# Patient Record
Sex: MALE | Race: WHITE | Employment: FULL TIME | URBAN - METROPOLITAN AREA
[De-identification: names, ages, dates, MRNs, and addresses within clinical notes are randomized per-mention and may not be internally consistent; named-entity substitution may affect disease eponyms.]

---

## 2017-01-23 ENCOUNTER — OFFICE VISIT (OUTPATIENT)
Dept: INTERNAL MEDICINE CLINIC | Facility: CLINIC | Age: 67
End: 2017-01-23

## 2017-01-23 VITALS
HEIGHT: 70 IN | RESPIRATION RATE: 20 BRPM | HEART RATE: 98 BPM | SYSTOLIC BLOOD PRESSURE: 150 MMHG | WEIGHT: 181.19 LBS | DIASTOLIC BLOOD PRESSURE: 90 MMHG | TEMPERATURE: 98 F | BODY MASS INDEX: 25.94 KG/M2

## 2017-01-23 DIAGNOSIS — J34.89 NASAL DISCHARGE: ICD-10-CM

## 2017-01-23 DIAGNOSIS — R06.00 DOE (DYSPNEA ON EXERTION): ICD-10-CM

## 2017-01-23 DIAGNOSIS — R03.0 ELEVATED BLOOD PRESSURE READING: ICD-10-CM

## 2017-01-23 DIAGNOSIS — Z12.5 SCREENING FOR PROSTATE CANCER: ICD-10-CM

## 2017-01-23 DIAGNOSIS — E78.5 HYPERLIPIDEMIA, UNSPECIFIED HYPERLIPIDEMIA TYPE: ICD-10-CM

## 2017-01-23 DIAGNOSIS — L40.9 PSORIASIS AND SIMILAR DISORDER: ICD-10-CM

## 2017-01-23 DIAGNOSIS — Z00.00 ENCOUNTER FOR ANNUAL HEALTH EXAMINATION: Primary | ICD-10-CM

## 2017-01-23 PROCEDURE — G0438 PPPS, INITIAL VISIT: HCPCS | Performed by: INTERNAL MEDICINE

## 2017-01-23 RX ORDER — LATANOPROST 50 UG/ML
SOLUTION/ DROPS OPHTHALMIC
Refills: 1 | COMMUNITY
Start: 2016-11-22

## 2017-01-23 NOTE — PROGRESS NOTES
HPI:   Matt Lucas is a 78year old male who presents for a Medicare Initial Annual Wellness visit (Once after 12 month Medicare anniversery) . Patient here for annual wellness visit and follow-up. Last seen here in September 2014.   Blood work at [facility] IN[facility]ILL 1 DROP INTO BOTH EYES AT BEDTIME AS DIRECTED      MEDICAL INFORMATION:   He  has a past medical history of Psoriasis; Hyperlipidemia (2005); and Plantar fasciitis (2012).     He  has past surgical history that includes colonoscopy (1999) an trouble hearing conversations in a noisy background such as a crowded room   or restaurant:  No   I get confused about where sounds come from:  No I misunderstand some   words in a sentence and need to ask people to repeat themselves:  No   I especially four quadrants,  no masses, no organomegaly   Genitalia: Normal male   Rectal: Normal tone, normal prostate, no masses or tenderness   Extremities: Extremities normal, atraumatic, no cyanosis or edema   Pulses: 2+ and symmetric   Skin: Psoriatic plaques reading  Plan: No history of hypertension. Work on diet, exercise, weight loss. Follow-up here in the office in 3 months. Mr. Miladis Martin does not currently take aspirin. We discussed the risks and benefits of aspirin therapy.    Jamal Mcarthur is unabl bathing?: No    Problems with daily activities? : No    Memory Problems?: No      Fall/Risk Assessment     Do you have 3 or more medical conditions?: 0-No    Have you fallen in the last 12 months?: 0-No    Do you accidently lose urine?: 0-No    Do you have Screening      Colonoscopy Screen every 10 years Colonoscopy,10 Years due on 08/20/2022 Update Health Maintenance if applicable    Flex Sigmoidoscopy Screen every 10 years No results found for this or any previous visit. No flowsheet data found.      Fecal

## 2017-01-23 NOTE — PATIENT INSTRUCTIONS
Edis Olsen's SCREENING SCHEDULE   Tests on this list are recommended by your physician but may not be covered, or covered at this frequency, by your insurer. Please check with your insurance carrier before scheduling to verify coverage.     ILIA more often if abnormal Colonoscopy,10 Years due on 08/20/2022 Update Health Maintenance if applicable    Flex Sigmoidoscopy Screen  Covered every 5 years No results found for this or any previous visit. No flowsheet data found.      Fecal Occult Blood   Cov unless Medically needed    Zoster (Not covered by Medicare Part B) No orders found for this or any previous visit. This may be covered with your pharmacy  prescription benefits     Recommended Websites for Advanced Directives    [email or web address]

## 2017-01-24 ENCOUNTER — HOSPITAL ENCOUNTER (OUTPATIENT)
Dept: GENERAL RADIOLOGY | Age: 67
Discharge: HOME | End: 2017-01-24
Attending: INTERNAL MEDICINE
Payer: MEDICARE

## 2017-01-24 ENCOUNTER — LAB ENCOUNTER (OUTPATIENT)
Dept: LAB | Age: 67
End: 2017-01-24
Attending: INTERNAL MEDICINE
Payer: MEDICARE

## 2017-01-24 DIAGNOSIS — Z12.5 SCREENING FOR PROSTATE CANCER: ICD-10-CM

## 2017-01-24 DIAGNOSIS — R06.00 DOE (DYSPNEA ON EXERTION): Primary | ICD-10-CM

## 2017-01-24 DIAGNOSIS — R06.00 DOE (DYSPNEA ON EXERTION): ICD-10-CM

## 2017-01-24 DIAGNOSIS — E78.5 HYPERLIPIDEMIA, UNSPECIFIED HYPERLIPIDEMIA TYPE: ICD-10-CM

## 2017-01-24 LAB
ALBUMIN SERPL BCP-MCNC: 4.2 G/DL (ref 3.5–4.8)
ALBUMIN/GLOB SERPL: 1.2 {RATIO} (ref 1–2)
ALP SERPL-CCNC: 55 U/L (ref 32–100)
ALT SERPL-CCNC: 30 U/L (ref 17–63)
ANION GAP SERPL CALC-SCNC: 7 MMOL/L (ref 0–18)
AST SERPL-CCNC: 31 U/L (ref 15–41)
BASOPHILS # BLD: 0 K/UL (ref 0–0.2)
BASOPHILS NFR BLD: 0 %
BILIRUB SERPL-MCNC: 1.1 MG/DL (ref 0.3–1.2)
BUN SERPL-MCNC: 15 MG/DL (ref 8–20)
BUN/CREAT SERPL: 15.3 (ref 10–20)
CALCIUM SERPL-MCNC: 9.4 MG/DL (ref 8.5–10.5)
CHLORIDE SERPL-SCNC: 106 MMOL/L (ref 95–110)
CHOLEST SERPL-MCNC: 250 MG/DL (ref 110–200)
CO2 SERPL-SCNC: 27 MMOL/L (ref 22–32)
CREAT SERPL-MCNC: 0.98 MG/DL (ref 0.5–1.5)
EOSINOPHIL # BLD: 0.1 K/UL (ref 0–0.7)
EOSINOPHIL NFR BLD: 3 %
ERYTHROCYTE [DISTWIDTH] IN BLOOD BY AUTOMATED COUNT: 12.6 % (ref 11–15)
GLOBULIN PLAS-MCNC: 3.5 G/DL (ref 2.5–3.7)
GLUCOSE SERPL-MCNC: 86 MG/DL (ref 70–99)
HCT VFR BLD AUTO: 42.6 % (ref 41–52)
HDLC SERPL-MCNC: 62 MG/DL
HGB BLD-MCNC: 14.6 G/DL (ref 13.5–17.5)
LDLC SERPL CALC-MCNC: 163 MG/DL (ref 0–99)
LYMPHOCYTES # BLD: 1.8 K/UL (ref 1–4)
LYMPHOCYTES NFR BLD: 34 %
MCH RBC QN AUTO: 31.2 PG (ref 27–32)
MCHC RBC AUTO-ENTMCNC: 34.2 G/DL (ref 32–37)
MCV RBC AUTO: 91.3 FL (ref 80–100)
MONOCYTES # BLD: 0.4 K/UL (ref 0–1)
MONOCYTES NFR BLD: 9 %
NEUTROPHILS # BLD AUTO: 2.9 K/UL (ref 1.8–7.7)
NEUTROPHILS NFR BLD: 55 %
NONHDLC SERPL-MCNC: 188 MG/DL
OSMOLALITY UR CALC.SUM OF ELEC: 290 MOSM/KG (ref 275–295)
PLATELET # BLD AUTO: 221 K/UL (ref 140–400)
PMV BLD AUTO: 7.8 FL (ref 7.4–10.3)
POTASSIUM SERPL-SCNC: 4.3 MMOL/L (ref 3.3–5.1)
PROT SERPL-MCNC: 7.7 G/DL (ref 5.9–8.4)
PSA SERPL-MCNC: 0.8 NG/ML (ref 0–4)
RBC # BLD AUTO: 4.67 M/UL (ref 4.5–5.9)
SODIUM SERPL-SCNC: 140 MMOL/L (ref 136–144)
TRIGL SERPL-MCNC: 127 MG/DL (ref 1–149)
TSH SERPL-ACNC: 2.46 UIU/ML (ref 0.34–5.6)
VIT B12 SERPL-MCNC: 289 PG/ML (ref 181–914)
WBC # BLD AUTO: 5.2 K/UL (ref 4–11)

## 2017-01-24 PROCEDURE — 93010 ELECTROCARDIOGRAM REPORT: CPT | Performed by: INTERNAL MEDICINE

## 2017-01-24 PROCEDURE — 71020 XR CHEST PA + LAT CHEST (CPT=71020): CPT

## 2017-01-24 PROCEDURE — 93005 ELECTROCARDIOGRAM TRACING: CPT

## 2017-01-24 PROCEDURE — 36415 COLL VENOUS BLD VENIPUNCTURE: CPT

## 2017-01-24 PROCEDURE — 80061 LIPID PANEL: CPT

## 2017-01-24 PROCEDURE — 82607 VITAMIN B-12: CPT

## 2017-01-24 PROCEDURE — 84443 ASSAY THYROID STIM HORMONE: CPT

## 2017-01-24 PROCEDURE — 80053 COMPREHEN METABOLIC PANEL: CPT

## 2017-01-24 PROCEDURE — 85025 COMPLETE CBC W/AUTO DIFF WBC: CPT

## 2017-01-24 NOTE — PROGRESS NOTES
Quick Note:    Please refer to lab letter done today in regards to the response to these labs  ______

## 2017-09-14 ENCOUNTER — OFFICE VISIT (OUTPATIENT)
Dept: INTERNAL MEDICINE CLINIC | Facility: CLINIC | Age: 67
End: 2017-09-14

## 2017-09-14 VITALS
TEMPERATURE: 98 F | BODY MASS INDEX: 25.08 KG/M2 | RESPIRATION RATE: 20 BRPM | SYSTOLIC BLOOD PRESSURE: 110 MMHG | HEART RATE: 86 BPM | DIASTOLIC BLOOD PRESSURE: 72 MMHG | HEIGHT: 70 IN | WEIGHT: 175.19 LBS

## 2017-09-14 DIAGNOSIS — E78.5 HYPERLIPIDEMIA, UNSPECIFIED HYPERLIPIDEMIA TYPE: Primary | ICD-10-CM

## 2017-09-14 PROCEDURE — G0463 HOSPITAL OUTPT CLINIC VISIT: HCPCS | Performed by: INTERNAL MEDICINE

## 2017-09-14 PROCEDURE — 99213 OFFICE O/P EST LOW 20 MIN: CPT | Performed by: INTERNAL MEDICINE

## 2017-09-14 NOTE — PROGRESS NOTES
HPI:    Patient ID: Channing Okeefe is a 75year old male.     HPI  Patient returns to the office today to discuss chronic medical issues which include Patient Active Problem List:     Plantar fasciitis     Psoriasis and similar disorder     Hyperlipidemia of Systems   Constitutional: Negative for chills, fatigue and fever. HENT: Negative for hearing loss. Eyes: Negative for visual disturbance. Respiratory: Positive for cough. Negative for shortness of breath.     Cardiovascular: Negative for chest stacey next 10 years for cardiovascular event. All this was discussed with the patient. Discussed current indications for initiation of statin therapy. Patient is not eager to start any medication. We will recheck now and contact patient with results.   Contin

## 2017-09-28 ENCOUNTER — APPOINTMENT (OUTPATIENT)
Dept: LAB | Age: 67
End: 2017-09-28
Attending: INTERNAL MEDICINE
Payer: MEDICARE

## 2017-09-28 DIAGNOSIS — E78.5 HYPERLIPIDEMIA, UNSPECIFIED HYPERLIPIDEMIA TYPE: ICD-10-CM

## 2017-09-28 PROCEDURE — 80061 LIPID PANEL: CPT

## 2017-09-28 PROCEDURE — 36415 COLL VENOUS BLD VENIPUNCTURE: CPT

## 2017-10-03 ENCOUNTER — OFFICE VISIT (OUTPATIENT)
Dept: PODIATRY CLINIC | Facility: CLINIC | Age: 67
End: 2017-10-03

## 2017-10-03 DIAGNOSIS — M77.50 TENDONITIS OF FOOT: Primary | ICD-10-CM

## 2017-10-03 PROCEDURE — 99203 OFFICE O/P NEW LOW 30 MIN: CPT | Performed by: PODIATRIST

## 2017-10-03 PROCEDURE — G0463 HOSPITAL OUTPT CLINIC VISIT: HCPCS | Performed by: PODIATRIST

## 2017-10-03 NOTE — PROGRESS NOTES
HPI:    Patient ID: Holland Suarez is a 75year old male. HPI  This 68-year-old male presents as a new patient to me and states that he is self-referred. The reason for pain is pain associated with the right foot over the last couple of months.   He ha of foot  (primary encounter diagnosis)    No orders of the defined types were placed in this encounter.       Meds This Visit:  No prescriptions requested or ordered in this encounter    Imaging & Referrals:  None       TR#[id number]

## 2018-02-12 ENCOUNTER — OFFICE VISIT (OUTPATIENT)
Dept: DERMATOLOGY CLINIC | Facility: CLINIC | Age: 68
End: 2018-02-12

## 2018-02-12 DIAGNOSIS — D23.9 BENIGN NEOPLASM OF SKIN, UNSPECIFIED LOCATION: ICD-10-CM

## 2018-02-12 DIAGNOSIS — L82.1 VERRUCOUS KERATOSIS: ICD-10-CM

## 2018-02-12 DIAGNOSIS — L82.1 SEBORRHEIC KERATOSES: ICD-10-CM

## 2018-02-12 DIAGNOSIS — L40.9 PSORIASIS: ICD-10-CM

## 2018-02-12 DIAGNOSIS — L57.0 AK (ACTINIC KERATOSIS): Primary | ICD-10-CM

## 2018-02-12 PROCEDURE — 17000 DESTRUCT PREMALG LESION: CPT | Performed by: DERMATOLOGY

## 2018-02-12 PROCEDURE — 99202 OFFICE O/P NEW SF 15 MIN: CPT | Performed by: DERMATOLOGY

## 2018-02-12 PROCEDURE — 17110 DESTRUCTION B9 LES UP TO 14: CPT | Performed by: DERMATOLOGY

## 2018-02-12 RX ORDER — LATANOPROST 50 UG/ML
SOLUTION/ DROPS OPHTHALMIC
COMMUNITY
Start: 2017-11-07 | End: 2018-02-12

## 2018-02-25 NOTE — PROGRESS NOTES
Rony Manuel is a 75year old male. HPI:     CC:  Patient presents with:  Full Skin Exam: LOV 5/20/2013 with Dr. Mik Coello. Pt presenting for full body skin exam. Pt has a personal hx of BCC.   Lesion: Pt presenting with lesions to R eyebrow and L middle No    Reaction to local anesthetic No     Social History Narrative   None on file     Family History   Problem Relation Age of Onset   • Kidney Disease Father      kidney failure (cause of death)   • Other [OTHER] Mother      natural causes (cause of death plan:    No orders of the defined types were placed in this encounter.       Meds & Refills for this Visit:  No prescriptions requested or ordered in this encounter         Ak (actinic keratosis)  (primary encounter diagnosis)  Psoriasis  Seborrheic keratos

## 2020-07-01 ENCOUNTER — TELEPHONE (OUTPATIENT)
Dept: DERMATOLOGY CLINIC | Facility: CLINIC | Age: 70
End: 2020-07-01

## 2020-07-01 ENCOUNTER — OFFICE VISIT (OUTPATIENT)
Dept: DERMATOLOGY CLINIC | Facility: CLINIC | Age: 70
End: 2020-07-01
Payer: MEDICARE

## 2020-07-01 DIAGNOSIS — D23.4 BENIGN NEOPLASM OF SCALP AND SKIN OF NECK: ICD-10-CM

## 2020-07-01 DIAGNOSIS — L40.0 PSORIASIS VULGARIS: ICD-10-CM

## 2020-07-01 DIAGNOSIS — D23.60 BENIGN NEOPLASM OF SKIN OF UPPER LIMB, INCLUDING SHOULDER, UNSPECIFIED LATERALITY: ICD-10-CM

## 2020-07-01 DIAGNOSIS — D23.30 BENIGN NEOPLASM OF SKIN OF FACE: ICD-10-CM

## 2020-07-01 DIAGNOSIS — D23.70 BENIGN NEOPLASM OF SKIN OF LOWER LIMB, INCLUDING HIP, UNSPECIFIED LATERALITY: ICD-10-CM

## 2020-07-01 DIAGNOSIS — D23.5 BENIGN NEOPLASM OF SKIN OF TRUNK, EXCEPT SCROTUM: ICD-10-CM

## 2020-07-01 DIAGNOSIS — Z85.828 HISTORY OF NONMELANOMA SKIN CANCER: ICD-10-CM

## 2020-07-01 DIAGNOSIS — L57.0 AK (ACTINIC KERATOSIS): Primary | ICD-10-CM

## 2020-07-01 PROCEDURE — 99214 OFFICE O/P EST MOD 30 MIN: CPT | Performed by: DERMATOLOGY

## 2020-07-01 PROCEDURE — G0463 HOSPITAL OUTPT CLINIC VISIT: HCPCS | Performed by: DERMATOLOGY

## 2020-07-01 RX ORDER — CLOBETASOL PROPIONATE 0.5 MG/G
OINTMENT TOPICAL
Qty: 60 G | Refills: 1 | Status: SHIPPED | OUTPATIENT
Start: 2020-07-01 | End: 2021-04-09

## 2020-07-01 RX ORDER — BETAMETHASONE DIPROPIONATE 0.5 MG/G
1 CREAM TOPICAL 2 TIMES DAILY
Qty: 50 G | Refills: 3 | Status: SHIPPED | OUTPATIENT
Start: 2020-07-01 | End: 2021-08-17

## 2020-07-01 NOTE — TELEPHONE ENCOUNTER
Pt seen today by Anne Marie Raza, received electronic PA for his duobrii, PA completed, awaiting decision

## 2020-07-02 NOTE — TELEPHONE ENCOUNTER
Fax from [place] [phone] denied for Halobetasol Prop-Tazarotene (DUOBRII) 0.01-0.045 % External Lotion    Placed in pa inbox

## 2020-07-02 NOTE — TELEPHONE ENCOUNTER
PA denial reason: We denied this request under Medicare Part D because the drug you asked for is non-formulary. Denial letter placed in KMT's inbox for review.

## 2020-07-03 NOTE — TELEPHONE ENCOUNTER
Emilee orellana. If pt wants, can try at [facility] [place], but he is Medicare, so additional rebates may not be possible.  He has clobetasol oint for now, ok to try that alone 1st.      Could see if tazorac is an option to add for the thicker spots along with the clobet

## 2020-07-06 NOTE — PROGRESS NOTES
Jayant Paul is a 69year old male. HPI:     CC:  Patient presents with:  Derm Problem: LOV 2/12/18. Patient presents for full body check. Moles on back patient would like assessed. Unknown if changing.  Personal hx of BCC  Psoriasis: Psoriasis patches thumb 4 sutures     Social History    Socioeconomic History      Marital status:       Spouse name: Not on file      Number of children: Not on file      Years of education: Not on file      Highest education level: Not on file    Occupational Histo tanning: Not Asked        Outdoor occupation: Not Asked        Pt has a pacemaker: No        Pt has a defibrillator: No        Reaction to local anesthetic: No    Social History Narrative      Not on file    Family History   Problem Relation Age of Onset lips external ears, back, chest,/ breasts, axillae,  abdomen, arms, legs, palms.      Multiple light to medium brown, well marginated, uniformly pigmented, macules and papules 6 mm and less scattered on exam. pigmented lesions examined with dermoscopy benig previously treated with cryo stable. Multiple benign keratoses of the shoulders back. Reassurance.       Psoriasis diffuse over the scalp with prominent erythema scaling keratotic papules, prominent plaques elbows knees some with more pustular areas and follow-up/ above. This note was generated using Dragon voice recognition software. Please contact me regarding any confusion resulting from errors in recognition.

## 2020-07-06 NOTE — TELEPHONE ENCOUNTER
Pt informed and states he will try the clobetasol ointment for now and will let us know if he needs us to pursue an additional topical  in the near future.

## 2020-08-26 ENCOUNTER — OFFICE VISIT (OUTPATIENT)
Dept: INTERNAL MEDICINE CLINIC | Facility: CLINIC | Age: 70
End: 2020-08-26
Payer: MEDICARE

## 2020-08-26 VITALS
HEIGHT: 70 IN | WEIGHT: 167.81 LBS | HEART RATE: 66 BPM | DIASTOLIC BLOOD PRESSURE: 76 MMHG | BODY MASS INDEX: 24.02 KG/M2 | SYSTOLIC BLOOD PRESSURE: 131 MMHG

## 2020-08-26 DIAGNOSIS — Z12.5 SCREENING FOR PROSTATE CANCER: ICD-10-CM

## 2020-08-26 DIAGNOSIS — Z00.00 ENCOUNTER FOR ANNUAL HEALTH EXAMINATION: Primary | ICD-10-CM

## 2020-08-26 DIAGNOSIS — E78.5 HYPERLIPIDEMIA, UNSPECIFIED HYPERLIPIDEMIA TYPE: ICD-10-CM

## 2020-08-26 PROCEDURE — G0439 PPPS, SUBSEQ VISIT: HCPCS | Performed by: INTERNAL MEDICINE

## 2020-08-26 NOTE — PATIENT INSTRUCTIONS
Edis Olsen's SCREENING SCHEDULE   Tests on this list are recommended by your physician but may not be covered, or covered at this frequency, by your insurer. Please check with your insurance carrier before scheduling to verify coverage.     ILIA Colorectal Cancer Screening Covered up to Age 76     Colonoscopy Screen   Covered every 10 years- more often if abnormal Colonoscopy due on 08/20/2022 Update Health Maintenance if applicable    Flex Sigmoidoscopy Screen  Covered every 5 years No results visit. This may be covered with your prescription benefits, but Medicare does not cover unless Medically needed    Zoster (Not covered by Medicare Part B) No orders found for this or any previous visit.  This may be covered with your pharmacy  prescription

## 2020-08-26 NOTE — PROGRESS NOTES
HPI:   Isela Carlson is a 69year old male who presents for a Medicare Subsequent Annual Wellness visit (Pt already had Initial Annual Wellness).     Patient is here for Medicare annual wellness visit and follow-up on chronic medical issues as listed be office a copy of it for scanning into Epic. He smoked tobacco in the past but quit greater than 12 months ago.   Social History    Tobacco Use      Smoking status: Former Smoker      Smokeless tobacco: Never Used         CAGE Alcohol screening   T history (2013). His family history includes Kidney Disease in his father; Other in his mother. SOCIAL HISTORY:   He  reports that he has quit smoking. He has never used smokeless tobacco. He reports current alcohol use.  He reports that he does not use a crowded room or restaurant:  No   I get confused about where sounds come from:  No I misunderstand some words in a sentence and need to ask people to repeat themselves:  No   I especially have trouble understanding the speech of women and children:  No I no cervical adenopathy. Right: No inguinal adenopathy present. Left: No inguinal adenopathy present. Neurological: He is alert. No cranial nerve deficit. Coordination normal.   Skin: Skin is warm and dry. No rash noted.    Psychiatric: He ha Biju Sims MD, 8/26/2020     General Health     In the past six months, have you lost more than 10 pounds without trying?: 2 - No  Has your appetite been poor?: No  How does the patient maintain a good energy level?: Other  How would you describe your daily (Pneumovax)  Covered Once after 65 No vaccine history found Please get once after your 65th birthday    Hepatitis B for Moderate/High Risk No vaccine history found Medium/high risk factors:   End-stage renal disease   Hemophiliacs who received Factor VIII

## 2020-08-28 ENCOUNTER — LAB ENCOUNTER (OUTPATIENT)
Dept: LAB | Age: 70
End: 2020-08-28
Attending: INTERNAL MEDICINE
Payer: MEDICARE

## 2020-08-28 DIAGNOSIS — Z12.5 SCREENING FOR PROSTATE CANCER: ICD-10-CM

## 2020-08-28 DIAGNOSIS — E78.5 HYPERLIPIDEMIA, UNSPECIFIED HYPERLIPIDEMIA TYPE: ICD-10-CM

## 2020-08-28 LAB
ALBUMIN SERPL-MCNC: 3.8 G/DL (ref 3.4–5)
ALBUMIN/GLOB SERPL: 1 {RATIO} (ref 1–2)
ALP LIVER SERPL-CCNC: 74 U/L (ref 45–117)
ALT SERPL-CCNC: 30 U/L (ref 16–61)
ANION GAP SERPL CALC-SCNC: 6 MMOL/L (ref 0–18)
AST SERPL-CCNC: 23 U/L (ref 15–37)
BASOPHILS # BLD AUTO: 0.02 X10(3) UL (ref 0–0.2)
BASOPHILS NFR BLD AUTO: 0.4 %
BILIRUB SERPL-MCNC: 0.7 MG/DL (ref 0.1–2)
BUN BLD-MCNC: 19 MG/DL (ref 7–18)
BUN/CREAT SERPL: 18.1 (ref 10–20)
CALCIUM BLD-MCNC: 9.5 MG/DL (ref 8.5–10.1)
CHLORIDE SERPL-SCNC: 108 MMOL/L (ref 98–112)
CHOLEST SMN-MCNC: 236 MG/DL (ref ?–200)
CO2 SERPL-SCNC: 26 MMOL/L (ref 21–32)
COMPLEXED PSA SERPL-MCNC: 1.12 NG/ML (ref ?–4)
CREAT BLD-MCNC: 1.05 MG/DL (ref 0.7–1.3)
DEPRECATED RDW RBC AUTO: 39.9 FL (ref 35.1–46.3)
EOSINOPHIL # BLD AUTO: 0.08 X10(3) UL (ref 0–0.7)
EOSINOPHIL NFR BLD AUTO: 1.6 %
ERYTHROCYTE [DISTWIDTH] IN BLOOD BY AUTOMATED COUNT: 11.9 % (ref 11–15)
GLOBULIN PLAS-MCNC: 4 G/DL (ref 2.8–4.4)
GLUCOSE BLD-MCNC: 82 MG/DL (ref 70–99)
HCT VFR BLD AUTO: 41.4 % (ref 39–53)
HDLC SERPL-MCNC: 60 MG/DL (ref 40–59)
HGB BLD-MCNC: 14.3 G/DL (ref 13–17.5)
IMM GRANULOCYTES # BLD AUTO: 0.02 X10(3) UL (ref 0–1)
IMM GRANULOCYTES NFR BLD: 0.4 %
LDLC SERPL CALC-MCNC: 147 MG/DL (ref ?–100)
LYMPHOCYTES # BLD AUTO: 1.51 X10(3) UL (ref 1–4)
LYMPHOCYTES NFR BLD AUTO: 30.3 %
M PROTEIN MFR SERPL ELPH: 7.8 G/DL (ref 6.4–8.2)
MCH RBC QN AUTO: 31.6 PG (ref 26–34)
MCHC RBC AUTO-ENTMCNC: 34.5 G/DL (ref 31–37)
MCV RBC AUTO: 91.6 FL (ref 80–100)
MONOCYTES # BLD AUTO: 0.48 X10(3) UL (ref 0.1–1)
MONOCYTES NFR BLD AUTO: 9.6 %
NEUTROPHILS # BLD AUTO: 2.87 X10 (3) UL (ref 1.5–7.7)
NEUTROPHILS # BLD AUTO: 2.87 X10(3) UL (ref 1.5–7.7)
NEUTROPHILS NFR BLD AUTO: 57.7 %
NONHDLC SERPL-MCNC: 176 MG/DL (ref ?–130)
OSMOLALITY SERPL CALC.SUM OF ELEC: 291 MOSM/KG (ref 275–295)
PATIENT FASTING Y/N/NP: YES
PATIENT FASTING Y/N/NP: YES
PLATELET # BLD AUTO: 210 10(3)UL (ref 150–450)
POTASSIUM SERPL-SCNC: 4.3 MMOL/L (ref 3.5–5.1)
RBC # BLD AUTO: 4.52 X10(6)UL (ref 3.8–5.8)
SODIUM SERPL-SCNC: 140 MMOL/L (ref 136–145)
TRIGL SERPL-MCNC: 144 MG/DL (ref 30–149)
TSI SER-ACNC: 2.46 MIU/ML (ref 0.36–3.74)
VLDLC SERPL CALC-MCNC: 29 MG/DL (ref 0–30)
WBC # BLD AUTO: 5 X10(3) UL (ref 4–11)

## 2020-08-28 PROCEDURE — 80053 COMPREHEN METABOLIC PANEL: CPT

## 2020-08-28 PROCEDURE — 36415 COLL VENOUS BLD VENIPUNCTURE: CPT

## 2020-08-28 PROCEDURE — 80061 LIPID PANEL: CPT

## 2020-08-28 PROCEDURE — 85025 COMPLETE CBC W/AUTO DIFF WBC: CPT

## 2020-08-28 PROCEDURE — 84443 ASSAY THYROID STIM HORMONE: CPT

## 2021-03-15 DIAGNOSIS — Z23 NEED FOR VACCINATION: ICD-10-CM

## 2021-04-09 RX ORDER — CLOBETASOL PROPIONATE 0.5 MG/G
OINTMENT TOPICAL
Qty: 60 G | Refills: 0 | Status: SHIPPED | OUTPATIENT
Start: 2021-04-09

## 2021-04-16 ENCOUNTER — OFFICE VISIT (OUTPATIENT)
Dept: INTERNAL MEDICINE CLINIC | Facility: CLINIC | Age: 71
End: 2021-04-16
Payer: MEDICARE

## 2021-04-16 ENCOUNTER — HOSPITAL ENCOUNTER (OUTPATIENT)
Dept: GENERAL RADIOLOGY | Facility: HOSPITAL | Age: 71
Discharge: HOME | End: 2021-04-16
Attending: INTERNAL MEDICINE
Payer: MEDICARE

## 2021-04-16 VITALS
WEIGHT: 180.19 LBS | RESPIRATION RATE: 20 BRPM | DIASTOLIC BLOOD PRESSURE: 80 MMHG | SYSTOLIC BLOOD PRESSURE: 146 MMHG | HEIGHT: 70 IN | BODY MASS INDEX: 25.8 KG/M2 | HEART RATE: 58 BPM

## 2021-04-16 DIAGNOSIS — M54.50 MIDLINE LOW BACK PAIN WITHOUT SCIATICA, UNSPECIFIED CHRONICITY: Primary | ICD-10-CM

## 2021-04-16 DIAGNOSIS — M54.50 MIDLINE LOW BACK PAIN WITHOUT SCIATICA, UNSPECIFIED CHRONICITY: ICD-10-CM

## 2021-04-16 PROCEDURE — 99213 OFFICE O/P EST LOW 20 MIN: CPT | Performed by: INTERNAL MEDICINE

## 2021-04-16 PROCEDURE — 72100 X-RAY EXAM L-S SPINE 2/3 VWS: CPT | Performed by: INTERNAL MEDICINE

## 2021-04-16 RX ORDER — ASPIRIN 325 MG
325 TABLET ORAL EVERY 6 HOURS PRN
COMMUNITY
End: 2021-09-27

## 2021-04-16 NOTE — PROGRESS NOTES
HPI:    Patient ID: Silvana Chavez is a 66year old male. HPI  Patient is here complaining of low back pain for the past couple of months. 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   It is seasonal so he does not wor Ointment APPLY TWICE DAILY TOPICALLY TO PSORIASIS ON BUTTOCKS, ELBOWS KNEES 60 g 0   • Halobetasol Prop-Tazarotene (DUOBRII) 0.01-0.045 % External Lotion Apply 1 Application topically daily.  100 g 3   • Betamethasone Dipropionate Aug 0.05 % External Cream where he is not doing that sort of strenuous activity. Continue with the stretching and core exercises at the chiropractor taught him. Refer to physiatry for further evaluation and treatment options. - XR LUMBAR SPINE (MIN 2 VIEWS) (CPT=72100);  Future

## 2021-06-23 ENCOUNTER — TELEPHONE (OUTPATIENT)
Dept: ORTHOPEDICS CLINIC | Facility: CLINIC | Age: 71
End: 2021-06-23

## 2021-06-23 DIAGNOSIS — M79.672 LEFT FOOT PAIN: Primary | ICD-10-CM

## 2021-06-23 NOTE — TELEPHONE ENCOUNTER
Patient coming in for left foot pain, states that pain worsens while walking and standing. Patient has not had imaging, if needed please place RX.   Future Appointments   Date Time Provider Rosanna Dumont   6/24/2021  3:00 PM Shadi Brown, [facility]

## 2021-06-23 NOTE — TELEPHONE ENCOUNTER
Called and lmom for Jane Obrien about his appointment tomorrow just letting him know that an order for left foot xray's have been placed.  I also suggested that he show up to the appointment about 20/30 mins prior to his appointment time to have xray's taken of

## 2021-06-24 ENCOUNTER — TELEPHONE (OUTPATIENT)
Dept: ORTHOPEDICS CLINIC | Facility: CLINIC | Age: 71
End: 2021-06-24

## 2021-06-24 ENCOUNTER — OFFICE VISIT (OUTPATIENT)
Dept: ORTHOPEDICS CLINIC | Facility: CLINIC | Age: 71
End: 2021-06-24
Payer: MEDICARE

## 2021-06-24 ENCOUNTER — HOSPITAL ENCOUNTER (OUTPATIENT)
Dept: GENERAL RADIOLOGY | Age: 71
Discharge: HOME | End: 2021-06-24
Attending: PODIATRIST
Payer: MEDICARE

## 2021-06-24 VITALS — BODY MASS INDEX: 24.5 KG/M2 | HEIGHT: 71 IN | WEIGHT: 175 LBS

## 2021-06-24 DIAGNOSIS — R26.9 GAIT ABNORMALITY: ICD-10-CM

## 2021-06-24 DIAGNOSIS — M79.672 LEFT FOOT PAIN: ICD-10-CM

## 2021-06-24 DIAGNOSIS — M72.2 PLANTAR FASCIITIS: Primary | ICD-10-CM

## 2021-06-24 PROCEDURE — 99202 OFFICE O/P NEW SF 15 MIN: CPT | Performed by: PODIATRIST

## 2021-06-24 PROCEDURE — 73630 X-RAY EXAM OF FOOT: CPT | Performed by: PODIATRIST

## 2021-06-24 NOTE — PROGRESS NOTES
[facility] New Patient Note    CC: Patient presents with: Foot Pain: left foot pain/xray today      HPI: The patient is a 66year old male who presents today with complaints of left outside of foot pain for some time and hx of PF.   No injury, mentioned above. Physical Exam:    Ht 5' 11\" (1.803 m)   Wt 175 lb (79.4 kg)   BMI 24.41 kg/m²   Neurovascular status is intact distally. Patient is a neutral arch upon stance a little dropping of the arch on the left.   Tenderness very minimal along t

## 2021-06-24 NOTE — TELEPHONE ENCOUNTER
No prior auth or precert needed for orthotics- 20% COINS pt should have a 171.00 balance at the time of casting     Please schedule casting appt

## 2021-06-25 NOTE — TELEPHONE ENCOUNTER
LMOM letting patient know balance of orthotics due at the time of casting and asked for him to return call to schedule appointment.

## 2021-07-01 ENCOUNTER — MED REC SCAN ONLY (OUTPATIENT)
Dept: ORTHOPEDICS CLINIC | Facility: CLINIC | Age: 71
End: 2021-07-01

## 2021-07-15 ENCOUNTER — OFFICE VISIT (OUTPATIENT)
Dept: ORTHOPEDICS CLINIC | Facility: CLINIC | Age: 71
End: 2021-07-15
Payer: MEDICARE

## 2021-07-15 VITALS — WEIGHT: 175 LBS | BODY MASS INDEX: 24.5 KG/M2 | HEIGHT: 71 IN

## 2021-07-15 DIAGNOSIS — M72.2 PLANTAR FASCIITIS: Primary | ICD-10-CM

## 2021-07-15 PROCEDURE — L3000 FT INSERT UCB BERKELEY SHELL: HCPCS | Performed by: PODIATRIST

## 2021-07-15 NOTE — PROGRESS NOTES
[facility] Progress Note    Subjective: Mr. Celeste Mckenzie is here for orthotic casting today.   He has had continued heel pain and arch pain,  pretty much the same issues as per last visit      Objective:   atilio feet  Narrow foot type, neutral arch  Minimal

## 2021-07-30 ENCOUNTER — TELEPHONE (OUTPATIENT)
Dept: ORTHOPEDICS CLINIC | Facility: CLINIC | Age: 71
End: 2021-07-30

## 2021-07-30 NOTE — TELEPHONE ENCOUNTER
I called and spoke with Cristian's wife telling her that his orthotics are in. He will call to set up an appointment.

## 2021-08-13 ENCOUNTER — OFFICE VISIT (OUTPATIENT)
Dept: ORTHOPEDICS CLINIC | Facility: CLINIC | Age: 71
End: 2021-08-13
Payer: MEDICARE

## 2021-08-13 VITALS — WEIGHT: 175 LBS | BODY MASS INDEX: 24.5 KG/M2 | HEIGHT: 71 IN

## 2021-08-13 DIAGNOSIS — M72.2 PLANTAR FASCIITIS: Primary | ICD-10-CM

## 2021-08-13 PROCEDURE — 97760 ORTHOTIC MGMT&TRAING 1ST ENC: CPT | Performed by: PODIATRIST

## 2021-08-13 NOTE — PROGRESS NOTES
[facility] Progress Note    Subjective:     Pt here for  of orthotics for plantar fasciits  About the same            Objective:     Bill feet - tender at PF insertion but improving  Minimal exam today                  Assessment/Plan:   [place]

## 2021-08-17 RX ORDER — BETAMETHASONE DIPROPIONATE 0.5 MG/G
CREAM TOPICAL
Qty: 50 G | Refills: 0 | Status: SHIPPED | OUTPATIENT
Start: 2021-08-17

## 2021-09-27 ENCOUNTER — OFFICE VISIT (OUTPATIENT)
Dept: INTERNAL MEDICINE CLINIC | Facility: CLINIC | Age: 71
End: 2021-09-27
Payer: MEDICARE

## 2021-09-27 VITALS
HEIGHT: 71 IN | DIASTOLIC BLOOD PRESSURE: 86 MMHG | TEMPERATURE: 98 F | RESPIRATION RATE: 18 BRPM | SYSTOLIC BLOOD PRESSURE: 130 MMHG | HEART RATE: 64 BPM | WEIGHT: 170 LBS | BODY MASS INDEX: 23.8 KG/M2

## 2021-09-27 DIAGNOSIS — E78.5 HYPERLIPIDEMIA, UNSPECIFIED HYPERLIPIDEMIA TYPE: ICD-10-CM

## 2021-09-27 DIAGNOSIS — Z12.5 SCREENING FOR PROSTATE CANCER: ICD-10-CM

## 2021-09-27 DIAGNOSIS — M54.50 MIDLINE LOW BACK PAIN WITHOUT SCIATICA, UNSPECIFIED CHRONICITY: ICD-10-CM

## 2021-09-27 DIAGNOSIS — Z00.00 ENCOUNTER FOR ANNUAL HEALTH EXAMINATION: Primary | ICD-10-CM

## 2021-09-27 DIAGNOSIS — G47.9 SLEEP DISTURBANCE: ICD-10-CM

## 2021-09-27 PROCEDURE — G0439 PPPS, SUBSEQ VISIT: HCPCS | Performed by: INTERNAL MEDICINE

## 2021-09-27 NOTE — PATIENT INSTRUCTIONS
Edis Olsen's SCREENING SCHEDULE   Tests on this list are recommended by your physician but may not be covered, or covered at this frequency, by your insurer. Please check with your insurance carrier before scheduling to verify coverage.    Althea Blake Pneumococcal Each vaccine (Zbpsoll25 & Srtuiyhts11) covered once after 65 Prevnar 13: -    Hxrxlvoop98: -     Pneumococcal Vaccination(1 of 1 - PPSV23) Never done    Hepatitis B One screening covered for patients with certain risk factors   -  No recommend

## 2021-09-27 NOTE — PROGRESS NOTES
HPI:   Shilo Meyers is a 66year old male who presents for a Medicare Subsequent Annual Wellness visit (Pt already had Initial Annual Wellness).      Patient is here requesting Medicare annual wellness visit and follow-up on chronic medical problems as screening of functional status. Vision Problems? : Yes   He has problems with Memory based on screening of functional status.    Memory Problems?: Yes       Depression Screening (PHQ-2/PHQ-9): Over the LAST 2 WEEKS   Little interest or pleasure in doing t (most recent labs)   Lab Results   Component Value Date    WBC 5.0 08/28/2020    HGB 14.3 08/28/2020    .0 08/28/2020        ALLERGIES:   He has No Known Allergies.     CURRENT MEDICATIONS:   Betamethasone Dipropionate Aug 0.05 % External Cream, KAVIN as a crowded room or restaurant: Sometimes   I get confused about where sounds come from: No I misunderstand some words in a sentence and need to ask people to repeat themselves: No   I especially have trouble understanding the speech of women and children left inguinal area. Genitourinary:     Penis: Normal and circumcised. Testes: Normal.   Musculoskeletal:      Right lower leg: No edema. Left lower leg: No edema. Lymphadenopathy:      Cervical: No cervical adenopathy.    Skin:     General: S Future  - LIPID PANEL; Future  - TSH W REFLEX TO FREE T4; Future  - VITAMIN B12; Future    3. Midline low back pain without sciatica, unspecified chronicity  Doing better. Seeing a chiropractor.     4. Sleep disturbance  Patient with recent development of screening every 6 months if diagnosed with pre-diabetes Lab Results   Component Value Date    GLU 82 08/28/2020        Cardiovascular Disease Screening    Lipid Panel  Cholesterol  Lipoprotein (HDL)  Triglycerides Covered every 5 years for all Medicare lala Medicare Part B -  No recommendations at this time    Zoster Not covered by Medicare Part B; may be covered with your pharmacy  prescription benefits -  Zoster Vaccines(1 of 2) Never done

## 2021-09-28 ENCOUNTER — LAB ENCOUNTER (OUTPATIENT)
Dept: LAB | Age: 71
End: 2021-09-28
Attending: INTERNAL MEDICINE
Payer: MEDICARE

## 2021-09-28 DIAGNOSIS — Z12.5 SCREENING FOR PROSTATE CANCER: ICD-10-CM

## 2021-09-28 DIAGNOSIS — E78.5 HYPERLIPIDEMIA, UNSPECIFIED HYPERLIPIDEMIA TYPE: ICD-10-CM

## 2021-09-28 PROCEDURE — 85025 COMPLETE CBC W/AUTO DIFF WBC: CPT

## 2021-09-28 PROCEDURE — 80061 LIPID PANEL: CPT

## 2021-09-28 PROCEDURE — 82607 VITAMIN B-12: CPT

## 2021-09-28 PROCEDURE — 84443 ASSAY THYROID STIM HORMONE: CPT

## 2021-09-28 PROCEDURE — 36415 COLL VENOUS BLD VENIPUNCTURE: CPT

## 2021-09-28 PROCEDURE — 80053 COMPREHEN METABOLIC PANEL: CPT

## 2021-11-24 ENCOUNTER — OFFICE VISIT (OUTPATIENT)
Dept: DERMATOLOGY CLINIC | Facility: CLINIC | Age: 71
End: 2021-11-24
Payer: MEDICARE

## 2021-11-24 DIAGNOSIS — Z85.828 HISTORY OF NONMELANOMA SKIN CANCER: ICD-10-CM

## 2021-11-24 DIAGNOSIS — L57.0 AK (ACTINIC KERATOSIS): Primary | ICD-10-CM

## 2021-11-24 DIAGNOSIS — D23.9 BENIGN NEOPLASM OF SKIN, UNSPECIFIED LOCATION: ICD-10-CM

## 2021-11-24 DIAGNOSIS — L40.0 PSORIASIS VULGARIS: ICD-10-CM

## 2021-11-24 DIAGNOSIS — L57.8 SUN-DAMAGED SKIN: ICD-10-CM

## 2021-11-24 PROCEDURE — 99213 OFFICE O/P EST LOW 20 MIN: CPT | Performed by: DERMATOLOGY

## 2021-11-24 RX ORDER — FLUOROURACIL 50 MG/G
CREAM TOPICAL
Qty: 40 G | Refills: 1 | Status: SHIPPED | OUTPATIENT
Start: 2021-11-24

## 2021-11-26 ENCOUNTER — TELEPHONE (OUTPATIENT)
Dept: DERMATOLOGY CLINIC | Facility: CLINIC | Age: 71
End: 2021-11-26

## 2021-11-26 NOTE — TELEPHONE ENCOUNTER
Fax from [facility]    Pa started for fluorouracil 5 % External Cream    KEY: [id number]    Placed fax in PA inbox

## 2021-11-30 NOTE — TELEPHONE ENCOUNTER
Medication PA Requested:    fluorouracil 5% cream                                                      CoverMyMeds Used:yes  Key:see below  Quantity:40gm  Day Supply:30  Sig: apply 2x week for 6 weeks  DX Code:                 L57.0                    CPT

## 2021-11-30 NOTE — PROGRESS NOTES
Maryjane Daugherty is a 66year old male. HPI:     CC:  Patient presents with:  Upper Body Exam: Hx of BCC. LOV 7/1/20. Pt presents for upper body exam.  Has lesion of conern to back and abdomen, denies changes in color, shape.   Psoriasis: Pt still having Allergies    Past Medical History:   Diagnosis Date   • Hyperlipidemia 2005    life style changes   • Plantar fasciitis 2012    steroid injection x2   • Psoriasis      Past Surgical History:   Procedure Laterality Date   • COLONOSCOPY  1999   • OTHER [place] kidney failure (cause of death)   • Other (Other) Mother         natural causes (cause of death)       There were no vitals filed for this visit. HPI:    Patient presents with:  Upper Body Exam: Hx of BCC. LOV 7/1/20.   Pt presents for upper body exam. papules scattered, cherry-red vascular papules scattered. See map today's date for lesions noted . Otherwise remarkable for lesions as noted on map.   See details of examination  See Assessment /Plan for additional history and physical exam also: previously treated with cryo stable. Multiple benign keratoses of the shoulders back. Reassurance. Patient with history of skin cancer. No evidence of recurrence. No new skin cancer.     Psoriasis diffuse over the scalp with prominent erythema sc indicates understanding of these issues and agrees to the plan. The patient is asked to return as noted in follow-up/ above. This note was generated using Dragon voice recognition software.   Please contact me regarding any confusion resulting from erro

## 2021-12-02 ENCOUNTER — OFFICE VISIT (OUTPATIENT)
Dept: SLEEP CENTER | Age: 71
End: 2021-12-02
Attending: INTERNAL MEDICINE
Payer: MEDICARE

## 2021-12-02 DIAGNOSIS — G47.9 SLEEP DISTURBANCE: ICD-10-CM

## 2021-12-02 PROCEDURE — 95806 SLEEP STUDY UNATT&RESP EFFT: CPT

## 2021-12-03 NOTE — PROCEDURES
[facility]  Accredited by the [place] of [employer] (AASM)    PATIENT'S NAME: Nayan Belcher   ATTENDING PHYSICIAN: Galdino Jenkins MD   REFERRING PHYSICIAN: Galdino Jenkins MD   PATIENT ACCOUNT #: [de-identified] LOCATION: position. RECOMMENDATIONS:    1. The patient should avoid the supine position. If he can maintain a side-lying position, this will obviate the need for CPAP titration.   He can consider sewing tennis balls into the back of the night shirt, use of a lo

## 2021-12-08 NOTE — TELEPHONE ENCOUNTER
fax from [facility]    Additional information needed for FLUOROURACIL Cream    Placed fax in pa inbox

## 2021-12-09 NOTE — TELEPHONE ENCOUNTER
Fax from [facility]    Approved for [address]     From 1/1/21-3/9/22    Placed fax in pa inbox

## 2022-03-02 ENCOUNTER — OFFICE VISIT (OUTPATIENT)
Dept: DERMATOLOGY CLINIC | Facility: CLINIC | Age: 72
End: 2022-03-02
Payer: MEDICARE

## 2022-03-02 DIAGNOSIS — L57.8 SUN-DAMAGED SKIN: ICD-10-CM

## 2022-03-02 DIAGNOSIS — L57.0 AK (ACTINIC KERATOSIS): Primary | ICD-10-CM

## 2022-03-02 DIAGNOSIS — L40.0 PSORIASIS VULGARIS: ICD-10-CM

## 2022-03-02 DIAGNOSIS — D23.9 BENIGN NEOPLASM OF SKIN, UNSPECIFIED LOCATION: ICD-10-CM

## 2022-03-02 DIAGNOSIS — Z85.828 HISTORY OF NONMELANOMA SKIN CANCER: ICD-10-CM

## 2022-03-02 PROCEDURE — 99213 OFFICE O/P EST LOW 20 MIN: CPT | Performed by: DERMATOLOGY

## 2022-03-30 RX ORDER — ATORVASTATIN CALCIUM 10 MG/1
10 TABLET, FILM COATED ORAL NIGHTLY
Qty: 90 TABLET | Refills: 0 | Status: SHIPPED | OUTPATIENT
Start: 2022-03-30 | End: 2022-06-29

## 2022-03-30 NOTE — TELEPHONE ENCOUNTER
Please review. Protocol failed / No protocol.     Requested Prescriptions   Pending Prescriptions Disp Refills    ATORVASTATIN 10 MG Oral Tab [Pharmacy Med Name: ATORVASTATIN 10 MG TABLET] 90 tablet 1     Sig: TAKE 1 TABLET BY MOUTH EVERY DAY AT NIGHT        Cholesterol Medication Protocol Failed - 3/30/2022  3:31 PM        Failed - Last LDL < 130     Lab Results   Component Value Date     (H) 09/28/2021               Passed - ALT in past 12 months        Passed - LDL in past 12 months        Passed - Last ALT < 80       Lab Results   Component Value Date    ALT 33 09/28/2021             Passed - Appointment in past 12 or next 3 months                  Recent Outpatient Visits              4 weeks ago AK (actinic keratosis)    [facility] Santi Talavera MD    Office Visit    3 months ago Sleep disturbance    [address] [facility]    Office Visit    4 months ago AK (actinic keratosis)    [facility] Derm[facility] Santi Talavera MD    Office Visit    6 months ago Encounter for annual health examination    [facility], [address], Tian Alvarez MD    Office Visit    7 months ago Plantar fasciitis    [address] - Orthopedics Young Machado DPM    Office Visit

## 2022-03-31 NOTE — TELEPHONE ENCOUNTER
1st attempt - M[place]int message sent for patient to contact the office to schedule an appointment; see notes below.

## 2022-05-04 ENCOUNTER — OFFICE VISIT (OUTPATIENT)
Dept: INTERNAL MEDICINE CLINIC | Facility: CLINIC | Age: 72
End: 2022-05-04
Payer: MEDICARE

## 2022-05-04 ENCOUNTER — TELEPHONE (OUTPATIENT)
Dept: INTERNAL MEDICINE CLINIC | Facility: CLINIC | Age: 72
End: 2022-05-04

## 2022-05-04 VITALS
SYSTOLIC BLOOD PRESSURE: 138 MMHG | DIASTOLIC BLOOD PRESSURE: 82 MMHG | HEIGHT: 71 IN | HEART RATE: 80 BPM | BODY MASS INDEX: 25.06 KG/M2 | RESPIRATION RATE: 18 BRPM | TEMPERATURE: 99 F | WEIGHT: 179 LBS

## 2022-05-04 DIAGNOSIS — N52.9 ERECTILE DYSFUNCTION, UNSPECIFIED ERECTILE DYSFUNCTION TYPE: ICD-10-CM

## 2022-05-04 DIAGNOSIS — E78.5 HYPERLIPIDEMIA, UNSPECIFIED HYPERLIPIDEMIA TYPE: Primary | ICD-10-CM

## 2022-05-04 DIAGNOSIS — Z13.6 SCREENING FOR HEART DISEASE: ICD-10-CM

## 2022-05-04 PROCEDURE — 99214 OFFICE O/P EST MOD 30 MIN: CPT | Performed by: INTERNAL MEDICINE

## 2022-05-04 RX ORDER — SILDENAFIL 100 MG/1
100 TABLET, FILM COATED ORAL
Qty: 6 TABLET | Refills: 5 | Status: SHIPPED | OUTPATIENT
Start: 2022-05-04

## 2022-05-04 NOTE — TELEPHONE ENCOUNTER
Prior Authorization Form has been filed out and faxed to [employer]  It can take 1-5 business days for it to come back.

## 2022-05-05 ENCOUNTER — LAB ENCOUNTER (OUTPATIENT)
Dept: LAB | Age: 72
End: 2022-05-05
Attending: INTERNAL MEDICINE
Payer: MEDICARE

## 2022-05-05 DIAGNOSIS — E78.5 HYPERLIPIDEMIA, UNSPECIFIED HYPERLIPIDEMIA TYPE: ICD-10-CM

## 2022-05-05 LAB
ALT SERPL-CCNC: 55 U/L
AST SERPL-CCNC: 39 U/L (ref 15–37)
CHOLEST SERPL-MCNC: 164 MG/DL (ref ?–200)
FASTING PATIENT LIPID ANSWER: YES
HDLC SERPL-MCNC: 65 MG/DL (ref 40–59)
LDLC SERPL CALC-MCNC: 80 MG/DL (ref ?–100)
NONHDLC SERPL-MCNC: 99 MG/DL (ref ?–130)
TRIGL SERPL-MCNC: 109 MG/DL (ref 30–149)
VLDLC SERPL CALC-MCNC: 17 MG/DL (ref 0–30)

## 2022-05-05 PROCEDURE — 36415 COLL VENOUS BLD VENIPUNCTURE: CPT

## 2022-05-05 PROCEDURE — 84460 ALANINE AMINO (ALT) (SGPT): CPT

## 2022-05-05 PROCEDURE — 80061 LIPID PANEL: CPT

## 2022-05-05 PROCEDURE — 84450 TRANSFERASE (AST) (SGOT): CPT

## 2022-05-12 ENCOUNTER — PATIENT MESSAGE (OUTPATIENT)
Dept: INTERNAL MEDICINE CLINIC | Facility: CLINIC | Age: 72
End: 2022-05-12

## 2022-05-13 NOTE — TELEPHONE ENCOUNTER
Please call the patient and advise him that his insurance will not cover the generic Viagra since it is being used for erectile dysfunction. He will need to pay for himself.

## 2022-05-16 ENCOUNTER — PATIENT MESSAGE (OUTPATIENT)
Dept: INTERNAL MEDICINE CLINIC | Facility: CLINIC | Age: 72
End: 2022-05-16

## 2022-06-16 RX ORDER — CLOBETASOL PROPIONATE 0.5 MG/G
OINTMENT TOPICAL
Qty: 60 G | Refills: 0 | Status: SHIPPED | OUTPATIENT
Start: 2022-06-16

## 2022-06-29 RX ORDER — ATORVASTATIN CALCIUM 10 MG/1
TABLET, FILM COATED ORAL
Qty: 90 TABLET | Refills: 0 | Status: SHIPPED | OUTPATIENT
Start: 2022-06-29

## 2022-08-02 ENCOUNTER — HOSPITAL ENCOUNTER (OUTPATIENT)
Dept: CT IMAGING | Age: 72
Discharge: HOME | End: 2022-08-02
Attending: INTERNAL MEDICINE

## 2022-08-02 DIAGNOSIS — Z13.6 SCREENING FOR HEART DISEASE: ICD-10-CM

## 2022-08-02 DIAGNOSIS — E78.5 HYPERLIPIDEMIA, UNSPECIFIED HYPERLIPIDEMIA TYPE: ICD-10-CM

## 2022-08-02 NOTE — PROGRESS NOTES
Date of Service 8/2/2022    Shadi Lovell  Date of Birth 3/14/1950    Patient Age: 67year old    PCP: Job Welch MD  [address] [place] [id number]    Consult Type  Type Scan/Screening: Heart Scan  Preliminary Heart Scan Score: 23.7                Body Mass Index  There is no height or weight on file to calculate BMI. Lipid Profile  Cholesterol: 164, done on 5/5/2022. HDL Cholesterol: 65, done on 5/5/2022. LDL Cholesterol: 80, done on 5/5/2022. TriGlycerides 109, done on 5/5/2022. Nurse Review  Risk factor information and results reviewed with Nurse: Yes    Recommended Follow Up:  Consult your physician regarding[de-identified] Final Heart Scan Report; Discuss potential for Incidental Finding    Free PV Screening offered to patient. (Free Carotid and Abdominal Aortic US to be scheduled.)      Recommendations for Change:  Nutrition Changes: Low Saturated Fat;Low Fat Dairy; Increase Fiber     Cholesterol Modification (goal of therapy depends upon your risk):   No Change Needed   (Today's NON-FASTING Cholestech Values: Total cholesterol-146, HDL-54, LDL-76, Triglycerides-79, Glucose-162.)    Exercise: Enhance Current Program           Repeat Heart Scan: 3 Years if Calcium Score is > 0. 0; Discuss with your Physician          [facility] Recommended Resources:  Recommended Resources: Upcoming Classes, Medical Services and Health Library [email or web address]. Марина Freed RN        Please Contact the Nurse Heart Line with any Questions or Concerns [phone].

## 2022-08-05 ENCOUNTER — ORDER TRANSCRIPTION (OUTPATIENT)
Dept: ADMINISTRATIVE | Facility: HOSPITAL | Age: 72
End: 2022-08-05

## 2022-08-05 DIAGNOSIS — Z13.9 ENCOUNTER FOR SCREENING: Primary | ICD-10-CM

## 2022-09-02 ENCOUNTER — TELEPHONE (OUTPATIENT)
Dept: GASTROENTEROLOGY | Facility: CLINIC | Age: 72
End: 2022-09-02

## 2022-09-02 NOTE — TELEPHONE ENCOUNTER
Dr. Gilberto nAderson    Patient called to schedule 10 year recall colonoscopy. Please provide orders if appropriate. Thank you      Last Procedure, Date, MD:  Colonoscopy Dr. Gilberto Anderson 8/20/2012  Last Diagnosis:  Small internal hemorrhoids, otherwise no masses or polyps noted  Recalled for (mth/yrs): 10 years  Sedation used previously:  IV  Last Prep Used (if known):  N/a-not in Epic  Quality of prep (if known): n/a  Anticoagulants: no  Diabetic Meds: no  BP meds(Ace inhibitors/ARB's): no  Weight loss meds (phentermine/vyvanse): no  Iron supplement (RX/OTC): no  Height & Weight/BMI: 5'11\" 179 lbs BMI 24.98  Hx of Cardiac/CVA issues/(MI/Stroke): no  Devices Pacemaker/Defibrillator/Stents: no  Resp. Issues/Oxygen Use/LUIS/COPD: no  Issues w/Anesthesia: no    Symptoms (Y/N): no  Symptoms Details: no    Special comments/notes: n/a    Please advise on orders and prep, thank you.

## 2022-09-02 NOTE — TELEPHONE ENCOUNTER
Patient:   Newton Barba #:   [id number]                    Room: [facility]  Stacie. MD #:  [id number]     ADMITTED:   2012        DATE:    2012. PRE-PROCEDURE DIAGNOSIS:    Colon screening. POST-PROCEDURE DIAGNOSIS:    1. Internal hemorrhoids. 2.    No masses  or polyps noted. SURGEON:   DORI Cool M.D. OPERATION:   Colonoscopy. TECHNIQUE:    After informed consent was obtained and all   questions were  answered, the patient was sedated with divided doses of Versed 4   mg and  Fentanyl 75 mcg. The patient was positioned in the left lateral   decubitus  position. Digital rectal exam was performed and was normal.  The lubricated   tip of  the Olympus CF series colonoscope was inserted into the anus and   passed to  the cecum as noted by the ileocecal valve and appendiceal orifice  landmarks. Scope was then gradually withdrawn. Careful   examination of the  mucosa was performed both on insertion and removal.  Retroflexion   was  performed in the body of the rectum. No immediate complications. The  patient tolerated the procedure well. Findings outlined below. ASA class  was 2. Prep was adequate. FINDINGS:  1. Small internal hemorrhoids. 2. Otherwise no masses or polyps noted. RECOMMENDATIONS:  1. Symptomatic treatment of hemorrhoids. 2. Consideration for repeat colonoscopy within ten years. D:    201210:21 A  T:    2012 12:30 P  ATTENDING:  Leslee Maldonado. Johana Cool  E   DICTATING:    Leslee Maldonado. Johana Cool MD 1598  MD ID #:      [id number]  cc:   Chani Solis MD [address] [place] [address]  Johana Cool,  E         JOB NUMBER:         [id number]  [facility] MARGARITA [facility] #: [id number].[facility]  MR #: [id number]                  Patient: Hermine Apgar

## 2022-09-02 NOTE — TELEPHONE ENCOUNTER
Schedulers:  Please contact patient to schedule colonoscopy per below orders from Dr. Fredi Merritt.     Thank you

## 2022-09-28 RX ORDER — ATORVASTATIN CALCIUM 10 MG/1
10 TABLET, FILM COATED ORAL NIGHTLY
Qty: 90 TABLET | Refills: 1 | Status: SHIPPED | OUTPATIENT
Start: 2022-09-28

## 2022-09-28 NOTE — TELEPHONE ENCOUNTER
Refill passed per [employer] protocol.   Requested Prescriptions   Pending Prescriptions Disp Refills    ATORVASTATIN 10 MG Oral Tab [Pharmacy Med Name: ATORVASTATIN 10 MG TABLET] 90 tablet 0     Sig: TAKE 1 TABLET BY MOUTH EVERY DAY AT NIGHT        Cholesterol Medication Protocol Passed - 9/28/2022  4:00 PM        Passed - ALT in past 12 months        Passed - LDL in past 12 months        Passed - Last ALT < 80       Lab Results   Component Value Date    ALT 55 05/05/2022             Passed - Last LDL < 130     Lab Results   Component Value Date    LDL 80 05/05/2022               Passed - In person appointment or virtual visit in the past 12 mos or appointment in next 3 mos       Recent Outpatient Visits              4 months ago Hyperlipidemia, unspecified hyperlipidemia type    Matthew William [place] Sager, MD    Office Visit    7 months ago AK (actinic keratosis)    [facility] Kirsten Langley MD    Office Visit    10 months ago Sleep disturbance    [address] [facility]    Office Visit    10 months ago AK (actinic keratosis)    [facility] Kirsten Langley MD    Office Visit    1 year ago Encounter for annual health examination    Yaw William MD    Office Visit     Future Appointments         Provider Department Appt Notes    In 1 month [facility] RN RADIOLOGY 1; Chicho Ware                    Future Appointments         Provider Department Appt Notes    In 1 month [facility] RN RADIOLOGY 1; hCicho Ware           Recent Outpatient Visits              4 months ago Hyperlipidemia, unspecified hyperlipidemia type    Angelina Banegas MD    Office Visit    7 months ago AK (actinic keratosis)    [facility] Kirsten Langley MD    Office Visit    10 months ago Sleep disturbance    [address] [facility]    Office Visit    10 months ago AK (actinic keratosis)    [facility] Yao Bonilla MD    Office Visit    1 year ago Encounter for annual health examination    Gina Wolff MD    Office Visit

## 2022-10-03 ENCOUNTER — HOSPITAL ENCOUNTER (OUTPATIENT)
Age: 72
Discharge: HOME | End: 2022-10-03
Attending: EMERGENCY MEDICINE
Payer: MEDICARE

## 2022-10-03 VITALS
TEMPERATURE: 99 F | RESPIRATION RATE: 18 BRPM | OXYGEN SATURATION: 97 % | HEIGHT: 70 IN | WEIGHT: 170 LBS | DIASTOLIC BLOOD PRESSURE: 88 MMHG | SYSTOLIC BLOOD PRESSURE: 160 MMHG | BODY MASS INDEX: 24.34 KG/M2 | HEART RATE: 83 BPM

## 2022-10-03 DIAGNOSIS — L98.9 SKIN LESION OF HAND: Primary | ICD-10-CM

## 2022-10-03 PROCEDURE — 99212 OFFICE O/P EST SF 10 MIN: CPT

## 2022-10-03 PROCEDURE — 99203 OFFICE O/P NEW LOW 30 MIN: CPT

## 2022-10-03 NOTE — ED INITIAL ASSESSMENT (HPI)
Pt presents to the IC with c/o a lump on the back of his hand for the last 2 weeks, painful when bumped. Denies fevers or drainage.

## 2022-10-26 ENCOUNTER — TELEPHONE (OUTPATIENT)
Facility: CLINIC | Age: 72
End: 2022-10-26

## 2022-10-26 DIAGNOSIS — Z12.11 SCREEN FOR COLON CANCER: Primary | ICD-10-CM

## 2022-10-28 NOTE — TELEPHONE ENCOUNTER
Patient calling to schedule his 10 year colonoscopy recall. Last Procedure:  Colonoscopy 08/20/2012  Last Diagnosis:  internal hemorrhoids  Recalled for (months or years): 10 years  Sedation used previously:  Fentanyl and versed  Last Prep Used (if known):    Quality of prep (if known): adequate  Anticoagulants:no  Diabetic Meds: no  BP meds(Ace inhibitors/ARB's):no  Weight loss meds (phentermine):no  Iron supplement (RX/OTC):no  Height & Weight/BMI:5'10/170/24.39  Cardiac/CVA issues/Pacemaker/Defibrillator/Stent:no  Resp. Issues/LUIS/Anesthesia:no  Symptoms (Y/N): none    Denies abdominal pain,diarrhea,constipation,weight loss,anemia,rectal bleeding,nausea,vomiting, acid reflux,chest pain or shortness of breath. Allergies and medications reviewed. Patient does not have a latex allergy,pacemaker or sleep apnea. Pharmacy verified. ADMITTED:   08/20/2012        DATE:    08/20/2012. PRE-PROCEDURE DIAGNOSIS:    Colon screening. POST-PROCEDURE DIAGNOSIS:    1. Internal hemorrhoids. 2.    No masses  or polyps noted. SURGEON:   DROI Pratt M.D. OPERATION:   Colonoscopy. TECHNIQUE:    After informed consent was obtained and all   questions were  answered, the patient was sedated with divided doses of Versed 4   mg and  Fentanyl 75 mcg. The patient was positioned in the left lateral   decubitus  position. Digital rectal exam was performed and was normal.  The lubricated   tip of  the Olympus CF series colonoscope was inserted into the anus and   passed to  the cecum as noted by the ileocecal valve and appendiceal orifice  landmarks. Scope was then gradually withdrawn. Careful   examination of the  mucosa was performed both on insertion and removal.  Retroflexion   was  performed in the body of the rectum. No immediate complications. The  patient tolerated the procedure well. Findings outlined below. ASA class  was 2. Prep was adequate. FINDINGS:  1. Small internal hemorrhoids. 2. Otherwise no masses or polyps noted. RECOMMENDATIONS:  1. Symptomatic treatment of hemorrhoids. 2. Consideration for repeat colonoscopy within ten years      Please advise on orders and prep, thank you  Last operative report visible in epic.

## 2022-11-04 ENCOUNTER — HOSPITAL ENCOUNTER (OUTPATIENT)
Dept: ULTRASOUND IMAGING | Age: 72
Discharge: HOME | End: 2022-11-04
Attending: INTERNAL MEDICINE

## 2022-11-04 DIAGNOSIS — Z13.9 ENCOUNTER FOR SCREENING: ICD-10-CM

## 2022-11-04 NOTE — PROGRESS NOTES
Date of Service 11/4/2022    Calderon Nguyen  Date of Birth 3/14/1950    Patient Age: 67year old    PCP: Jeremy Dahl MD  [address] [facility] [address]    Consult Type  Type Scan/Screening: PV Screening (Abdominal Aorta - Normal)        Left Carotid Artery: Normal  Right Carotid Artery: Normal       Body Mass Index  There is no height or weight on file to calculate BMI. Lipid Profile  Cholesterol: 164, done on 5/5/2022. HDL Cholesterol: 65, done on 5/5/2022. LDL Cholesterol: 80, done on 5/5/2022. TriGlycerides 109, done on 5/5/2022. Nurse Review       Recommended Follow Up:  Consult your physician regarding[de-identified] Discuss potential for Incidental Finding;Final Peripheral Vascular Stroke Screen Report    Free PV Screening offered to patient. (Free Carotid and Abdominal Aortic US to be scheduled.)      Recommendations for Change:                       Repeat PV Screening: 3 Years       Josh-Chiren[place] Recommended Resources: Nidia Whiting RN        Please Contact the Nurse Heart Line with any Questions or Concerns [phone].

## 2022-11-05 ENCOUNTER — OFFICE VISIT (OUTPATIENT)
Dept: DERMATOLOGY CLINIC | Facility: CLINIC | Age: 72
End: 2022-11-05
Payer: MEDICARE

## 2022-11-05 DIAGNOSIS — L40.0 PSORIASIS VULGARIS: ICD-10-CM

## 2022-11-05 DIAGNOSIS — Z85.828 HISTORY OF NONMELANOMA SKIN CANCER: ICD-10-CM

## 2022-11-05 DIAGNOSIS — L57.8 SUN-DAMAGED SKIN: ICD-10-CM

## 2022-11-05 DIAGNOSIS — D23.9 BENIGN NEOPLASM OF SKIN, UNSPECIFIED LOCATION: ICD-10-CM

## 2022-11-05 DIAGNOSIS — D48.5 NEOPLASM OF UNCERTAIN BEHAVIOR OF SKIN: Primary | ICD-10-CM

## 2022-11-05 DIAGNOSIS — L81.4 LENTIGO: ICD-10-CM

## 2022-11-05 DIAGNOSIS — L57.0 AK (ACTINIC KERATOSIS): ICD-10-CM

## 2022-11-05 PROCEDURE — 11102 TANGNTL BX SKIN SINGLE LES: CPT | Performed by: DERMATOLOGY

## 2022-11-05 PROCEDURE — 88305 TISSUE EXAM BY PATHOLOGIST: CPT | Performed by: DERMATOLOGY

## 2022-11-05 PROCEDURE — 17000 DESTRUCT PREMALG LESION: CPT | Performed by: DERMATOLOGY

## 2022-11-05 PROCEDURE — 99213 OFFICE O/P EST LOW 20 MIN: CPT | Performed by: DERMATOLOGY

## 2022-11-05 PROCEDURE — 17003 DESTRUCT PREMALG LES 2-14: CPT | Performed by: DERMATOLOGY

## 2022-11-05 NOTE — PROGRESS NOTES
Operative Report                     Shave/  Tangential biopsy     Clinical diagnosis:    Size of lesion:    Location:pt with tender new lesionon hand,growing painful  Spec 1 Description >>>>>: right dorsal hand  Spec 1 Comment: r/o SCC keratotic papule tender more elevated area 4mm centrally base 1cm    Procedure: With patient in appropriate position the skin of the above was scrubbed with alcohol. Anesthesia was obtained with 1% Xylocaine with epinephrine. The skin surrounding the lesion was placed under tension and the lesion was incised using a #15 scalpel blade. The specimen was sent for histopathologic exam.    Hemostasis was obtained with electrocautery/aluminum chloride. Estimated blood loss less than 2 cc. Biopsy dressed with Polysporin, bandage. Pressure dressing:   No    Complications: None    Written instructions given and reviewed with patient    Await pathology    Contact information reviewed.     Procedural physician:  Lilli Salas MD

## 2022-11-07 NOTE — TELEPHONE ENCOUNTER
Dr. Aretha Padilla    Patient called again wanting to schedule colonoscopy. Please see message below with screening and provide orders to schedule if appropriate.     Thank you

## 2022-11-07 NOTE — TELEPHONE ENCOUNTER
Patient is calling to follow up and is requesting call back to schedule colonoscopy.  Call back # [phone]

## 2022-11-18 NOTE — TELEPHONE ENCOUNTER
Dr. Ema Grider -    Please see message below from pt. He would like to schedule with Dr. Reanna Hodges. Please advise if that is ok, thank you!

## 2022-11-19 NOTE — TELEPHONE ENCOUNTER
Scheduled for:  Colonoscopy [address]  Provider Name:  Dr Kota Cardona pt choice and ok'd by Dr Prince Retana  Date:  01/30/2023  Location:  [facility]  Sedation:  MAC  Time:  2:00pm ( pt is aware arrival time is at 1:00pm)  Prep:  Golytely  Meds/Allergies Reconciled?:  Yes  Diagnosis with codes:  Colon Screening Z12.11  Was patient informed to call insurance with codes (Y/N):  Yes  Referral sent?:  Referral was sent at the time of electronic surgical scheduling. [address] [place] [address] or [facility] notified?:  I sent an electronic request to Endo Scheduling and received a confirmation today. Medication Orders:  Pt is aware to NOT take iron pills, herbal meds and diet supplements for 7 days before exam. Also to NOT take any form of alcohol, recreational drugs and any forms of ED meds 24 hours before exam.   Misc Orders:  Patient was informed that they will need a COVID 19 test prior to their procedure. Patient verbally understood & will await a phone call from [facility] to schedule. Further instructions given by staff: I provide prep instructions to patient via [employer] and reviewed date, time and location, he verbalized that he understood and is aware to call if he has any questions.

## 2022-12-01 ENCOUNTER — OFFICE VISIT (OUTPATIENT)
Dept: DERMATOLOGY CLINIC | Facility: CLINIC | Age: 72
End: 2022-12-01
Payer: MEDICARE

## 2022-12-01 DIAGNOSIS — L57.0 AK (ACTINIC KERATOSIS): Primary | ICD-10-CM

## 2022-12-01 DIAGNOSIS — D22.9 MULTIPLE NEVI: ICD-10-CM

## 2022-12-01 DIAGNOSIS — D23.9 BENIGN NEOPLASM OF SKIN, UNSPECIFIED LOCATION: ICD-10-CM

## 2022-12-01 DIAGNOSIS — Z85.828 HISTORY OF NONMELANOMA SKIN CANCER: ICD-10-CM

## 2022-12-01 DIAGNOSIS — L81.4 LENTIGO: ICD-10-CM

## 2022-12-01 PROCEDURE — 99213 OFFICE O/P EST LOW 20 MIN: CPT | Performed by: DERMATOLOGY

## 2022-12-01 PROCEDURE — 1126F AMNT PAIN NOTED NONE PRSNT: CPT | Performed by: DERMATOLOGY

## 2023-01-30 ENCOUNTER — ANESTHESIA EVENT (OUTPATIENT)
Dept: ENDOSCOPY | Age: 73
End: 2023-01-30
Payer: MEDICARE

## 2023-01-30 ENCOUNTER — HOSPITAL ENCOUNTER (OUTPATIENT)
Age: 73
Setting detail: HOSPITAL OUTPATIENT SURGERY
Discharge: HOME | End: 2023-01-30
Attending: STUDENT IN AN ORGANIZED HEALTH CARE EDUCATION/TRAINING PROGRAM | Admitting: STUDENT IN AN ORGANIZED HEALTH CARE EDUCATION/TRAINING PROGRAM
Payer: MEDICARE

## 2023-01-30 ENCOUNTER — ANESTHESIA (OUTPATIENT)
Dept: ENDOSCOPY | Age: 73
End: 2023-01-30
Payer: MEDICARE

## 2023-01-30 VITALS
SYSTOLIC BLOOD PRESSURE: 120 MMHG | BODY MASS INDEX: 24.34 KG/M2 | HEART RATE: 73 BPM | OXYGEN SATURATION: 98 % | DIASTOLIC BLOOD PRESSURE: 84 MMHG | RESPIRATION RATE: 18 BRPM | TEMPERATURE: 97 F | HEIGHT: 70 IN | WEIGHT: 170 LBS

## 2023-01-30 DIAGNOSIS — Z12.11 SCREEN FOR COLON CANCER: ICD-10-CM

## 2023-01-30 PROCEDURE — 88305 TISSUE EXAM BY PATHOLOGIST: CPT | Performed by: STUDENT IN AN ORGANIZED HEALTH CARE EDUCATION/TRAINING PROGRAM

## 2023-01-30 RX ORDER — SODIUM CHLORIDE, SODIUM LACTATE, POTASSIUM CHLORIDE, CALCIUM CHLORIDE 600; 310; 30; 20 MG/100ML; MG/100ML; MG/100ML; MG/100ML
INJECTION, SOLUTION INTRAVENOUS CONTINUOUS
Status: DISCONTINUED | OUTPATIENT
Start: 2023-01-30 | End: 2023-01-30

## 2023-01-30 RX ORDER — LIDOCAINE HYDROCHLORIDE 10 MG/ML
INJECTION, SOLUTION EPIDURAL; INFILTRATION; INTRACAUDAL; PERINEURAL AS NEEDED
Status: DISCONTINUED | OUTPATIENT
Start: 2023-01-30 | End: 2023-01-30 | Stop reason: SURG

## 2023-01-30 RX ADMIN — SODIUM CHLORIDE, SODIUM LACTATE, POTASSIUM CHLORIDE, CALCIUM CHLORIDE: 600; 310; 30; 20 INJECTION, SOLUTION INTRAVENOUS at 13:20:00

## 2023-01-30 RX ADMIN — SODIUM CHLORIDE, SODIUM LACTATE, POTASSIUM CHLORIDE, CALCIUM CHLORIDE: 600; 310; 30; 20 INJECTION, SOLUTION INTRAVENOUS at 13:55:00

## 2023-01-30 RX ADMIN — LIDOCAINE HYDROCHLORIDE 25 MG: 10 INJECTION, SOLUTION EPIDURAL; INFILTRATION; INTRACAUDAL; PERINEURAL at 13:21:00

## 2023-01-30 NOTE — ANESTHESIA POSTPROCEDURE EVALUATION
Patient: Marcial Davis    Procedure Summary     Date: 01/30/23 Room / Location: [facility] ENDOSCOPY 02 / [address] [facility] ENDO    Anesthesia Start: 1320 Anesthesia Stop: 5096    Procedure: COLONOSCOPY Diagnosis:       Screen for colon cancer      (fairprep, polyps, hemorrhoids)    Surgeons: Justa Ochoa MD Anesthesiologist:     Anesthesia Type: general ASA Status: 2          Anesthesia Type: general    Vitals Value Taken Time   /74 01/30/23 1400   Temp 98 01/30/23 1400   Pulse 76 01/30/23 1400   Resp 20 01/30/23 1400   SpO2 97 01/30/23 1400       [facility] AN Post Evaluation:   Patient Evaluated in PACU  Patient Participation: complete - patient participated  Level of Consciousness: awake  Pain Management: adequate  Airway Patency:patent  Dental exam unchanged from preop  Yes    Cardiovascular Status: acceptable  Respiratory Status: acceptable  Postoperative Hydration acceptable      Esteban Cho MD  1/30/2023 2:00 PM

## 2023-01-30 NOTE — DISCHARGE INSTRUCTIONS
Home Care Instructions for Colonoscopy and/or Gastroscopy with Sedation    Diet:  - Resume your regular diet as tolerated unless otherwise instructed. - Start with light meals to minimize bloating.  - Do not drink alcohol today. Medication:  - If you have questions about resuming your normal medications, please contact your Primary Care Physician. Activities:  - Take it easy today. Do not return to work today. - Do not drive today. - Do not operate any machinery today (including kitchen equipment). -     Don't sign any legal documents or make critical decisions. Colonoscopy:  - You may notice some rectal \"spotting\" (a little blood on the toilet tissue) for a day or two after the exam. This is normal.  - If you experience any rectal bleeding (not spotting), persistent tenderness or sharp severe abdominal pains, oral temperature over 100 degrees Fahrenheit, light-headedness or dizziness, or any other problems, contact your doctor. ctor. **If unable to reach your doctor, please go to the [facility] Emergency Room**    - Your referring physician will receive a full report of your examination.  - If you do not hear from your doctor's office within two weeks of your biopsy, please call them for your results.

## 2023-01-31 NOTE — PROGRESS NOTES
GI Staff:    Can you please place recall for this patient to have a colonoscopy in 6 months due to poor bowel prep. Thank you.     Nanda Hartman MD

## 2023-02-01 ENCOUNTER — TELEPHONE (OUTPATIENT)
Facility: CLINIC | Age: 73
End: 2023-02-01

## 2023-02-01 NOTE — TELEPHONE ENCOUNTER
----- Message from Tae Morgan MD sent at 1/31/2023  2:31 PM CST -----  GI Staff:    Can you please place recall for this patient to have a colonoscopy in 6 months due to poor bowel prep. Thank you.     Tae Morgan MD

## 2023-02-01 NOTE — TELEPHONE ENCOUNTER
Recall colon in 6 months per Dr Celina Weston .  Colon done 01/30/2023    Health maintenance updated and message sent to pt outreach to repeat colonoscopy in 6 months due to poor bowel prep

## 2023-03-30 ENCOUNTER — TELEPHONE (OUTPATIENT)
Facility: CLINIC | Age: 73
End: 2023-03-30

## 2023-03-30 NOTE — TELEPHONE ENCOUNTER
Patient outreach message received:    Recall colon in 6 months per Dr Lorin Napier . Colon done 01/30/2023    Recall reminder letter mailed out to patient.

## 2023-04-20 ENCOUNTER — TELEPHONE (OUTPATIENT)
Facility: CLINIC | Age: 73
End: 2023-04-20

## 2023-04-20 DIAGNOSIS — Z12.11 SPECIAL SCREENING FOR MALIGNANT NEOPLASMS, COLON: Primary | ICD-10-CM

## 2023-04-20 NOTE — TELEPHONE ENCOUNTER
Dr. Infante Meals    Patient recently seen by you back in January and advised to do recall colonoscopy in 6 months. Can he have orders to schedule directly?     Thank you

## 2023-04-22 NOTE — TELEPHONE ENCOUNTER
Dx: prior colonoscopy with poor bowel preparation  Colonoscopy with MAC sedation  Split dose suprep, if suprep not covered then golytely or moviprep, sent to pharmacy. Additionally, he will use Miralax 2 capfuls per day for 2 weeks prior to the colonoscopy (this will need to be picked up from the pharmacy). Please review prep instructions with patient. Okay to schedule the patient with me for colonoscopy, does not need clinic visit. - If the patient is taking oral diabetic medications then they should HOLD diabetic medications the night before and/or the day of the procedure - If the patient is on insulin, please have the PCP or endocrinologist assist with management of dosing prior to the procedure.  - NO herbal supplements or weight loss medications x 7 days prior to the procedure. - If patient is taking Xarelto OR Eliquis then it needs to be helf for 48 hours prior to the procedure --> Should get approval from the prescribing physician (PCP or cardiologist) to hold this medication prior to the procedure. - If the patient is taking Coumadin then it needs to be on hold Coumadin for 5 days prior to the procedure --> Should get approval from the prescribing physician (PCP or cardiologist) to hold this medication prior to the procedure. *If the bowel prep prescribed is too expensive/not covered by the patient's insurance please pend an alternative and I will sign that order. Thank you.

## 2023-07-09 ENCOUNTER — PATIENT MESSAGE (OUTPATIENT)
Dept: INTERNAL MEDICINE CLINIC | Facility: CLINIC | Age: 73
End: 2023-07-09

## 2023-07-11 ENCOUNTER — NURSE TRIAGE (OUTPATIENT)
Dept: INTERNAL MEDICINE CLINIC | Facility: CLINIC | Age: 73
End: 2023-07-11

## 2023-07-11 NOTE — TELEPHONE ENCOUNTER
Action Requested: Summary for Provider     []  Critical Lab, Recommendations Needed  [] Need Additional Advice  []   FYI    []   Need Orders  [] Need Medications Sent to Pharmacy  []  Other     SUMMARY: Per protocol, patient should be seen in the office within 2 weeks. Appointment scheduled. Future Appointments   Date Time Provider Rosanna Dumont   7/20/2023  9:45 AM GRANADOS, PROCEDURE [address] None   7/21/2023 11:40 AM [facility]   9/7/2023 12:00 PM Kirsten Langley MD [facility] [place]      Reason for call: Hand Pain  Onset: Chronic    Patient reports of chronic right hand pain (unsure if it is maybe carpal tunnel). He states right hand is sore, painful and fingers are hard to bend especially the right index finger. This has been going on for quite a while now, less than a year.  is compromised with the affected hand and has been using the left hand more. He has been taking Advil and ibuprofen. He also complained of right knee pain, has trouble bending and hurts to lift leg as well when putting pants on. He has noticed this for the past couple of months along with losing his balance. He wants to see PCP. Patient verbalized understanding and agreed with plan of care. Appointment scheduled as above.     Reason for Disposition   Hand or wrist pain is a chronic symptom (recurrent or ongoing AND lasting > 4 weeks)    Protocols used: Hand and Wrist Pain-A-OH

## 2023-07-14 ENCOUNTER — TELEPHONE (OUTPATIENT)
Facility: CLINIC | Age: 73
End: 2023-07-14

## 2023-07-14 RX ORDER — SODIUM, POTASSIUM,MAG SULFATES 17.5-3.13G
SOLUTION, RECONSTITUTED, ORAL ORAL
Qty: 1 EACH | Refills: 0 | Status: SHIPPED | OUTPATIENT
Start: 2023-07-14

## 2023-07-14 NOTE — TELEPHONE ENCOUNTER
Prep sent to patients pharmacy. Detailed message (HIPAA verified) left on patients mobile number informing script being sent to pharmacy. Office number provided if any questions.

## 2023-07-20 ENCOUNTER — SURGERY CENTER DOCUMENTATION (OUTPATIENT)
Age: 73
End: 2023-07-20

## 2023-07-20 NOTE — PROCEDURES
COLONOSCOPY REPORT    Trudi Altman     3/14/1950 Age 75year old   PCP Yuliana Enciso MD Endoscopist Shannon Shah MD       Date of procedure: 23    Location: [address] [place] [address])    Procedure: Colonoscopy w/ cold snare polypectomy    Pre-operative diagnosis: history of fair/poor prep and colon polyps    Post-operative diagnosis: colon polyp, hemorrhoids    Medications: MAC    Withdrawal time: 14 minutes    Procedure:  Informed consent was obtained from the patient after the risks of the procedure were discussed, including but not limited to bleeding, perforation, aspiration, infection, or possibility of a missed lesion. After discussions of the risks/benefits and alternatives to this procedure, as well as the planned sedation, the patient was placed in the left lateral decubitus position and begun on continuous blood pressure pulse oximetry and EKG monitoring and this was maintained throughout the procedure. Once an adequate level of sedation was obtained a digital rectal exam was completed. Then the lubricated tip of the [id number] diagnostic video colonoscope was inserted and advanced without difficulty to the cecum using water immersion and CO2 insufflation technique. The cecum was identified by localizing the trifold, the appendix and the ileocecal valve. Withdrawal was begun with thorough washing and careful examination of the colonic walls and folds. A routine second examination of the cecum/ascending colon was performed. Photodocumentation was obtained. The bowel prep was good. Views of the colon were good with washing. I then carefully withdrew the instrument from the patient who tolerated the procedure well. Complications: none. Findings:   1. 1 polyp noted as follows:      A. 3 mm polyp in the transverse colon; sessile morphology; cold snare polypectomy performed, the polyp was not retrieved.  The site was viewed after resection and no residual polyp tissue was seen. 2. Diverticulosis: None visualized. 3. Ileocecal valve: the visualized mucosa appeared normal.    4. The colonic mucosa throughout the colon showed normal vascular pattern, without evidence of angioectasias or inflammation. 5. A retroflexed view of the rectum revealed small internal hemorrhoids. 6. SUELLEN: normal rectal tone, no masses palpated. Impression:   3 mm polyp removed. The colon was otherwise normal with glistening mucosa and intact vascular pattern throughout. Recommend:  Await pathology. The interval for the next colonoscopy will be determined after reviewing pathology. If new signs or symptoms develop, colonoscopy may need to be repeated sooner. High fiber diet. Monitor for blood in the stool. If having more than just tinge of blood, call office or go to the ER. Consider repeat in 5-7 years pending discussion in clinic visit.    >>>If tissue was obtained and you have not received your pathology results either by phone or letter within 2 weeks, please call our office at . Specimens: none.     Blood loss: <1 ml

## 2023-07-21 ENCOUNTER — HOSPITAL ENCOUNTER (OUTPATIENT)
Dept: GENERAL RADIOLOGY | Age: 73
Discharge: HOME | End: 2023-07-21
Attending: INTERNAL MEDICINE
Payer: MEDICARE

## 2023-07-21 ENCOUNTER — OFFICE VISIT (OUTPATIENT)
Dept: INTERNAL MEDICINE CLINIC | Facility: CLINIC | Age: 73
End: 2023-07-21

## 2023-07-21 VITALS
BODY MASS INDEX: 25.08 KG/M2 | DIASTOLIC BLOOD PRESSURE: 80 MMHG | HEIGHT: 70 IN | SYSTOLIC BLOOD PRESSURE: 132 MMHG | TEMPERATURE: 98 F | OXYGEN SATURATION: 97 % | WEIGHT: 175.19 LBS | HEART RATE: 69 BPM | RESPIRATION RATE: 18 BRPM

## 2023-07-21 DIAGNOSIS — E78.5 HYPERLIPIDEMIA, UNSPECIFIED HYPERLIPIDEMIA TYPE: Primary | ICD-10-CM

## 2023-07-21 DIAGNOSIS — G89.29 CHRONIC PAIN OF RIGHT KNEE: ICD-10-CM

## 2023-07-21 DIAGNOSIS — N52.9 ERECTILE DYSFUNCTION, UNSPECIFIED ERECTILE DYSFUNCTION TYPE: ICD-10-CM

## 2023-07-21 DIAGNOSIS — Z12.5 SCREENING FOR PROSTATE CANCER: ICD-10-CM

## 2023-07-21 DIAGNOSIS — M79.641 RIGHT HAND PAIN: ICD-10-CM

## 2023-07-21 DIAGNOSIS — G47.33 OSA (OBSTRUCTIVE SLEEP APNEA): ICD-10-CM

## 2023-07-21 DIAGNOSIS — M25.561 CHRONIC PAIN OF RIGHT KNEE: ICD-10-CM

## 2023-07-21 PROCEDURE — 99214 OFFICE O/P EST MOD 30 MIN: CPT | Performed by: INTERNAL MEDICINE

## 2023-07-21 PROCEDURE — 73562 X-RAY EXAM OF KNEE 3: CPT | Performed by: INTERNAL MEDICINE

## 2023-07-21 PROCEDURE — 1126F AMNT PAIN NOTED NONE PRSNT: CPT | Performed by: INTERNAL MEDICINE

## 2023-07-21 PROCEDURE — 73130 X-RAY EXAM OF HAND: CPT | Performed by: INTERNAL MEDICINE

## 2023-07-21 RX ORDER — IPRATROPIUM BROMIDE 21 UG/1
SPRAY, METERED NASAL
COMMUNITY
Start: 2023-03-01 | End: 2023-07-21

## 2023-07-23 ENCOUNTER — PATIENT MESSAGE (OUTPATIENT)
Dept: INTERNAL MEDICINE CLINIC | Facility: CLINIC | Age: 73
End: 2023-07-23

## 2023-07-23 NOTE — TELEPHONE ENCOUNTER
From: Reagan Hodgson  To: Nora Hairston MD  Sent: 7/23/2023 7:39 AM CDT  Subject: Hand pain    Please send a referral for a hand specialist. I would like to pursue options to help with the pain. Thanks.

## 2023-07-24 ENCOUNTER — TELEPHONE (OUTPATIENT)
Dept: ORTHOPEDICS CLINIC | Facility: CLINIC | Age: 73
End: 2023-07-24

## 2023-07-24 ENCOUNTER — LAB ENCOUNTER (OUTPATIENT)
Dept: LAB | Age: 73
End: 2023-07-24
Attending: INTERNAL MEDICINE
Payer: MEDICARE

## 2023-07-24 DIAGNOSIS — E78.5 HYPERLIPIDEMIA, UNSPECIFIED HYPERLIPIDEMIA TYPE: ICD-10-CM

## 2023-07-24 DIAGNOSIS — Z12.5 SCREENING FOR PROSTATE CANCER: ICD-10-CM

## 2023-07-24 DIAGNOSIS — M79.641 RIGHT HAND PAIN: ICD-10-CM

## 2023-07-24 DIAGNOSIS — M79.641 RIGHT HAND PAIN: Primary | ICD-10-CM

## 2023-07-24 LAB
ALBUMIN SERPL-MCNC: 3.8 G/DL (ref 3.4–5)
ALBUMIN/GLOB SERPL: 1.1 {RATIO} (ref 1–2)
ALP LIVER SERPL-CCNC: 64 U/L
ALT SERPL-CCNC: 39 U/L
ANION GAP SERPL CALC-SCNC: 5 MMOL/L (ref 0–18)
AST SERPL-CCNC: 29 U/L (ref 15–37)
BASOPHILS # BLD AUTO: 0.02 X10(3) UL (ref 0–0.2)
BASOPHILS NFR BLD AUTO: 0.4 %
BILIRUB SERPL-MCNC: 0.9 MG/DL (ref 0.1–2)
BUN BLD-MCNC: 17 MG/DL (ref 7–18)
BUN/CREAT SERPL: 14.8 (ref 10–20)
CALCIUM BLD-MCNC: 9.1 MG/DL (ref 8.5–10.1)
CHLORIDE SERPL-SCNC: 111 MMOL/L (ref 98–112)
CHOLEST SERPL-MCNC: 149 MG/DL (ref ?–200)
CO2 SERPL-SCNC: 27 MMOL/L (ref 21–32)
COMPLEXED PSA SERPL-MCNC: 1.3 NG/ML (ref ?–4)
CREAT BLD-MCNC: 1.15 MG/DL
DEPRECATED RDW RBC AUTO: 38.6 FL (ref 35.1–46.3)
EGFRCR SERPLBLD CKD-EPI 2021: 67 ML/MIN/1.73M2 (ref 60–?)
EOSINOPHIL # BLD AUTO: 0.13 X10(3) UL (ref 0–0.7)
EOSINOPHIL NFR BLD AUTO: 2.9 %
ERYTHROCYTE [DISTWIDTH] IN BLOOD BY AUTOMATED COUNT: 11.6 % (ref 11–15)
ERYTHROCYTE [SEDIMENTATION RATE] IN BLOOD: 3 MM/HR
FASTING PATIENT LIPID ANSWER: YES
FASTING STATUS PATIENT QL REPORTED: YES
GLOBULIN PLAS-MCNC: 3.6 G/DL (ref 2.8–4.4)
GLUCOSE BLD-MCNC: 96 MG/DL (ref 70–99)
HCT VFR BLD AUTO: 40.3 %
HDLC SERPL-MCNC: 59 MG/DL (ref 40–59)
HGB BLD-MCNC: 13.8 G/DL
IMM GRANULOCYTES # BLD AUTO: 0.01 X10(3) UL (ref 0–1)
IMM GRANULOCYTES NFR BLD: 0.2 %
LDLC SERPL CALC-MCNC: 73 MG/DL (ref ?–100)
LYMPHOCYTES # BLD AUTO: 1.43 X10(3) UL (ref 1–4)
LYMPHOCYTES NFR BLD AUTO: 31.9 %
MCH RBC QN AUTO: 30.9 PG (ref 26–34)
MCHC RBC AUTO-ENTMCNC: 34.2 G/DL (ref 31–37)
MCV RBC AUTO: 90.4 FL
MONOCYTES # BLD AUTO: 0.43 X10(3) UL (ref 0.1–1)
MONOCYTES NFR BLD AUTO: 9.6 %
NEUTROPHILS # BLD AUTO: 2.46 X10 (3) UL (ref 1.5–7.7)
NEUTROPHILS # BLD AUTO: 2.46 X10(3) UL (ref 1.5–7.7)
NEUTROPHILS NFR BLD AUTO: 55 %
NONHDLC SERPL-MCNC: 90 MG/DL (ref ?–130)
OSMOLALITY SERPL CALC.SUM OF ELEC: 297 MOSM/KG (ref 275–295)
PLATELET # BLD AUTO: 196 10(3)UL (ref 150–450)
POTASSIUM SERPL-SCNC: 4.3 MMOL/L (ref 3.5–5.1)
PROT SERPL-MCNC: 7.4 G/DL (ref 6.4–8.2)
RBC # BLD AUTO: 4.46 X10(6)UL
RHEUMATOID FACT SERPL-ACNC: <10 IU/ML (ref ?–15)
SODIUM SERPL-SCNC: 143 MMOL/L (ref 136–145)
TRIGL SERPL-MCNC: 89 MG/DL (ref 30–149)
TSI SER-ACNC: 2.07 MIU/ML (ref 0.36–3.74)
VIT B12 SERPL-MCNC: 363 PG/ML (ref 193–986)
VLDLC SERPL CALC-MCNC: 14 MG/DL (ref 0–30)
WBC # BLD AUTO: 4.5 X10(3) UL (ref 4–11)

## 2023-07-24 PROCEDURE — 85652 RBC SED RATE AUTOMATED: CPT

## 2023-07-24 PROCEDURE — 36415 COLL VENOUS BLD VENIPUNCTURE: CPT

## 2023-07-24 PROCEDURE — 80061 LIPID PANEL: CPT

## 2023-07-24 PROCEDURE — 84443 ASSAY THYROID STIM HORMONE: CPT

## 2023-07-24 PROCEDURE — 86431 RHEUMATOID FACTOR QUANT: CPT

## 2023-07-24 PROCEDURE — 85025 COMPLETE CBC W/AUTO DIFF WBC: CPT

## 2023-07-24 PROCEDURE — 80053 COMPREHEN METABOLIC PANEL: CPT

## 2023-07-24 PROCEDURE — 82607 VITAMIN B-12: CPT

## 2023-07-24 NOTE — TELEPHONE ENCOUNTER
Imaging was completed for this patient for a RT Hand PAIN, but it needs to be reviewed to see if additional imaging is needed. If so, please enter the appropriate RX and let the patient know to come in before their appointment to complete the additional imaging. Thank you!     Future Appointments   Date Time Provider Rosanna Dumont   8/8/2023  8:40 AM WILLIAN Rai [facility] [place]   8/14/2023  2:00 PM Gemma Douglas MD [facility] [place]   9/7/2023 12:00 PM Jessie Carnes MD [facility] [place]

## 2023-07-25 ENCOUNTER — OFFICE VISIT (OUTPATIENT)
Dept: ORTHOPEDICS CLINIC | Facility: CLINIC | Age: 73
End: 2023-07-25
Payer: MEDICARE

## 2023-07-25 VITALS — WEIGHT: 175 LBS | BODY MASS INDEX: 25.05 KG/M2 | HEIGHT: 70 IN

## 2023-07-25 DIAGNOSIS — M19.049 ARTHRITIS OF FINGER: Primary | ICD-10-CM

## 2023-07-25 PROCEDURE — 1125F AMNT PAIN NOTED PAIN PRSNT: CPT | Performed by: PHYSICIAN ASSISTANT

## 2023-07-25 PROCEDURE — 99203 OFFICE O/P NEW LOW 30 MIN: CPT | Performed by: PHYSICIAN ASSISTANT

## 2023-07-25 PROCEDURE — 20600 DRAIN/INJ JOINT/BURSA W/O US: CPT | Performed by: PHYSICIAN ASSISTANT

## 2023-07-25 RX ORDER — BETAMETHASONE SODIUM PHOSPHATE AND BETAMETHASONE ACETATE 3; 3 MG/ML; MG/ML
6 INJECTION, SUSPENSION INTRA-ARTICULAR; INTRALESIONAL; INTRAMUSCULAR; SOFT TISSUE ONCE
Status: COMPLETED | OUTPATIENT
Start: 2023-07-25 | End: 2023-07-25

## 2023-07-25 RX ADMIN — BETAMETHASONE SODIUM PHOSPHATE AND BETAMETHASONE ACETATE 6 MG: 3; 3 INJECTION, SUSPENSION INTRA-ARTICULAR; INTRALESIONAL; INTRAMUSCULAR; SOFT TISSUE at 14:36:00

## 2023-08-14 ENCOUNTER — OFFICE VISIT (OUTPATIENT)
Dept: ORTHOPEDICS CLINIC | Facility: CLINIC | Age: 73
End: 2023-08-14
Payer: MEDICARE

## 2023-08-14 ENCOUNTER — HOSPITAL ENCOUNTER (OUTPATIENT)
Dept: GENERAL RADIOLOGY | Age: 73
Discharge: HOME | End: 2023-08-14
Attending: ORTHOPAEDIC SURGERY
Payer: MEDICARE

## 2023-08-14 VITALS — BODY MASS INDEX: 25.05 KG/M2 | HEIGHT: 70 IN | WEIGHT: 175 LBS

## 2023-08-14 DIAGNOSIS — Z01.89 ENCOUNTER FOR LOWER EXTREMITY COMPARISON IMAGING STUDY: ICD-10-CM

## 2023-08-14 DIAGNOSIS — M22.2X1 PATELLOFEMORAL PAIN SYNDROME OF RIGHT KNEE: Primary | ICD-10-CM

## 2023-08-14 DIAGNOSIS — M17.11 PRIMARY OSTEOARTHRITIS OF RIGHT KNEE: ICD-10-CM

## 2023-08-14 DIAGNOSIS — M25.561 RIGHT KNEE PAIN, UNSPECIFIED CHRONICITY: ICD-10-CM

## 2023-08-14 PROCEDURE — 1126F AMNT PAIN NOTED NONE PRSNT: CPT | Performed by: ORTHOPAEDIC SURGERY

## 2023-08-14 PROCEDURE — 99203 OFFICE O/P NEW LOW 30 MIN: CPT | Performed by: ORTHOPAEDIC SURGERY

## 2023-08-14 PROCEDURE — 73564 X-RAY EXAM KNEE 4 OR MORE: CPT | Performed by: ORTHOPAEDIC SURGERY

## 2023-09-07 ENCOUNTER — OFFICE VISIT (OUTPATIENT)
Dept: DERMATOLOGY CLINIC | Facility: CLINIC | Age: 73
End: 2023-09-07

## 2023-09-07 DIAGNOSIS — L40.0 PSORIASIS VULGARIS: ICD-10-CM

## 2023-09-07 DIAGNOSIS — Z85.828 HISTORY OF NONMELANOMA SKIN CANCER: ICD-10-CM

## 2023-09-07 DIAGNOSIS — L57.0 AK (ACTINIC KERATOSIS): Primary | ICD-10-CM

## 2023-09-07 DIAGNOSIS — D23.9 BENIGN NEOPLASM OF SKIN, UNSPECIFIED LOCATION: ICD-10-CM

## 2023-09-07 DIAGNOSIS — L57.8 SUN-DAMAGED SKIN: ICD-10-CM

## 2023-09-07 DIAGNOSIS — L81.4 LENTIGO: ICD-10-CM

## 2023-09-07 DIAGNOSIS — D22.9 MULTIPLE NEVI: ICD-10-CM

## 2023-09-07 RX ORDER — HYDROCORTISONE 25 MG/ML
LOTION TOPICAL
Qty: 120 ML | Refills: 3 | Status: SHIPPED | OUTPATIENT
Start: 2023-09-07

## 2023-09-07 RX ORDER — CLOBETASOL PROPIONATE 0.5 MG/G
OINTMENT TOPICAL
Qty: 60 G | Refills: 5 | Status: SHIPPED | OUTPATIENT
Start: 2023-09-07

## 2024-02-21 ENCOUNTER — NURSE TRIAGE (OUTPATIENT)
Dept: INTERNAL MEDICINE CLINIC | Facility: CLINIC | Age: 74
End: 2024-02-21

## 2024-02-21 NOTE — TELEPHONE ENCOUNTER
Action Requested: Summary for Provider     []  Critical Lab, Recommendations Needed  [] Need Additional Advice  [x]   FYI    []   Need Orders  [] Need Medications Sent to Pharmacy  []  Other     SUMMARY: Per protocol, patient should be seen in the office within 2 weeks. Appointment scheduled.    Future Appointments   Date Time Provider Department Center   2/22/2024  9:00 AM Thu Haines APRN [facility]     Reason for call: Dizziness  Onset: 1 Month Ago    Patient reports of dizziness for a month now. Also complains of balance issue which has started happening months ago. States he usually feels dizzy when walking longer distance especially after he stops walking, also when bending over. This makes his balance issue worse. He stops whenever this happens and rest. Denies feeling faint. Denies breathing issues. Eating and hydrating well. Requesting an appointment for evaluation. Care advice given per protocol. Patient verbalized understanding and agreed with plan of care.    Reason for Disposition   Dizziness not present now, but is a chronic symptom (recurrent or ongoing AND lasting > 4 weeks)    Protocols used: Dizziness-A-OH

## 2024-02-22 ENCOUNTER — LAB ENCOUNTER (OUTPATIENT)
Dept: LAB | Age: 74
End: 2024-02-22
Attending: NURSE PRACTITIONER
Payer: MEDICARE

## 2024-02-22 ENCOUNTER — OFFICE VISIT (OUTPATIENT)
Dept: INTERNAL MEDICINE CLINIC | Facility: CLINIC | Age: 74
End: 2024-02-22
Payer: MEDICARE

## 2024-02-22 VITALS
DIASTOLIC BLOOD PRESSURE: 86 MMHG | WEIGHT: 182 LBS | HEART RATE: 97 BPM | SYSTOLIC BLOOD PRESSURE: 127 MMHG | BODY MASS INDEX: 26.05 KG/M2 | HEIGHT: 70 IN | RESPIRATION RATE: 16 BRPM

## 2024-02-22 DIAGNOSIS — R42 DIZZINESS: Primary | ICD-10-CM

## 2024-02-22 DIAGNOSIS — R42 DIZZINESS: ICD-10-CM

## 2024-02-22 LAB
ALBUMIN SERPL-MCNC: 4.6 G/DL (ref 3.2–4.8)
ALBUMIN/GLOB SERPL: 1.6 {RATIO} (ref 1–2)
ALP LIVER SERPL-CCNC: 70 U/L
ALT SERPL-CCNC: 29 U/L
ANION GAP SERPL CALC-SCNC: 6 MMOL/L (ref 0–18)
AST SERPL-CCNC: 29 U/L (ref ?–34)
ATRIAL RATE: 77 BPM
BASOPHILS # BLD AUTO: 0.02 X10(3) UL (ref 0–0.2)
BASOPHILS NFR BLD AUTO: 0.4 %
BILIRUB SERPL-MCNC: 0.8 MG/DL (ref 0.2–1.1)
BUN BLD-MCNC: 22 MG/DL (ref 9–23)
BUN/CREAT SERPL: 19 (ref 10–20)
CALCIUM BLD-MCNC: 9.5 MG/DL (ref 8.7–10.4)
CHLORIDE SERPL-SCNC: 108 MMOL/L (ref 98–112)
CO2 SERPL-SCNC: 28 MMOL/L (ref 21–32)
CREAT BLD-MCNC: 1.16 MG/DL
DEPRECATED RDW RBC AUTO: 38.6 FL (ref 35.1–46.3)
EGFRCR SERPLBLD CKD-EPI 2021: 66 ML/MIN/1.73M2 (ref 60–?)
EOSINOPHIL # BLD AUTO: 0.08 X10(3) UL (ref 0–0.7)
EOSINOPHIL NFR BLD AUTO: 1.5 %
ERYTHROCYTE [DISTWIDTH] IN BLOOD BY AUTOMATED COUNT: 11.8 % (ref 11–15)
FASTING STATUS PATIENT QL REPORTED: NO
GLOBULIN PLAS-MCNC: 2.9 G/DL (ref 2.8–4.4)
GLUCOSE BLD-MCNC: 92 MG/DL (ref 70–99)
HCT VFR BLD AUTO: 41.9 %
HGB BLD-MCNC: 14.9 G/DL
IMM GRANULOCYTES # BLD AUTO: 0.01 X10(3) UL (ref 0–1)
IMM GRANULOCYTES NFR BLD: 0.2 %
LYMPHOCYTES # BLD AUTO: 1.45 X10(3) UL (ref 1–4)
LYMPHOCYTES NFR BLD AUTO: 26.7 %
MCH RBC QN AUTO: 32 PG (ref 26–34)
MCHC RBC AUTO-ENTMCNC: 35.6 G/DL (ref 31–37)
MCV RBC AUTO: 89.9 FL
MONOCYTES # BLD AUTO: 0.59 X10(3) UL (ref 0.1–1)
MONOCYTES NFR BLD AUTO: 10.9 %
NEUTROPHILS # BLD AUTO: 3.28 X10 (3) UL (ref 1.5–7.7)
NEUTROPHILS # BLD AUTO: 3.28 X10(3) UL (ref 1.5–7.7)
NEUTROPHILS NFR BLD AUTO: 60.3 %
OSMOLALITY SERPL CALC.SUM OF ELEC: 297 MOSM/KG (ref 275–295)
P AXIS: 59 DEGREES
P-R INTERVAL: 204 MS
PLATELET # BLD AUTO: 219 10(3)UL (ref 150–450)
POTASSIUM SERPL-SCNC: 4.1 MMOL/L (ref 3.5–5.1)
PROT SERPL-MCNC: 7.5 G/DL (ref 5.7–8.2)
Q-T INTERVAL: 368 MS
QRS DURATION: 86 MS
QTC CALCULATION (BEZET): 416 MS
R AXIS: 20 DEGREES
RBC # BLD AUTO: 4.66 X10(6)UL
SODIUM SERPL-SCNC: 142 MMOL/L (ref 136–145)
T AXIS: 53 DEGREES
VENTRICULAR RATE: 77 BPM
WBC # BLD AUTO: 5.4 X10(3) UL (ref 4–11)

## 2024-02-22 PROCEDURE — 99214 OFFICE O/P EST MOD 30 MIN: CPT | Performed by: NURSE PRACTITIONER

## 2024-02-22 PROCEDURE — 80053 COMPREHEN METABOLIC PANEL: CPT

## 2024-02-22 PROCEDURE — 93010 ELECTROCARDIOGRAM REPORT: CPT | Performed by: INTERNAL MEDICINE

## 2024-02-22 PROCEDURE — 85025 COMPLETE CBC W/AUTO DIFF WBC: CPT

## 2024-02-22 PROCEDURE — 36415 COLL VENOUS BLD VENIPUNCTURE: CPT

## 2024-02-22 PROCEDURE — 93005 ELECTROCARDIOGRAM TRACING: CPT

## 2024-02-22 NOTE — PROGRESS NOTES
Edis Olsen is a 73 year old male.  Chief Complaint   Patient presents with    Dizziness     Balance issues     HPI:   He presents with dizziness. His symptoms started about one month ago.     He does not have dizziness with sitting. The dizziness starts when he is walking. The dizziness gets better with rest and sitting.     When walking he feels someone is pushing him from behind. He feels the sensation to lean forward.   When he bends down to pick something up he feels dizzy.  Yesterday he attempted to clip his toenails.  He bent down to reach his feet and immediately got dizzy.    He did have his eyes check about one month ago. He had a normal eye exam.     He denies any headaches, weakness, chest pain or SOB.     He typically walks every afternoon.  Yesterday he had to come home earlier from his walk due to experiencing dizziness.  He is no longer able to walk as far as he used to due to the dizziness.  His wife also noticed that he has a shuffling gait.    Current Outpatient Medications   Medication Sig Dispense Refill    clobetasol 0.05 % External Ointment Use bid to psoriasis 60 g 5    Hydrocortisone 2.5 % External Lotion Use every day prn psoriasis on face 120 mL 3    atorvastatin 10 MG Oral Tab Take 1 tablet (10 mg total) by mouth nightly. 90 tablet 1    latanoprost 0.005 % Ophthalmic Solution INSTILL 1 DROP INTO BOTH EYES AT BEDTIME AS DIRECTED  1      Past Medical History:   Diagnosis Date    High cholesterol     Hyperlipidemia 01/01/2005    life style changes    Plantar fasciitis 01/01/2012    steroid injection x2    Psoriasis     SCC (squamous cell carcinoma) 2022      Past Surgical History:   Procedure Laterality Date    COLONOSCOPY  1999    COLONOSCOPY N/A 1/30/2023    Procedure: COLONOSCOPY;  Surgeon: Franki Lucero MD;  [facility]: [facility]    OTHER SURGICAL HISTORY  2013    Lt thumb 4 sutures      Social History:  Social History     Socioeconomic History    Marital status:     Tobacco Use    Smoking status: Former    Smokeless tobacco: Never   Vaping Use    Vaping Use: Never used   Substance and Sexual Activity    Alcohol use: Yes     Comment: occ, wine    Drug use: No    Sexual activity: Not Currently     Partners: Female   Other Topics Concern    Caffeine Concern Yes     Comment: soda, occ    Stress Concern No    Weight Concern No    Exercise No    Seat Belt Yes    Pt has a pacemaker No    Pt has a defibrillator No    Reaction to local anesthetic No      Family History   Problem Relation Age of Onset    Kidney Disease Father         kidney failure (cause of death)    Other (Other) Mother         natural causes (cause of death)      No Known Allergies     REVIEW OF SYSTEMS:     Review of Systems   Constitutional:  Positive for fatigue.   HENT: Negative.     Respiratory:  Negative for cough, shortness of breath and wheezing.    Cardiovascular:  Negative for chest pain and palpitations.   Gastrointestinal:  Negative for abdominal pain.   Genitourinary: Negative.    Musculoskeletal: Negative.    Skin: Negative.    Neurological:  Negative for headaches.   Psychiatric/Behavioral: Negative.        Wt Readings from Last 5 Encounters:   02/22/24 182 lb (82.6 kg)   08/14/23 175 lb (79.4 kg)   07/25/23 175 lb (79.4 kg)   07/21/23 175 lb 3.2 oz (79.5 kg)   01/30/23 170 lb (77.1 kg)     Body mass index is 26.11 kg/m².      EXAM:   /86   Pulse 97   Resp 16   Ht 5' 10\" (1.778 m)   Wt 182 lb (82.6 kg)   BMI 26.11 kg/m²     Physical Exam  Vitals reviewed.   Constitutional:       Appearance: Normal appearance.   HENT:      Head: Normocephalic.      Right Ear: Tympanic membrane normal.      Left Ear: Tympanic membrane normal.      Nose: No congestion.   Eyes:      Extraocular Movements: Extraocular movements intact.      Pupils: Pupils are equal, round, and reactive to light.   Cardiovascular:      Rate and Rhythm: Normal rate and regular rhythm.      Pulses: Normal pulses.   Pulmonary:       Breath sounds: Normal breath sounds. No wheezing.   Musculoskeletal:         General: No swelling. Normal range of motion.      Cervical back: Normal range of motion and neck supple.   Skin:     General: Skin is warm and dry.   Neurological:      Mental Status: He is alert and oriented to person, place, and time.      Cranial Nerves: No dysarthria or facial asymmetry.      Motor: No weakness or pronator drift.      Coordination: Finger-Nose-Finger Test normal.      Gait: Gait normal.   Psychiatric:         Mood and Affect: Mood normal.         Behavior: Behavior normal.            ASSESSMENT AND PLAN:   1. Dizziness  - neuro exam completed in office no neuro deficits noted.   - will check lab work and EKG  - Comp Metabolic Panel (14); Future  - EKG 12 Lead to be performed at [facility]; Future  - CBC With Differential With Platelet; Future    Total time spent with patient: 30 minutes       The patient indicates understanding of these issues and agrees to the plan.  Return for if symptoms do not resolve.

## 2024-02-26 ENCOUNTER — HOSPITAL ENCOUNTER (OUTPATIENT)
Dept: CT IMAGING | Age: 74
Discharge: HOME | End: 2024-02-26
Attending: NURSE PRACTITIONER
Payer: MEDICARE

## 2024-02-26 DIAGNOSIS — R42 DIZZINESS: ICD-10-CM

## 2024-02-26 PROCEDURE — 70450 CT HEAD/BRAIN W/O DYE: CPT | Performed by: NURSE PRACTITIONER

## 2024-02-28 ENCOUNTER — OFFICE VISIT (OUTPATIENT)
Dept: INTERNAL MEDICINE CLINIC | Facility: CLINIC | Age: 74
End: 2024-02-28
Payer: MEDICARE

## 2024-02-28 VITALS
WEIGHT: 186 LBS | HEIGHT: 70 IN | DIASTOLIC BLOOD PRESSURE: 86 MMHG | BODY MASS INDEX: 26.63 KG/M2 | HEART RATE: 96 BPM | RESPIRATION RATE: 20 BRPM | TEMPERATURE: 98 F | SYSTOLIC BLOOD PRESSURE: 142 MMHG

## 2024-02-28 DIAGNOSIS — I67.2 INTRACRANIAL ATHEROSCLEROSIS: ICD-10-CM

## 2024-02-28 DIAGNOSIS — R90.89 ABNORMAL CT OF BRAIN: ICD-10-CM

## 2024-02-28 DIAGNOSIS — E78.5 HYPERLIPIDEMIA, UNSPECIFIED HYPERLIPIDEMIA TYPE: ICD-10-CM

## 2024-02-28 DIAGNOSIS — R42 DIZZINESS: Primary | ICD-10-CM

## 2024-02-28 PROCEDURE — 99213 OFFICE O/P EST LOW 20 MIN: CPT | Performed by: INTERNAL MEDICINE

## 2024-02-28 NOTE — PROGRESS NOTES
HPI:    Patient ID: Edis Olsen is a 73 year old male.    HPI  Patient is here to discuss recent CAT scan and symptoms.  I last saw him July 21 of last year.  Doing reasonly well at that time.  He recently saw the nurse practitioner with complaints of dizziness.  He has issues with his balance.  It is hard for him to walk after a while.  He gets dizziness to lean forward.  No vertigo.  No syncope or near syncope.  Generally occurs after he is walked a couple of blocks.  Overall things have not felt right over the past 2 months.  There was no precipitating trauma, stress, depression, anxiety.  No change in speaking or swallowing.  No numbness or tingling or weakness.  No change in medications.  Has been on atorvastatin for years.  No problem with eating.  He has been exercising less recently and so has been gaining weight.  No recent falls or stumbles.  Has not really changed much over the past 2 months.  Patient had carotid ultrasound done in November 2022 and both left and right were normal.  Current medications reviewed.    Recent CT showed the following:  CONCLUSION:   1. No acute finding.   2. Advanced changes of chronic small vessel disease in cerebral white matter.   3. Large vessel intracranial atherosclerosis.         Patient Active Problem List   Diagnosis    Plantar fasciitis    Psoriasis and similar disorder    Hyperlipidemia    Screen for colon cancer          HISTORY:  Past Medical History:   Diagnosis Date    High cholesterol     Hyperlipidemia 01/01/2005    life style changes    Plantar fasciitis 01/01/2012    steroid injection x2    Psoriasis     SCC (squamous cell carcinoma) 2022      Past Surgical History:   Procedure Laterality Date    COLONOSCOPY  1999    COLONOSCOPY N/A 1/30/2023    Procedure: COLONOSCOPY;  Surgeon: Franki Lucero MD;  Location: [facility]    OTHER SURGICAL HISTORY  2013    Lt thumb 4 sutures      Family History   Problem Relation Age of Onset    Kidney Disease Father          kidney failure (cause of death)    Other (Other) Mother         natural causes (cause of death)      Social History     Socioeconomic History    Marital status:    Tobacco Use    Smoking status: Former    Smokeless tobacco: Never   Vaping Use    Vaping Use: Never used   Substance and Sexual Activity    Alcohol use: Yes     Comment: occ, wine    Drug use: No    Sexual activity: Not Currently     Partners: Female   Other Topics Concern    Caffeine Concern Yes     Comment: soda, occ    Stress Concern No    Weight Concern No    Exercise No    Seat Belt Yes    Pt has a pacemaker No    Pt has a defibrillator No    Reaction to local anesthetic No          Review of Systems          Current Outpatient Medications   Medication Sig Dispense Refill    clobetasol 0.05 % External Ointment Use bid to psoriasis 60 g 5    Hydrocortisone 2.5 % External Lotion Use every day prn psoriasis on face 120 mL 3    atorvastatin 10 MG Oral Tab Take 1 tablet (10 mg total) by mouth nightly. 90 tablet 1    latanoprost 0.005 % Ophthalmic Solution INSTILL 1 DROP INTO BOTH EYES AT BEDTIME AS DIRECTED  1     Allergies:No Known Allergies     PHYSICAL EXAM:   /86 (BP Location: Left arm, Patient Position: Sitting, Cuff Size: large)   Pulse 96   Temp 98 °F (36.7 °C) (Other)   Resp 20   Ht 5' 10\" (1.778 m)   Wt 186 lb (84.4 kg)   BMI 26.69 kg/m²      Physical Exam  Constitutional:       Appearance: Normal appearance. He is well-developed.   HENT:      Nose: Nose normal.      Mouth/Throat:      Pharynx: No oropharyngeal exudate or posterior oropharyngeal erythema.   Eyes:      Conjunctiva/sclera: Conjunctivae normal.   Neck:      Vascular: No carotid bruit.   Cardiovascular:      Rate and Rhythm: Normal rate and regular rhythm.      Pulses: Normal pulses.      Heart sounds: Normal heart sounds. No murmur heard.  Pulmonary:      Effort: Pulmonary effort is normal.      Breath sounds: Normal breath sounds. No wheezing or rales.    Abdominal:      General: Bowel sounds are normal.      Palpations: Abdomen is soft. There is no mass.      Tenderness: There is no abdominal tenderness.   Musculoskeletal:      Right lower leg: No edema.      Left lower leg: No edema.   Lymphadenopathy:      Cervical: No cervical adenopathy.   Skin:     General: Skin is warm and dry.      Findings: No rash.   Neurological:      General: No focal deficit present.      Mental Status: He is alert.   Psychiatric:         Mood and Affect: Mood normal.         Behavior: Behavior normal.         Thought Content: Thought content normal.          Wt Readings from Last 6 Encounters:   02/28/24 186 lb (84.4 kg)   02/22/24 182 lb (82.6 kg)   08/14/23 175 lb (79.4 kg)   07/25/23 175 lb (79.4 kg)   07/21/23 175 lb 3.2 oz (79.5 kg)   01/30/23 170 lb (77.1 kg)             ASSESSMENT/PLAN:   1. Dizziness  Patient with somewhat atypical presentation of dizziness after walking for certain distance.  CT scan shows no acute findings advanced chronic ischemic changes.  Carotid ultrasound done in November 2022 was normal.  We will check B12 and thyroid studies.  We will stop the atorvastatin for now to see if that is the effect.  Refer to neurology.  May consider MRI study depending on the timing of the neurology appointment.  - Neuro Referral - [facility] ([place])  - Vitamin B12; Future  - TSH W Reflex To Free T4; Future    2. Abnormal CT of brain  As above  - Neuro Referral - [facility] ([place])    3. Intracranial atherosclerosis  As above  - Neuro Referral - [facility] ([place])    4. Hyperlipidemia, unspecified hyperlipidemia type  Consider discontinuation of atorvastatin for a couple of weeks to see if it helps the symptoms.  - TSH W Reflex To Free T4; Future         Meds This Visit:  Requested Prescriptions      No prescriptions requested or ordered in this encounter       Imaging & Referrals:  None         Patric Hernandez MD

## 2024-02-29 ENCOUNTER — LAB ENCOUNTER (OUTPATIENT)
Dept: LAB | Age: 74
End: 2024-02-29
Attending: INTERNAL MEDICINE
Payer: MEDICARE

## 2024-02-29 DIAGNOSIS — E78.5 HYPERLIPIDEMIA, UNSPECIFIED HYPERLIPIDEMIA TYPE: ICD-10-CM

## 2024-02-29 DIAGNOSIS — R42 DIZZINESS: ICD-10-CM

## 2024-02-29 LAB
TSI SER-ACNC: 2.69 MIU/ML (ref 0.55–4.78)
VIT B12 SERPL-MCNC: 460 PG/ML (ref 211–911)

## 2024-02-29 PROCEDURE — 84443 ASSAY THYROID STIM HORMONE: CPT

## 2024-02-29 PROCEDURE — 82607 VITAMIN B-12: CPT

## 2024-02-29 PROCEDURE — 36415 COLL VENOUS BLD VENIPUNCTURE: CPT

## 2024-03-02 ENCOUNTER — PATIENT MESSAGE (OUTPATIENT)
Dept: INTERNAL MEDICINE CLINIC | Facility: CLINIC | Age: 74
End: 2024-03-02

## 2024-03-02 DIAGNOSIS — I67.2 INTRACRANIAL ATHEROSCLEROSIS: ICD-10-CM

## 2024-03-02 DIAGNOSIS — R42 DIZZINESS: ICD-10-CM

## 2024-03-02 DIAGNOSIS — R90.89 ABNORMAL CT OF BRAIN: Primary | ICD-10-CM

## 2024-03-02 NOTE — TELEPHONE ENCOUNTER
From: Patric Hernandez  To: Edis Olsen  Sent: 3/2/2024 9:18 AM CST  Subject: Possible MRI of brain    I was reviewing your chart and noticed that you have an appointment with the neurologist in June. That is quite a ways away. Given the delay in seeing neurology, I think we should proceed with the MRI study of the brain to see if anything else is abnormal. Please send a message back if you are okay with this plan and I will generate the order for the MRI. Depending on what the MRI shows, we may want to get you in quicker with either the same neurology group or a different group.  Patric Hernandez MD

## 2024-03-05 ENCOUNTER — APPOINTMENT (OUTPATIENT)
Dept: GENERAL RADIOLOGY | Facility: HOSPITAL | Age: 74
End: 2024-03-05
Attending: EMERGENCY MEDICINE
Payer: MEDICARE

## 2024-03-05 ENCOUNTER — HOSPITAL ENCOUNTER (EMERGENCY)
Facility: HOSPITAL | Age: 74
Discharge: HOME | End: 2024-03-05
Attending: EMERGENCY MEDICINE
Payer: MEDICARE

## 2024-03-05 ENCOUNTER — APPOINTMENT (OUTPATIENT)
Dept: CT IMAGING | Facility: HOSPITAL | Age: 74
End: 2024-03-05
Attending: EMERGENCY MEDICINE
Payer: MEDICARE

## 2024-03-05 VITALS
RESPIRATION RATE: 21 BRPM | HEIGHT: 70 IN | DIASTOLIC BLOOD PRESSURE: 100 MMHG | OXYGEN SATURATION: 94 % | TEMPERATURE: 101 F | WEIGHT: 186 LBS | BODY MASS INDEX: 26.63 KG/M2 | SYSTOLIC BLOOD PRESSURE: 144 MMHG | HEART RATE: 111 BPM

## 2024-03-05 DIAGNOSIS — U07.1 COVID-19: Primary | ICD-10-CM

## 2024-03-05 LAB
ALBUMIN SERPL-MCNC: 4.8 G/DL (ref 3.2–4.8)
ALBUMIN/GLOB SERPL: 1.5 {RATIO} (ref 1–2)
ALP LIVER SERPL-CCNC: 80 U/L
ALT SERPL-CCNC: 37 U/L
ANION GAP SERPL CALC-SCNC: 6 MMOL/L (ref 0–18)
AST SERPL-CCNC: 35 U/L (ref ?–34)
BASOPHILS # BLD AUTO: 0.01 X10(3) UL (ref 0–0.2)
BASOPHILS NFR BLD AUTO: 0.2 %
BILIRUB SERPL-MCNC: 0.6 MG/DL (ref 0.2–1.1)
BILIRUB UR QL: NEGATIVE
BUN BLD-MCNC: 16 MG/DL (ref 9–23)
BUN/CREAT SERPL: 13.7 (ref 10–20)
CALCIUM BLD-MCNC: 10 MG/DL (ref 8.7–10.4)
CHLORIDE SERPL-SCNC: 107 MMOL/L (ref 98–112)
CLARITY UR: CLEAR
CO2 SERPL-SCNC: 26 MMOL/L (ref 21–32)
COLOR UR: COLORLESS
CREAT BLD-MCNC: 1.17 MG/DL
DEPRECATED RDW RBC AUTO: 38.5 FL (ref 35.1–46.3)
EGFRCR SERPLBLD CKD-EPI 2021: 65 ML/MIN/1.73M2 (ref 60–?)
EOSINOPHIL # BLD AUTO: 0.02 X10(3) UL (ref 0–0.7)
EOSINOPHIL NFR BLD AUTO: 0.4 %
ERYTHROCYTE [DISTWIDTH] IN BLOOD BY AUTOMATED COUNT: 11.9 % (ref 11–15)
FLUAV + FLUBV RNA SPEC NAA+PROBE: NEGATIVE
FLUAV + FLUBV RNA SPEC NAA+PROBE: NEGATIVE
GLOBULIN PLAS-MCNC: 3.3 G/DL (ref 2.8–4.4)
GLUCOSE BLD-MCNC: 105 MG/DL (ref 70–99)
GLUCOSE BLDC GLUCOMTR-MCNC: 100 MG/DL (ref 70–99)
GLUCOSE UR-MCNC: NORMAL MG/DL
HCT VFR BLD AUTO: 41.5 %
HGB BLD-MCNC: 14.8 G/DL
HGB UR QL STRIP.AUTO: NEGATIVE
IMM GRANULOCYTES # BLD AUTO: 0.02 X10(3) UL (ref 0–1)
IMM GRANULOCYTES NFR BLD: 0.4 %
INR BLD: 0.94 (ref 0.8–1.2)
KETONES UR-MCNC: NEGATIVE MG/DL
LACTATE SERPL-SCNC: 1.9 MMOL/L (ref 0.5–2)
LEUKOCYTE ESTERASE UR QL STRIP.AUTO: NEGATIVE
LYMPHOCYTES # BLD AUTO: 0.6 X10(3) UL (ref 1–4)
LYMPHOCYTES NFR BLD AUTO: 11 %
MCH RBC QN AUTO: 32 PG (ref 26–34)
MCHC RBC AUTO-ENTMCNC: 35.7 G/DL (ref 31–37)
MCV RBC AUTO: 89.6 FL
MONOCYTES # BLD AUTO: 0.65 X10(3) UL (ref 0.1–1)
MONOCYTES NFR BLD AUTO: 11.9 %
NEUTROPHILS # BLD AUTO: 4.14 X10 (3) UL (ref 1.5–7.7)
NEUTROPHILS # BLD AUTO: 4.14 X10(3) UL (ref 1.5–7.7)
NEUTROPHILS NFR BLD AUTO: 76.1 %
NITRITE UR QL STRIP.AUTO: NEGATIVE
OSMOLALITY SERPL CALC.SUM OF ELEC: 290 MOSM/KG (ref 275–295)
PH UR: 7.5 [PH] (ref 5–8)
PLATELET # BLD AUTO: 186 10(3)UL (ref 150–450)
POTASSIUM SERPL-SCNC: 4.1 MMOL/L (ref 3.5–5.1)
PROCALCITONIN SERPL-MCNC: 0.07 NG/ML (ref ?–0.05)
PROT SERPL-MCNC: 8.1 G/DL (ref 5.7–8.2)
PROT UR-MCNC: NEGATIVE MG/DL
PROTHROMBIN TIME: 13.1 SECONDS (ref 11.6–14.8)
RBC # BLD AUTO: 4.63 X10(6)UL
RSV RNA SPEC NAA+PROBE: NEGATIVE
SARS-COV-2 RNA RESP QL NAA+PROBE: DETECTED
SODIUM SERPL-SCNC: 139 MMOL/L (ref 136–145)
SP GR UR STRIP: 1.01 (ref 1–1.03)
UROBILINOGEN UR STRIP-ACNC: NORMAL
WBC # BLD AUTO: 5.4 X10(3) UL (ref 4–11)

## 2024-03-05 PROCEDURE — 71045 X-RAY EXAM CHEST 1 VIEW: CPT | Performed by: EMERGENCY MEDICINE

## 2024-03-05 PROCEDURE — 84145 PROCALCITONIN (PCT): CPT | Performed by: EMERGENCY MEDICINE

## 2024-03-05 PROCEDURE — 85025 COMPLETE CBC W/AUTO DIFF WBC: CPT

## 2024-03-05 PROCEDURE — 96360 HYDRATION IV INFUSION INIT: CPT

## 2024-03-05 PROCEDURE — 0241U SARS-COV-2/FLU A AND B/RSV BY PCR (GENEXPERT): CPT | Performed by: EMERGENCY MEDICINE

## 2024-03-05 PROCEDURE — 85610 PROTHROMBIN TIME: CPT | Performed by: EMERGENCY MEDICINE

## 2024-03-05 PROCEDURE — 87077 CULTURE AEROBIC IDENTIFY: CPT | Performed by: EMERGENCY MEDICINE

## 2024-03-05 PROCEDURE — 85025 COMPLETE CBC W/AUTO DIFF WBC: CPT | Performed by: EMERGENCY MEDICINE

## 2024-03-05 PROCEDURE — 99284 EMERGENCY DEPT VISIT MOD MDM: CPT

## 2024-03-05 PROCEDURE — 83605 ASSAY OF LACTIC ACID: CPT | Performed by: EMERGENCY MEDICINE

## 2024-03-05 PROCEDURE — 87040 BLOOD CULTURE FOR BACTERIA: CPT | Performed by: EMERGENCY MEDICINE

## 2024-03-05 PROCEDURE — 87150 DNA/RNA AMPLIFIED PROBE: CPT | Performed by: EMERGENCY MEDICINE

## 2024-03-05 PROCEDURE — 36415 COLL VENOUS BLD VENIPUNCTURE: CPT

## 2024-03-05 PROCEDURE — 82962 GLUCOSE BLOOD TEST: CPT

## 2024-03-05 PROCEDURE — 80053 COMPREHEN METABOLIC PANEL: CPT | Performed by: EMERGENCY MEDICINE

## 2024-03-05 PROCEDURE — 70450 CT HEAD/BRAIN W/O DYE: CPT | Performed by: EMERGENCY MEDICINE

## 2024-03-05 PROCEDURE — 81003 URINALYSIS AUTO W/O SCOPE: CPT | Performed by: EMERGENCY MEDICINE

## 2024-03-05 PROCEDURE — 80053 COMPREHEN METABOLIC PANEL: CPT

## 2024-03-05 RX ORDER — ACETAMINOPHEN 500 MG
1000 TABLET ORAL ONCE
Status: COMPLETED | OUTPATIENT
Start: 2024-03-05 | End: 2024-03-05

## 2024-03-06 NOTE — TELEPHONE ENCOUNTER
Please contact the neurology office as well as scheduling for MRI.  Patient is worsening.  I would like to see him get both the MRI and the follow-up with the neurologist sooner than currently scheduled.

## 2024-03-06 NOTE — ED QUICK NOTES
Pt a/ox4, respirations unlabored, speech full/clear,, no acute distress. All ED orders completed.   Pt instructed to return to ED for any new/severe s/s or as directed by provider, and to see PMD/specialist ASAP or as directed by provider.

## 2024-03-06 NOTE — CONSULTS
[facility]  [address] [place] [address], [address]  [facility] [address]  [phone]          STROKE  CONSULTATION NOTE  [facility]  part of [facility]    Report of Consultation    Edis Olsen Patient Status:  Emergency     3/14/1950 MRN [id number]    Location [facility] EMERGENCY DEPARTMENT Attending No att. providers found    Hosp Day # 0 PCP Patric Hernandez MD      Date of Admission:  (Not on file)  Date of Consult:  3/5/2024  Reason for Admission/Consultation: ***    Requested by: No att. providers found  Name of Outside Hospital if Transferred: N/A  Time of patient arrival:  6:43 PM   on 3/5/24   Time of initial page: *** on 24         Time of encounter:*** on 24  Time when imaging was reviewed by radiology: *** on 3/5/2024  Time when tPA preparation was recommended to the primary team: {n/a:[id number]::\"***\"}on 3/5/2024  Time when final decision to offer tPA was made: {n/a:399463019::\"***\"}on 3/5/2024  Time when the endovascular team was notified of the case: {n/a:852678972::\"***\"} 24  Time when decision to transfer the patient was made: {n/a:408609333::\"***\"} 24       _________________________________________________________________________________________    Chief Complaint:   Chief Complaint   Patient presents with    Dizziness       HPI:  Edis Olsen is a 73 year old {Righthanded/lefthanded:81316::\"***\"} male w/ a pmhx sig. for  {Relevant PMHx cognitive:9708},***,and***,  who ***        Prior stroke/TIAs (date, description):       Date  Topography/symptoms  Etiology      Vascular Risk Factors:   {Vascular risk factors:9754}    Prior to admission, taking antithrombotic: {YES/NO:5531}   Agent: ***  Prior to admission, taking statin: {YES/NO:5531}   Agent: Cholesterol Meds: atorvastatin Tabs - 10 MG      Prior to admission, taking antihypertensive: {YES/NO:5531}   Agent (s):      Review and summation of prior  records        ROS:  Pertinent positive and negatives per HPI.  All others were reviewed and negative.    Past Medical History:   Diagnosis Date    High cholesterol     Hyperlipidemia 01/01/2005    life style changes    Plantar fasciitis 01/01/2012    steroid injection x2    Psoriasis     SCC (squamous cell carcinoma) 2022       Past Surgical History:   Procedure Laterality Date    COLONOSCOPY  1999    COLONOSCOPY N/A 1/30/2023    Procedure: COLONOSCOPY;  Surgeon: Franki Lucero MD;  Location: [facility]    OTHER SURGICAL HISTORY  2013    Lt thumb 4 sutures       Family History   Problem Relation Age of Onset    Kidney Disease Father         kidney failure (cause of death)    Other (Other) Mother         natural causes (cause of death)       Social History     Socioeconomic History    Marital status:      Spouse name: Not on file    Number of children: Not on file    Years of education: Not on file    Highest education level: Not on file   Occupational History    Not on file   Tobacco Use    Smoking status: Former    Smokeless tobacco: Never   Vaping Use    Vaping Use: Never used   Substance and Sexual Activity    Alcohol use: Yes     Comment: occ, wine    Drug use: No    Sexual activity: Not Currently     Partners: Female   Other Topics Concern     Service Not Asked    Blood Transfusions Not Asked    Caffeine Concern Yes     Comment: soda, occ    Occupational Exposure Not Asked    Hobby Hazards Not Asked    Sleep Concern Not Asked    Stress Concern No    Weight Concern No    Special Diet Not Asked    Back Care Not Asked    Exercise No    Bike Helmet Not Asked    Seat Belt Yes    Self-Exams Not Asked    Grew up on a farm Not Asked    History of tanning Not Asked    Outdoor occupation Not Asked    Pt has a pacemaker No    Pt has a defibrillator No    Reaction to local anesthetic No   Social History Narrative    Not on file     Social Determinants of Health     Financial Resource Strain: Not on  file   Food Insecurity: Not on file   Transportation Needs: Not on file   Physical Activity: Not on file   Stress: Not on file   Social Connections: Not on file   Housing Stability: Not on file       Outpatient Medications  Current Outpatient Medications   Medication Instructions    atorvastatin (LIPITOR) 10 mg, Oral, Nightly    clobetasol 0.05 % External Ointment Use bid to psoriasis    Hydrocortisone 2.5 % External Lotion Use every day prn psoriasis on face    latanoprost 0.005 % Ophthalmic Solution INSTILL 1 DROP INTO BOTH EYES AT BEDTIME AS DIRECTED        Inpatient Medications    Current Outpatient Medications:     clobetasol 0.05 % External Ointment, Use bid to psoriasis, Disp: 60 g, Rfl: 5    Hydrocortisone 2.5 % External Lotion, Use every day prn psoriasis on face, Disp: 120 mL, Rfl: 3    atorvastatin 10 MG Oral Tab, Take 1 tablet (10 mg total) by mouth nightly., Disp: 90 tablet, Rfl: 1    latanoprost 0.005 % Ophthalmic Solution, INSTILL 1 DROP INTO BOTH EYES AT BEDTIME AS DIRECTED, Disp: , Rfl: 1               Objective:  Last vitals and weight :  Vitals:    03/05/24 1849   BP: (!) 174/111   Pulse: (!) 127   Resp: 20   Temp: (!) 100.7 °F (38.2 °C)       Exam:  - General: {Exam; general:02431::\"appears stated age\",\"no distress\"}  - CV: symmetric pulses   Carotids:   - Pulmonary: no signs of respiratory distress. Normal excursion of the chest.   Neurologic Exam  - Mental Status: Alert and attentive. {orientation:81133}.  Speech is spontaneous, fluent, and prosodic. Comprehension and repetition intact. Phrase length and rate are normal. No paraphasic errors, neologisms, anomia, acalculia, apraxia, anosognosia, or R/L confusion. No neglect.   - Cranial Nerves: No gaze preference. Visual fields:{FINDINGS; EYE EXAM VISUAL FIELD DEFECTS:27581::\"normal\"}  Pupils are ***mm briskly constricting to ***mm and equally round and reactive to light  in a well lit room. EOMI. No nystagmus. No ptosis. V1-V3 intact B/L to  light touch.{trace subtle:25459::\"No pathological facial asymmetry.\"} {nasolabial fold:21197::\"No flattening of the nasolabial fold. \"}.  Hearing grossly intact.  Tongue midline. No atrophy or fasiculations of the tongue noted. Palate and uvula elevate symmetrically.  Shoulder shrug symmetric.  - Fundoscopic exam:{Exam; eye fundus:209::\"normal w/o hemorrhages, exudates, or papilledema\"}.No attenuation. No pallor.  - Motor:  normal tone, normal bulk. No interosseous wasting. No flattening of hypothenar eminences.       Right Left     Motor Strength   Deltoids 5 5  Triceps 5 5  Biceps 5 5  Wrist Extensors 5 5   5 5   Hip Flexors 5 5   Knee extensors 5 5  Knee flexors 5 5  Plantar flexion 5 5  Dorsiflexion 5 5      Pronator drift: {Pronator drift:43877::\"No pronator drift\"}   Index Rolling: No orbiting.   Finger Taps: {finger taps:80990::\"Finger taps are symmetric in rate and amplitude. \"}   Rapid movements: {rapid movements:9369::\"Rapid/fine movements are symmetric. As expected their dominant hand is slightly faster.\"}   Leg Drift: None   Foot Taps: {foot taps:9370::\"Foot taps are symmetric.\"}      Asterixis: No asterixis noted.   Tremor:     Reflexes:    C5 C6 C7  L4 S1   R 2+ 2+  2+ 2+   L 2+ 2+  2+ 2+   Adductor Spread: {adductor spread:9371::\"No adductor spread noted.\"}    Frontal release signs:{frontal release signs:21320::\"Not assessed. \"}   Frank's sign:{Responses; absent/present cva tenderness:707::\"absent\"}   Nonsustained clonus: Absent   Sustained clonus: Absent   - Sensory:   Light touch: normal  Temperature: normal  Vibration: normal  - Cerebellum: No truncal ataxia. No titubations. No dysmetria, no dysdiadochokinesis. No overshoot.   - Gait/station: Normal gait and station. Symmetric arm swing.   - Plantar response: {EXAM; NEURO PED BABINSKI:16229::\"flexor bilaterally\"}    NIH Stroke Scale  Person Administering Scale: Terrell Tolbert DO  1a  Level of consciousness: {NIHLOC:84921}   1b. LOC questions:   {NIHQUESTIONS:90468}   1c. LOC commands: {NIHCOMMANDS:66014}   2.  Best Gaze: {NIHGAZE:12809}   3.  Visual: {NIH vision:46575}   4. Facial Palsy: {NIHfacial:78421}   5a.  Motor left arm: {NIHARM:91401}   5b.  Motor right arm: {NIHARM:07278}   6a. motor left leg: {NIH le}   6b  Motor right leg:  {NIH le}   7. Limb Ataxia: {NIH ataxia:59378}   8.  Sensory: {NIH sensory:64116}   9. Best Language:  {NIH language:52104}   10. Dysarthria: {NIH dysarthria:57511}   11. Extinction and Inattention: {[facility] neglect:68929}    Total:   {0-42:45974}     Modified Bob Score: {Modified [place] scale score:06507}                              Data reviewed    ECG findings by monitor or 12-lead:  Ecg:   Results for orders placed or performed in visit on 24   EKG 12 Lead to be performed at [facility]   Result Value Ref Range    Ventricular rate 77 BPM    Atrial rate 77 BPM    P-R Interval 204 ms    QRS Duration 86 ms    Q-T Interval 368 ms    QTC Calculation (Bezet) 416 ms    P Axis 59 degrees    R Axis 20 degrees    T Axis 53 degrees       Test results/Imaging:   Lab Results   Component Value Date/Time    TRIG 89 2023 08:33 AM    HDL 59 2023 08:33 AM    LDL 73 2023 08:33 AM     No results for input(s): \"WBC\", \"HGB\", \"HCT\", \"PLT\" in the last 168 hours.  No results for input(s): \"NA\", \"K\", \"CL\", \"CO2\", \"BUN\", \"CREATININE\", \"ALKPHOS\", \"ALT\", \"AST\" in the last 168 hours.    Invalid input(s): \"CALCIUM\", \"LABALBU\", \"PROT\", \"BILIT\", \"GLUC\"  No results found for: \"A1C\"  Last A1c value was  % done  .          No valid procedures specified.  No valid procedures specified.  No valid procedures specified.  No valid procedures specified.  No valid procedures specified.  No valid procedures specified.  No valid procedures specified.   CT BRAIN OR HEAD (91525)    Result Date: 2024  CONCLUSION:  1. No acute finding. 2. Advanced changes of chronic small vessel disease in cerebral white matter.  3. Large vessel intracranial atherosclerosis.    Dictated by (CST): Srinivasa Carl MD on 2024 at 7:07 PM     Finalized by (CST): Srinivasa Carl MD on 2024 at 7:11 PM           Performed an independent visualization of: {[facility] neuroimagin}   Imaging revealed: {imaging results (normal):9636::\"Agree with radiology read.\"}        73 year old male w/ a pmhx sig. for *** who ***    ***  Differential Diagnosis:  ***        Reperfusion therapy eligibility: patient is not eligible because: {notpanonmt:65669}  Agent and time of first antithrombotic administration (or contraindication):   {Antiplatelets:54904::\"Aspirin 325 once or rectal aspirin 300 once. Starting tomorrow Aspirin 81 mg daily or rectal aspirin 300 mg daily if still n.p.o.\"}  BP goal:   {BP Goal:94744}  Additional brain and vascular imaging ordered:   {migrainediagnostics:9634}  Cardiac imaging: {[facility] cardiac imagin}   Additional labs ordered:   Labs: {[facility] stroke labs:9267}  Dysphagia status:   Dysphagia{Dysphagia:54526}  Fluids/nutrition:   {ivfstroke (Optional):23079::\"AVOID Hypotonic fluids in patients with acute ischemic stroke or cerebral edema.  Hypotonic fluids can worsen cerebral edema.  This includes D5 W, half-normal saline, and lactated Ringer's.  If patients need glucose then please use normal saline.\"}  DVT prophylaxis:   {PPx:35723:p:\"SCD's while in bed\"}  Therapy/rehab services ordered (speech/PT/OT/rehab consult):   Physical therapy, Occupational Therapy, speech therapy evaluations ordered.   maintain oxygen saturation >94%. No supplemental oxygen  in nonhypoxic patients with acute ischemic stroke (AIS).  Tx hyperthermia (temperature >38°C) and identify source  Evidence indicates that persistent in-hospital hyperglycemia during the first 24 hours after AIS is associated with worse outcomes than normoglycemia and thus, it is reasonable to treat hyperglycemia to achieve blood glucose levels in a range of 140 to 180 mg/dL and to  closely monitor to prevent hypoglycemia in patients with AIS.  Neuro checks Q{MODEL AMB 1-10:1467279353::\"4\"}.  Telemetry.NIH stroke scale per protocol.  Other:        Education/Instructions given to: {Persons; family members:{Persons; family members:386864}   Barriers to Learning:{BARRIERS TO EDUCATION:3997::\"None\"}  Content: Refer to note above.  Also provided education on recurrent stroke signs and symptoms, including but not limited to a facial droop, dysarthria, difficulty talking, word finding difficulties, weakness, falls, change in sensation. Pt should call 911 or be taken to the nearest emergency department if sx occur.  Evaluation/Outcome: {Verbalized understandin::\"Verbalized understanding\"}    This document is not intended to support charting by exception.  Sections left blank in a completed note should be presumed not to have been done.    Level of complexity was based on - interviewing, examining the patient, establishing a plan of care, as well as review of all neuroimaging and cardiovascular studies.    Disclaimer:   This record was dictated using Dragon software. There may be errors due to voice recognition problems that were not realized and corrected during the completion of the note.       Thank you.  Terrell Tolbert DO   Staff Vascular & General Neurology

## 2024-03-06 NOTE — TELEPHONE ENCOUNTER
Neurology currently closed. Tried calling to schedule MRI, but hold time was 10 minutes.   Please follow up tomorrow.

## 2024-03-06 NOTE — DISCHARGE INSTRUCTIONS
Drink plenty of fluids.  Tylenol every 4-6 hours as needed for the next few days.  Return for changes or worsening and read all instructions for further recommendations.

## 2024-03-06 NOTE — ED INITIAL ASSESSMENT (HPI)
Pt in wheel chair through triage c/o dizziness increasing w/ weakness noticec by wife upon arriving home @ 1530. 0800 LKW. Wife states he has been going through tests with PCP regarding this, but nothing as bad as today. L sided facial droop noted.    Stroke alert called 1854

## 2024-03-06 NOTE — ED PROVIDER NOTES
Patient Seen in: [facility] Emergency Department      History     Chief Complaint   Patient presents with    Dizziness     Stated Complaint: weakness/ \"brain foggy\"    Subjective:   73-year-old male with history of hyperlipidemia and glaucoma presents with chief complaints of myalgias this morning and then weakness this evening.  He is under the care of his of his physicians because he has been feeling dizzy for a couple months his wife says.  They describe it as being off balance and some difficulty with walking.  They are scheduled to have an MRI in April.  However today he woke up with some myalgias and arthralgias.  Then later in the evening he was sitting in his chair and he told his wife he could not get up that his legs felt weak.  He is not aware of any chills or fever but he had fever in triage.  No nausea vomiting diarrhea.  He has urinary frequency going on for couple weeks but he does not believe it is cloudy.  No rash or leg swelling.  No slurred speech or confusion.  No focal weakness or numbness.  His wife said eventually she was able to get him up into the car and she drove him here herself but she was considering calling the ambulance because he was having a hard time getting out of the chair.            Objective:   Past Medical History:   Diagnosis Date    High cholesterol     Hyperlipidemia 01/01/2005    life style changes    Plantar fasciitis 01/01/2012    steroid injection x2    Psoriasis     SCC (squamous cell carcinoma) 2022              Past Surgical History:   Procedure Laterality Date    COLONOSCOPY  1999    COLONOSCOPY N/A 1/30/2023    Procedure: COLONOSCOPY;  Surgeon: Franki Lucero MD;  [facility]    OTHER SURGICAL HISTORY  2013    Lt thumb 4 sutures                Social History     Socioeconomic History    Marital status:    Tobacco Use    Smoking status: Former    Smokeless tobacco: Never   Vaping Use    Vaping Use: Never used   Substance and Sexual  Activity    Alcohol use: Yes     Comment: occ, wine    Drug use: No    Sexual activity: Not Currently     Partners: Female   Other Topics Concern    Caffeine Concern Yes     Comment: soda, occ    Stress Concern No    Weight Concern No    Exercise No    Seat Belt Yes    Pt has a pacemaker No    Pt has a defibrillator No    Reaction to local anesthetic No              Review of Systems    Positive for stated complaint: weakness/ \"brain foggy\"  Other systems are as noted in HPI.  Constitutional and vital signs reviewed.      All other systems reviewed and negative except as noted above.    Physical Exam     ED Triage Vitals [03/05/24 1849]   BP (!) 174/111   Pulse (!) 127   Resp 20   Temp (!) 100.7 °F (38.2 °C)   Temp src Oral   SpO2 96 %   O2 Device None (Room air)       Current:BP (!) 144/100   Pulse 108   Temp (!) 100.7 °F (38.2 °C) (Oral)   Resp 19   Ht 177.8 cm (5' 10\")   Wt 84.4 kg   SpO2 94%   BMI 26.69 kg/m²         Physical Exam  HENT:      Head: Normocephalic and atraumatic.      Right Ear: External ear normal.      Left Ear: External ear normal.      Nose: Nose normal.      Mouth/Throat:      Mouth: Mucous membranes are moist.   Eyes:      Extraocular Movements: Extraocular movements intact.      Pupils: Pupils are equal, round, and reactive to light.   Cardiovascular:      Rate and Rhythm: Regular rhythm. Tachycardia present.      Pulses: Normal pulses.   Pulmonary:      Effort: Pulmonary effort is normal.      Breath sounds: Normal breath sounds.   Abdominal:      Palpations: Abdomen is soft.      Tenderness: There is no abdominal tenderness.   Musculoskeletal:         General: No swelling. Normal range of motion.      Cervical back: Normal range of motion and neck supple.   Lymphadenopathy:      Cervical: No cervical adenopathy.   Skin:     General: Skin is warm.      Capillary Refill: Capillary refill takes less than 2 seconds.   Neurological:      General: No focal deficit present.      Mental  Status: He is alert.      Sensory: No sensory deficit.      Motor: No weakness.               ED Course     Labs Reviewed   COMP METABOLIC PANEL (14) - Abnormal; Notable for the following components:       Result Value    Glucose 105 (*)     AST 35 (*)     All other components within normal limits   PROCALCITONIN - Abnormal; Notable for the following components:    Procalcitonin 0.07 (*)     All other components within normal limits   URINALYSIS WITH CULTURE REFLEX - Abnormal; Notable for the following components:    Urine Color Colorless (*)     All other components within normal limits   POCT GLUCOSE - Abnormal; Notable for the following components:    POC Glucose  100 (*)     All other components within normal limits   SARS-COV-2/FLU A AND B/RSV BY PCR (GENEXPERT) - Abnormal; Notable for the following components:    SARS-CoV-2 (COVID-19) - (GeneXpert) Detected (*)     All other components within normal limits    Narrative:     This test is intended for the qualitative detection and differentiation of SARS-CoV-2, influenza A, influenza B, and respiratory syncytial virus (RSV) viral RNA in nasopharyngeal or nares swabs from individuals suspected of respiratory viral infection consistent with COVID-19 by their healthcare provider. Signs and symptoms of respiratory viral infection due to SARS-CoV-2, influenza, and RSV can be similar.    Test performed using the Xpert Xpress SARS-CoV-2/FLU/RSV (real time RT-PCR)  assay on the [employer] instrument, [employer], [place] [address].   This test is being used under the Food and Drug Administration's Emergency Use Authorization.    The authorized Fact Sheet for Healthcare Providers for this assay is available upon request from the laboratory.   CBC W/ DIFFERENTIAL - Abnormal; Notable for the following components:    Lymphocyte Absolute 0.60 (*)     All other components within normal limits   LACTIC ACID, PLASMA - Normal   PROTHROMBIN TIME (PT) - Normal   CBC WITH DIFFERENTIAL  WITH PLATELET    Narrative:     The following orders were created for panel order CBC With Differential With Platelet.  Procedure                               Abnormality         Status                     ---------                               -----------         ------                     CBC W/ DIFFERENTIAL[[id number]]          Abnormal            Final result                 Please view results for these tests on the individual orders.   RAINBOW DRAW BLUE   BLOOD CULTURE   BLOOD CULTURE          ED Course as of 03/05/24 2220  ------------------------------------------------------------  Time: 03/05 2200  Comment: Labs and imaging all independently interpreted by me.  Patient has COVID.  CBC normal, CMP essentially normal with a slight AST elevation.  PT normal.  Lactate normal, urine was collected.  Heart rate has improved.  Pulse ox is ranged from 94 to 98%.  Patient is adamant about going home.  I will have the tech help him stand to see if he can make at home.  ------------------------------------------------------------  Time: 03/05 2203  Comment: Patient ambulated.  Really wants to go home.  ------------------------------------------------------------  Time: 03/05 2219  Comment: Urine independently interpreted by me is unremarkable              MDM      CT BRAIN OR HEAD ([id number])    Result Date: 3/5/2024  CONCLUSION:  Severe chronic microvascular ischemic disease without acute intracranial abnormality.  Dictated by (CST): Matthew Viramontes MD on 3/05/2024 at 9:12 PM     Finalized by (CST): Matthew Viramontes MD on 3/05/2024 at 9:15 PM          XR CHEST AP PORTABLE  (CPT=71045)    Result Date: 3/5/2024  CONCLUSION: No acute cardiopulmonary abnormality.    Dictated by (CST): Matthew Viramontes MD on 3/05/2024 at 8:37 PM     Finalized by (CST): Matthew Viramontes MD on 3/05/2024 at 8:38 PM                                            Medical Decision Making  Patient with myalgias, weakness, fever, slight headache earlier.  Differential  is vast but patient could have viral syndrome, pneumonia, bacteremia, sepsis, UTI, cellulitis, intra-abdominal pathology.  Septic workup in progress.  Patient will get Tylenol and fluids.  Looks well at this time.  Has no neurologic deficit I do not believe this is a stroke.  The dizziness is chronic but I will get a CT head here.  Pulse ox normal 97% on room air.  Patient has sinus tachycardia on the monitor.    Amount and/or Complexity of Data Reviewed  External Data Reviewed: labs and notes.  Labs: ordered. Decision-making details documented in ED Course.  Radiology: ordered and independent interpretation performed.    Risk  OTC drugs.        Disposition and Plan     Clinical Impression:  1. COVID-19         Disposition:  Discharge  3/5/2024 10:06 pm    Follow-up:  Patric Hernandez MD  [address] [place] [id number]  [phone]    Follow up in 2 day(s)      We recommend that you schedule follow up care with a primary care provider within the next three months to obtain basic health screening including reassessment of your blood pressure.      Medications Prescribed:  Current Discharge Medication List

## 2024-03-07 ENCOUNTER — HOSPITAL ENCOUNTER (OUTPATIENT)
Dept: MRI IMAGING | Facility: HOSPITAL | Age: 74
Discharge: HOME | End: 2024-03-07
Attending: INTERNAL MEDICINE
Payer: MEDICARE

## 2024-03-07 ENCOUNTER — TELEPHONE (OUTPATIENT)
Dept: INTERNAL MEDICINE CLINIC | Facility: CLINIC | Age: 74
End: 2024-03-07

## 2024-03-07 ENCOUNTER — TELEPHONE (OUTPATIENT)
Dept: NEUROLOGY | Facility: CLINIC | Age: 74
End: 2024-03-07

## 2024-03-07 DIAGNOSIS — R90.89 ABNORMAL CT OF BRAIN: ICD-10-CM

## 2024-03-07 DIAGNOSIS — I67.2 INTRACRANIAL ATHEROSCLEROSIS: ICD-10-CM

## 2024-03-07 DIAGNOSIS — R42 DIZZINESS: ICD-10-CM

## 2024-03-07 PROCEDURE — 70553 MRI BRAIN STEM W/O & W/DYE: CPT | Performed by: INTERNAL MEDICINE

## 2024-03-07 PROCEDURE — A9575 INJ GADOTERATE MEGLUMI 0.1ML: HCPCS | Performed by: INTERNAL MEDICINE

## 2024-03-07 RX ORDER — GADOTERATE MEGLUMINE 376.9 MG/ML
20 INJECTION INTRAVENOUS
Status: COMPLETED | OUTPATIENT
Start: 2024-03-07 | End: 2024-03-07

## 2024-03-07 RX ADMIN — GADOTERATE MEGLUMINE 17 ML: 376.9 INJECTION INTRAVENOUS at 19:48:00

## 2024-03-07 NOTE — TELEPHONE ENCOUNTER
Does this include the neurologists in the [place] and [place] offices?  Can we find out from their offices whether there are any openings sooner?  Then we could contact the patient and see if he is willing to drive to go there.

## 2024-03-07 NOTE — TELEPHONE ENCOUNTER
Dr Hernandez=see 3/7/24 neurology telephone encounter ====neurologist can see the patient on 3/8/24 at 1030 am .They will contact the patient to confirm the appointment .     [employer] message sent with instruction to contact the neurology office today for the appointment .       Future Appointments   Date Time Provider Department Center   3/7/2024  7:15 PM [facility] MRI RM1 (1.5T WIDE) [facility] MRI EM [place]   6/19/2024 10:00 AM Terrell Tolbert DO [facility] [place] [facility]

## 2024-03-07 NOTE — TELEPHONE ENCOUNTER
Dr Hernandez=see below, BK.     RN=please follow up  the neurology apppontment            RN called neurology department and spoke with Carol, informed about Dr Hernandez's request for the early appointment to any neurology specialist. States that all NEUROLOGISTS  are fully booked until June 2024.  But she will send a message to the Clinical staff to check the schedule and they will call the patient .             Patient is now scheduled for the MRI this evening at 715 pm from April's apppontment .     Future Appointments   Date Time Provider Department Center   3/7/2024  7:15 PM [facility] MRI RM1 (1.5T WIDE) [facility] MRI EM [place]   6/19/2024 10:00 AM Terrell Tolbert DO [facility] [place] [facility]     Appointment Information   Name: Cristian Olsen MRN: [id number]   Date: 4/11/2024 Status: Can   Appt Time: 11:15 AM Length: 60   Visit Type: [facility] MRI BRAIN WW [1425] Copay: $0.00   Provider: [facility] MRI RM1 (1.5T WIDE) Department: [facility] MRI   Referral Number: [id number] Referral Status: Authorized   Enc Form Number: [id number]           Rescheduled To: Thu Mar 7, 2024 7:15 PM   Made On:  Canceled: 3/4/2024 9:28 AM  3/7/2024 8:43 AM By:  By: SHREYAS LOUIS [[id number]] (ES)  GENERIC, MYCHART [MYCHARTG] (FP)   Cancel Rsn: Scheduled from Waitlist         COPIED AND PASTE 3/2/24 MyChart encounter ;    Carmen Hernandez, RN Registered Nurse SignedYesterday     Copy     Neurology currently closed. Tried calling to schedule MRI, but hold time was 10 minutes.   Please follow up tomorrow.            Patric Hernandez MD Physician SignedYesterday     Copy     Please contact the neurology office as well as scheduling for MRI.  Patient is worsening.  I would like to see him get both the MRI and the follow-up with the neurologist sooner than currently scheduled.           Patric Hernandez MD Physician Signed3/2/2024     Copy     From: Patric Hernandez  To: Edis Olsen  Sent: 3/2/2024  9:18 AM CST  Subject: Possible MRI of brain    I was reviewing your chart and  noticed that you have an appointment with the neurologist in June.  That is quite a ways away.  Given the delay in seeing neurology, I think we should proceed with the MRI study of the brain to see if anything else is abnormal.  Please send a message back if you are okay with this plan and I will generate the order for the MRI.  Depending on what the MRI shows, we may want to get you in quicker with either the same neurology group or a different group.  Patric Hernandez MD

## 2024-03-07 NOTE — TELEPHONE ENCOUNTER
Spoke to patient, verified Name and . States that somebody from Neurology office called him back and that he is aware of the appointment scheduled for tomorrow. No further questions or concerns at this time.     Future Appointments   Date Time Provider Department Center   3/7/2024  7:15 PM [facility] MRI RM1 (1.5T WIDE) [facility] MRI EM [place]   3/8/2024 10:30 AM Greg Tang MD [facility] [place] [facility]

## 2024-03-08 ENCOUNTER — LAB ENCOUNTER (OUTPATIENT)
Dept: LAB | Facility: HOSPITAL | Age: 74
End: 2024-03-08
Attending: Other
Payer: MEDICARE

## 2024-03-08 ENCOUNTER — OFFICE VISIT (OUTPATIENT)
Dept: NEUROLOGY | Facility: CLINIC | Age: 74
End: 2024-03-08
Payer: MEDICARE

## 2024-03-08 VITALS
BODY MASS INDEX: 26.34 KG/M2 | HEART RATE: 80 BPM | HEIGHT: 70 IN | WEIGHT: 184 LBS | SYSTOLIC BLOOD PRESSURE: 138 MMHG | DIASTOLIC BLOOD PRESSURE: 75 MMHG

## 2024-03-08 DIAGNOSIS — R27.0 ATAXIA: ICD-10-CM

## 2024-03-08 DIAGNOSIS — G91.2 NPH (NORMAL PRESSURE HYDROCEPHALUS) (HCC): Primary | ICD-10-CM

## 2024-03-08 DIAGNOSIS — G91.2 NPH (NORMAL PRESSURE HYDROCEPHALUS) (HCC): ICD-10-CM

## 2024-03-08 LAB
T PALLIDUM AB SER QL IA: NONREACTIVE
TSI SER-ACNC: 2.06 MIU/ML (ref 0.55–4.78)
VIT B12 SERPL-MCNC: 516 PG/ML (ref 211–911)

## 2024-03-08 PROCEDURE — 86618 LYME DISEASE ANTIBODY: CPT

## 2024-03-08 PROCEDURE — 86780 TREPONEMA PALLIDUM: CPT

## 2024-03-08 PROCEDURE — 84443 ASSAY THYROID STIM HORMONE: CPT

## 2024-03-08 PROCEDURE — 83921 ORGANIC ACID SINGLE QUANT: CPT

## 2024-03-08 PROCEDURE — 99204 OFFICE O/P NEW MOD 45 MIN: CPT | Performed by: OTHER

## 2024-03-08 PROCEDURE — 83825 ASSAY OF MERCURY: CPT

## 2024-03-08 PROCEDURE — 82607 VITAMIN B-12: CPT

## 2024-03-08 PROCEDURE — 84446 ASSAY OF VITAMIN E: CPT | Performed by: OTHER

## 2024-03-08 PROCEDURE — 36415 COLL VENOUS BLD VENIPUNCTURE: CPT

## 2024-03-08 PROCEDURE — 84207 ASSAY OF VITAMIN B-6: CPT | Performed by: OTHER

## 2024-03-08 PROCEDURE — 86255 FLUORESCENT ANTIBODY SCREEN: CPT | Performed by: OTHER

## 2024-03-08 PROCEDURE — 86341 ISLET CELL ANTIBODY: CPT

## 2024-03-08 NOTE — PROGRESS NOTES
[facility] NEUROSCIENCES [address], [address]  [facility] [address]  [phone]            Neurology Initial Visit     Referred By: Dr. Talamantes ref. provider found    Chief Complaint:   Chief Complaint   Patient presents with    Neurologic Problem     New patient presents with balance problems, and dizziness. Patient states he had a CT and MRI done. Patient would like to review the results.        HPI:     Edis Olsen is a 73 year old male, who presents for newly developed balance problems.  Since beginning of 2024 patient started developing significant balance problems, he stopped walking around the neighborhood.  He was starting to shuffle, urinary urgency but no incontinence.  No cognitive changes.  Patient was relatively healthy before.  Except some hypercalcemia for which she was taking very small dose of a statin.  No history of hypertension, no smoking, no alcohol use.  MRI of the brain was done in February 2024 and it showed extensive white matter changes some some moderate degree of ventriculomegaly, some features of possible NPH was entertained.  .     Past Medical History:   Diagnosis Date    High cholesterol     Hyperlipidemia 01/01/2005    life style changes    Plantar fasciitis 01/01/2012    steroid injection x2    Psoriasis     SCC (squamous cell carcinoma) 2022       Past Surgical History:   Procedure Laterality Date    COLONOSCOPY  1999    COLONOSCOPY N/A 1/30/2023    Procedure: COLONOSCOPY;  Surgeon: Franki Lucero MD;  Location: [facility]    OTHER SURGICAL HISTORY  2013    Lt thumb 4 sutures       Social history:  History   Smoking Status    Former   Smokeless Tobacco    Never     History   Alcohol Use    Yes     Comment: occ, wine     History   Drug Use No       Family History   Problem Relation Age of Onset    Kidney Disease Father         kidney failure (cause of death)    Other (Other) Mother         natural causes (cause of death)         Current  Outpatient Medications:     clobetasol 0.05 % External Ointment, Use bid to psoriasis, Disp: 60 g, Rfl: 5    Hydrocortisone 2.5 % External Lotion, Use every day prn psoriasis on face, Disp: 120 mL, Rfl: 3    atorvastatin 10 MG Oral Tab, Take 1 tablet (10 mg total) by mouth nightly., Disp: 90 tablet, Rfl: 1    latanoprost 0.005 % Ophthalmic Solution, INSTILL 1 DROP INTO BOTH EYES AT BEDTIME AS DIRECTED, Disp: , Rfl: 1    No Known Allergies    ROS:   As in HPI, the rest of the 14 system review was done and was negative      Physical Exam:  Vitals:    03/08/24 1019   BP: 138/75   Pulse: 80   Weight: 184 lb (83.5 kg)   Height: 70\"       General: No apparent distress, well nourished, well groomed.  Head- Normocephalic, atraumatic  Eyes- No redness or swelling  ENT- Hearing intake, normal glutition  Neck- No masses or adenopathy  Cv: pulses were palpable and normal, no cyanosis or edema     Neurological:     Mental Status- Alert and oriented x3.  Normal attention span and concentration  Thought process intact  Memory intact- recent and remote  Mood intact  Fund of knowledge appropriate for education and age    Language intact including: comprehension, naming, repetition, vocabulary    Cranial Nerves:    VII. Face symmetric, no facial weakness  VIII. Hearing intact to whisper.  IX. Pallet elevates symmetrically.  XI. Shoulder shrug is intact  XII. Tongue is midline    Motor Exam:  Muscle tone normal  No atrophy or fasciculations  Strength- upper extremities 5/5 proximally and distally                  - lower  extremities 5/5 proximally and distally    Sensory Exam:  Light touch sensation- intact in all 4 extremities    Deep Tendon Reflexes:  Biceps 2+ bilateral symmetric  Triceps 2+ bilateral symmetric  Brachioradialis 2 + bilateral symmetric  Patellar 1 bilateral symmetric  Ankle jerk traces bilateral symmetric    No clonus  No Babinski sign    Coordination:  Finger to nose intact  Rapid alternating movements  intact    Gait:  Unsteady, wide-based gait, some shuffling.    Labs:    Lab Results   Component Value Date    TSH 2.686 02/29/2024     Lab Results   Component Value Date    HDL 59 07/24/2023    LDL 73 07/24/2023    TRIG 89 07/24/2023     Lab Results   Component Value Date    HGB 14.8 03/05/2024    HCT 41.5 03/05/2024    MCV 89.6 03/05/2024    WBC 5.4 03/05/2024    .0 03/05/2024      Lab Results   Component Value Date    BUN 16 03/05/2024    CA 10.0 03/05/2024    ALT 37 03/05/2024    AST 35 (H) 03/05/2024    ALKPHOS 54 09/11/2014    ALB 4.8 03/05/2024     03/05/2024    K 4.1 03/05/2024     03/05/2024    CO2 26.0 03/05/2024      I have reviewed labs.    Imaging Studies:  I have independently reviewed imaging.  MRI of the brain was independent reviewed, some atrophy, but also very severe white matter changes noted.        Assessment   1. NPH (normal pressure hydrocephalus) (HCC)  Relatively quick onset of ataxia.  Some urinary urgency but no incontinence, no cognitive changes.  However he is not paying bills, he is not very cognitively engaged.  MRI of the brain showed extensive white matter changes, somewhat unusual to be at this extent without having any long history of hypertension, diabetes or smoking.  Therefore possibility of NPH could be entertained.  Patient will be referred to our neurosurgeon for the consideration of lumbar punctures large-volume tap with physical therapy evaluation before and after.  Otherwise additional workup will be done to look for any other treatable cause of ataxia.    We discussed some remote possibility of other neurologic disorder such as MSA or PSP.    - Neurosurgery Referral - In Network  - Anti-Hu, Ri, Yo Antibody Profile, Serum  - HA-65 Autoantibody; Future  - Lyme Disease, Total Ab W Rflx; Future  - Mercury, Blood; Future  - Vitamin E, Serum  - Vitamin B6  - Vitamin B12; Future  - TSH W Reflex To Free T4; Future  - T Pallidum Screening [place]; Future  -  Methylmalonic Acid, Serum; Future  - MRI SPINE CERVICAL (CPT=72141); Future  - Physical Therapy Referral - [facility]    2. Ataxia    - Neurosurgery Referral - In Network  - Anti-Hu, Ri, Yo Antibody Profile, Serum  - HA-65 Autoantibody; Future  - Lyme Disease, Total Ab W Rflx; Future  - Mercury, Blood; Future  - Vitamin E, Serum  - Vitamin B6  - Vitamin B12; Future  - TSH W Reflex To Free T4; Future  - T Pallidum Screening [place]; Future  - Methylmalonic Acid, Serum; Future  - MRI SPINE CERVICAL (CPT=72141); Future  - Physical Therapy Referral - [facility]           Education and counseling provided to patient. Instructed patient to call my office or seek medical attention immediately if symptoms worsen.  Patient verbalized understanding of information given. All questions were answered. All side effects of drugs were discussed.     Return to clinic in: Return in about 3 months (around 6/8/2024), or if symptoms worsen or fail to improve.    Greg Tang MD

## 2024-03-10 NOTE — PROGRESS NOTES
ED Culture Callback Results Review    Pharmacist reviewed culture results from ED visit .    Preliminary blood culture positive for gram positive rods. Patient does not need to return to the ED at this time as culture is currently suggestive of contamination  as discussed with Dr. Mackey.    No further intervention required at this time.    Kike Ludwig, Courtney  Emergency Medicine Pharmacist Specialist  03/09/24; 8:14 PM

## 2024-03-11 LAB — VITAMIN B6: 19.3 UG/L

## 2024-03-11 NOTE — PROGRESS NOTES
ED Culture Callback Results Review    Pharmacist reviewed culture results from ED visit .    Final blood culture positive for cutibacterium acnes. Patient does not need to return to the ED at this time as culture is currently suggestive of contamination  as discussed with Dr. Ledbetter.     The patient was contacted by phone. The patient stated he was feeling well and has no current concerns or issues. The patient verbalized understanding of the updated treatment plan and all questions were answered.    Kike Ludwig PharmD  Emergency Medicine Pharmacist Specialist  03/11/24; 4:21 PM

## 2024-03-12 ENCOUNTER — PATIENT OUTREACH (OUTPATIENT)
Dept: CASE MANAGEMENT | Age: 74
End: 2024-03-12

## 2024-03-12 LAB
ANTI-HU AB: NEGATIVE
ANTI-RI AB: NEGATIVE
ANTI-YO AB: NEGATIVE
GAD-65: <5 U/ML

## 2024-03-12 NOTE — PROGRESS NOTES
1st attempt ER f/up apt request    Thu Haines NP  PCP  [address]  [facility] [address]  [phone]  Apt: March 14 @9:20am     Confirmed w/ pt  Closing encounter

## 2024-03-13 LAB
B BURGDOR IGG+IGM SER QL: NEGATIVE
MERCURY BLOOD: <1 UG/L
METHYLMALONIC ACID: 182 NMOL/L

## 2024-03-14 ENCOUNTER — OFFICE VISIT (OUTPATIENT)
Dept: INTERNAL MEDICINE CLINIC | Facility: CLINIC | Age: 74
End: 2024-03-14
Payer: MEDICARE

## 2024-03-14 VITALS
RESPIRATION RATE: 16 BRPM | WEIGHT: 183 LBS | BODY MASS INDEX: 26.2 KG/M2 | SYSTOLIC BLOOD PRESSURE: 123 MMHG | DIASTOLIC BLOOD PRESSURE: 85 MMHG | HEIGHT: 70 IN | HEART RATE: 102 BPM | OXYGEN SATURATION: 97 %

## 2024-03-14 DIAGNOSIS — R42 DIZZINESS: ICD-10-CM

## 2024-03-14 DIAGNOSIS — H66.92 ACUTE LEFT OTITIS MEDIA: ICD-10-CM

## 2024-03-14 DIAGNOSIS — U07.1 COVID-19: ICD-10-CM

## 2024-03-14 DIAGNOSIS — Z09 HOSPITAL DISCHARGE FOLLOW-UP: Primary | ICD-10-CM

## 2024-03-14 DIAGNOSIS — R09.81 NASAL CONGESTION: ICD-10-CM

## 2024-03-14 LAB
VIT E ALPHA TOCO: 16.8 MG/L
VIT E GAMMA TOCO: 0.7 MG/L

## 2024-03-14 PROCEDURE — 99214 OFFICE O/P EST MOD 30 MIN: CPT | Performed by: NURSE PRACTITIONER

## 2024-03-14 RX ORDER — FLUTICASONE PROPIONATE 50 MCG
2 SPRAY, SUSPENSION (ML) NASAL DAILY
Qty: 11.1 ML | Refills: 0 | Status: SHIPPED | OUTPATIENT
Start: 2024-03-14 | End: 2025-03-09

## 2024-03-14 RX ORDER — AZITHROMYCIN 250 MG/1
TABLET, FILM COATED ORAL
Qty: 6 TABLET | Refills: 0 | Status: SHIPPED | OUTPATIENT
Start: 2024-03-14 | End: 2024-03-18

## 2024-03-14 NOTE — PROGRESS NOTES
Edis Olsen is a 74 year old male.  Chief Complaint   Patient presents with    ER F/U     Covid      HPI:   He presents for hospital follow up. He went to the ER on 3/5.     He currently having a cough, congestion, post-nasal drip and runny nose.     Current Outpatient Medications   Medication Sig Dispense Refill    clobetasol 0.05 % External Ointment Use bid to psoriasis 60 g 5    Hydrocortisone 2.5 % External Lotion Use every day prn psoriasis on face 120 mL 3    atorvastatin 10 MG Oral Tab Take 1 tablet (10 mg total) by mouth nightly. 90 tablet 1    latanoprost 0.005 % Ophthalmic Solution INSTILL 1 DROP INTO BOTH EYES AT BEDTIME AS DIRECTED  1      Past Medical History:   Diagnosis Date    High cholesterol     Hyperlipidemia 01/01/2005    life style changes    Plantar fasciitis 01/01/2012    steroid injection x2    Psoriasis     SCC (squamous cell carcinoma) 2022      Past Surgical History:   Procedure Laterality Date    COLONOSCOPY  1999    COLONOSCOPY N/A 1/30/2023    Procedure: COLONOSCOPY;  Surgeon: Franki Lucero MD;  Location: [facility]    OTHER SURGICAL HISTORY  2013    Lt thumb 4 sutures      Social History:  Social History     Socioeconomic History    Marital status:    Tobacco Use    Smoking status: Former    Smokeless tobacco: Never   Vaping Use    Vaping Use: Never used   Substance and Sexual Activity    Alcohol use: Yes     Comment: occ, wine    Drug use: No    Sexual activity: Not Currently     Partners: Female   Other Topics Concern    Caffeine Concern Yes     Comment: soda, occ    Stress Concern No    Weight Concern No    Exercise No    Seat Belt Yes    Pt has a pacemaker No    Pt has a defibrillator No    Reaction to local anesthetic No      Family History   Problem Relation Age of Onset    Kidney Disease Father         kidney failure (cause of death)    Other (Other) Mother         natural causes (cause of death)      No Known Allergies     REVIEW OF SYSTEMS:   ***  Review of  Systems   Constitutional:  Negative for fever.   HENT:  Positive for congestion, postnasal drip and rhinorrhea.    Respiratory:  Positive for cough.    Musculoskeletal:  Negative for arthralgias.   Neurological:  Positive for dizziness. Negative for headaches.      Wt Readings from Last 5 Encounters:   03/14/24 183 lb (83 kg)   03/08/24 184 lb (83.5 kg)   03/05/24 186 lb (84.4 kg)   02/28/24 186 lb (84.4 kg)   02/22/24 182 lb (82.6 kg)     Body mass index is 26.26 kg/m².      EXAM:   /80   Pulse 102   Resp 16   Ht 5' 10\" (1.778 m)   Wt 183 lb (83 kg)   BMI 26.26 kg/m²   ***   Physical Exam       ASSESSMENT AND PLAN:   There are no diagnoses linked to this encounter.  Plan: ***      The patient indicates understanding of these issues and agrees to the plan.  No follow-ups on file.

## 2024-03-15 NOTE — PROGRESS NOTES
Edis Olsen is a 74 year old male.  Chief Complaint   Patient presents with    ER F/U     Covid      HPI:     He presents for hospital follow up. He went to the ER on 3/5. He went to the ER due to having weakness and body aches. He tested positive for Covid-19.     He currently having a cough, congestion, post-nasal drip and runny nose.     He is also having dizziness. This has been present for a few months. He did see neurology who referred him to neurosurgery. He has an appointment on April 8th. He is also having further testing done such as a MRI of the cervical spine. He did have a repeat CT scan of the brain in the ER which did not show any changes. He did have an MRI of the brain completed which showed Mild to moderate supratentorial ventriculomegaly, which may be secondary to normal pressure hydrocephalus given the acute callosal angle or parenchymal volume loss.    Current Outpatient Medications   Medication Sig Dispense Refill    azithromycin (ZITHROMAX Z-CRUZ) 250 MG Oral Tab Take 2 tablets (500 mg total) by mouth daily for 1 day, THEN 1 tablet (250 mg total) daily for 4 days. 6 tablet 0    fluticasone propionate 50 MCG/ACT Nasal Suspension 2 sprays by Each Nare route daily. 11.1 mL 0    clobetasol 0.05 % External Ointment Use bid to psoriasis 60 g 5    Hydrocortisone 2.5 % External Lotion Use every day prn psoriasis on face 120 mL 3    atorvastatin 10 MG Oral Tab Take 1 tablet (10 mg total) by mouth nightly. 90 tablet 1    latanoprost 0.005 % Ophthalmic Solution INSTILL 1 DROP INTO BOTH EYES AT BEDTIME AS DIRECTED  1      Past Medical History:   Diagnosis Date    High cholesterol     Hyperlipidemia 01/01/2005    life style changes    Plantar fasciitis 01/01/2012    steroid injection x2    Psoriasis     SCC (squamous cell carcinoma) 2022      Past Surgical History:   Procedure Laterality Date    COLONOSCOPY  1999    COLONOSCOPY N/A 1/30/2023    Procedure: COLONOSCOPY;  Surgeon: Franki Lucero,  MD;  Location: [facility]    OTHER SURGICAL HISTORY  2013    Lt thumb 4 sutures      Social History:  Social History     Socioeconomic History    Marital status:    Tobacco Use    Smoking status: Former    Smokeless tobacco: Never   Vaping Use    Vaping Use: Never used   Substance and Sexual Activity    Alcohol use: Yes     Comment: occ, wine    Drug use: No    Sexual activity: Not Currently     Partners: Female   Other Topics Concern    Caffeine Concern Yes     Comment: soda, occ    Stress Concern No    Weight Concern No    Exercise No    Seat Belt Yes    Pt has a pacemaker No    Pt has a defibrillator No    Reaction to local anesthetic No      Family History   Problem Relation Age of Onset    Kidney Disease Father         kidney failure (cause of death)    Other (Other) Mother         natural causes (cause of death)      No Known Allergies     REVIEW OF SYSTEMS:     Review of Systems   Constitutional:  Negative for fever.   HENT:  Positive for congestion, postnasal drip and rhinorrhea. Negative for sore throat.    Respiratory:  Positive for cough.    Cardiovascular:  Negative for chest pain.   Gastrointestinal: Negative.    Genitourinary: Negative.    Musculoskeletal: Negative.    Skin: Negative.    Neurological:  Positive for dizziness.   Psychiatric/Behavioral: Negative.        Wt Readings from Last 5 Encounters:   03/14/24 183 lb (83 kg)   03/08/24 184 lb (83.5 kg)   03/05/24 186 lb (84.4 kg)   02/28/24 186 lb (84.4 kg)   02/22/24 182 lb (82.6 kg)     Body mass index is 26.26 kg/m².      EXAM:   /85   Pulse 102   Resp 16   Ht 5' 10\" (1.778 m)   Wt 183 lb (83 kg)   SpO2 97%   BMI 26.26 kg/m²     Physical Exam  Vitals reviewed.   Constitutional:       Appearance: Normal appearance.   HENT:      Head: Normocephalic.      Right Ear: Tympanic membrane normal.      Left Ear: A middle ear effusion is present. Tympanic membrane is erythematous.      Nose: Congestion present.      Mouth/Throat:       Pharynx: No posterior oropharyngeal erythema.   Cardiovascular:      Rate and Rhythm: Normal rate and regular rhythm.      Pulses: Normal pulses.   Pulmonary:      Breath sounds: Normal breath sounds. No wheezing.   Musculoskeletal:         General: No swelling. Normal range of motion.   Skin:     General: Skin is warm and dry.   Neurological:      Mental Status: He is alert and oriented to person, place, and time.   Psychiatric:         Mood and Affect: Mood normal.         Behavior: Behavior normal.            ASSESSMENT AND PLAN:   1. Hospital discharge follow-up  - hospital notes, imaging and lab work reviewed     2. COVID-19  - symptoms resolving   - no fevers, SOB or chest pain     3. Nasal congestion  - caused by #2  - fluticasone propionate 50 MCG/ACT Nasal Suspension; 2 sprays by Each Nare route daily.  Dispense: 11.1 mL; Refill: 0    4. Acute left otitis media  - azithromycin (ZITHROMAX Z-CRUZ) 250 MG Oral Tab; Take 2 tablets (500 mg total) by mouth daily for 1 day, THEN 1 tablet (250 mg total) daily for 4 days.  Dispense: 6 tablet; Refill: 0    5. Dizziness  - follow up with neurosurgery as scheduled       The patient indicates understanding of these issues and agrees to the plan.  Return for if symptoms do not resolve.

## 2024-03-22 ENCOUNTER — HOSPITAL ENCOUNTER (OUTPATIENT)
Dept: MRI IMAGING | Age: 74
Discharge: HOME | End: 2024-03-22
Attending: Other
Payer: MEDICARE

## 2024-03-22 DIAGNOSIS — G91.2 NPH (NORMAL PRESSURE HYDROCEPHALUS) (HCC): ICD-10-CM

## 2024-03-22 DIAGNOSIS — R27.0 ATAXIA: ICD-10-CM

## 2024-03-22 PROCEDURE — 72141 MRI NECK SPINE W/O DYE: CPT | Performed by: OTHER

## 2024-04-03 ENCOUNTER — PATIENT MESSAGE (OUTPATIENT)
Dept: INTERNAL MEDICINE CLINIC | Facility: CLINIC | Age: 74
End: 2024-04-03

## 2024-04-04 ENCOUNTER — TELEPHONE (OUTPATIENT)
Dept: INTERNAL MEDICINE CLINIC | Facility: CLINIC | Age: 74
End: 2024-04-04

## 2024-04-04 NOTE — TELEPHONE ENCOUNTER
I called Dr. Miller's office and office is closed, they will open tomorrow at 8:30 am - 4:00 pm.       [Telephone encounter created today, 4/4/24 for this, see below]

## 2024-04-04 NOTE — TELEPHONE ENCOUNTER
Please advise if patient should be seen by neurology sooner than 4/18/24.     1. Dizziness  Patient with somewhat atypical presentation of dizziness after walking for certain distance.  CT scan shows no acute findings advanced chronic ischemic changes.  Carotid ultrasound done in November 2022 was normal.  We will check B12 and thyroid studies.  We will stop the atorvastatin for now to see if that is the effect.  Refer to neurology.  May consider MRI study depending on the timing of the neurology appointment.  - Neuro Referral - [facility] ([place])  - Vitamin B12; Future  - TSH W Reflex To Free T4; Future

## 2024-04-04 NOTE — TELEPHONE ENCOUNTER
[See App[employer] message encounter  from yesterday, 4/3/24]    Per Dr. Hernandez: Please call the office of the neurosurgeon and see if we can get it any quicker.  Patient has possible normal pressure hydrocephalus with increasing symptoms.    I called Dr. Miller's office and office is closed, they will open tomorrow at 8:30 am - 4:00 pm.     RN Triage - please follow-up tomorrow for sooner visit

## 2024-04-04 NOTE — TELEPHONE ENCOUNTER
Please call the office of the neurosurgeon and see if we can get it any quicker.  Patient has possible normal pressure hydrocephalus with increasing symptoms.

## 2024-04-05 ENCOUNTER — TELEPHONE (OUTPATIENT)
Dept: INTERNAL MEDICINE CLINIC | Facility: CLINIC | Age: 74
End: 2024-04-05

## 2024-04-05 NOTE — TELEPHONE ENCOUNTER
Current Outpatient Medications:     fluticasone propionate 50 MCG/ACT Nasal Suspension, 2 sprays by Each Nare route daily., Disp: 11.1 mL, Rfl: 0

## 2024-04-05 NOTE — TELEPHONE ENCOUNTER
Spoke to Chary from Neurosurgery office. She is able to add patient on with PA in that office Monday, 4/11.     She will call him to offer him appointment.     BK Rasheed.

## 2024-04-08 ENCOUNTER — OFFICE VISIT (OUTPATIENT)
Dept: SURGERY | Facility: CLINIC | Age: 74
End: 2024-04-08
Payer: MEDICARE

## 2024-04-08 VITALS
WEIGHT: 103 LBS | HEIGHT: 70 IN | DIASTOLIC BLOOD PRESSURE: 70 MMHG | HEART RATE: 95 BPM | BODY MASS INDEX: 14.75 KG/M2 | SYSTOLIC BLOOD PRESSURE: 130 MMHG

## 2024-04-08 DIAGNOSIS — G93.89 CEREBRAL VENTRICULOMEGALY: Primary | ICD-10-CM

## 2024-04-08 DIAGNOSIS — R26.9 GAIT DIFFICULTY: ICD-10-CM

## 2024-04-08 PROCEDURE — 99205 OFFICE O/P NEW HI 60 MIN: CPT | Performed by: NURSE PRACTITIONER

## 2024-04-08 NOTE — PATIENT INSTRUCTIONS
Refill policies:    Allow 2-3 business days for refills; controlled substances may take longer.  Contact your pharmacy at least 5 days prior to running out of medication and have them send an electronic request or submit request through the “request refill” option in your [employer] account.  Refills are not addressed on weekends; covering physicians do not authorize routine medications on weekends.  No narcotics or controlled substances are refilled after noon on Fridays or by on call physicians.  By law, narcotics must be electronically prescribed.  A 30 day supply with no refills is the maximum allowed.  If your prescription is due for a refill, you may be due for a follow up appointment.  To best provide you care, patients receiving routine medications need to be seen at least once a year.  Patients receiving narcotic/controlled substance medications need to be seen at least once every 3 months.  In the event that your preferred pharmacy does not have the requested medication in stock (e.g. Backordered), it is your responsibility to find another pharmacy that has the requested medication available.  We will gladly send a new prescription to that pharmacy at your request.    Scheduling Tests:    If your physician has ordered radiology tests such as MRI or CT scans, please contact Central Scheduling at [phone] right away to schedule the test.  Once scheduled, the [facility] Centralized Referral Team will work with your insurance carrier to obtain pre-certification or prior authorization.  Depending on your insurance carrier, approval may take 3-10 days.  It is highly recommended patients assure they have received an authorization before having a test performed.  If test is done without insurance authorization, patient may be responsible for the entire amount billed.      Precertification and Prior Authorizations:  If your physician has recommended that you have a procedure or additional testing performed the [facility]  Centralized Referral Team will contact your insurance carrier to obtain pre-certification or prior authorization.    You are strongly encouraged to contact your insurance carrier to verify that your procedure/test has been approved and is a COVERED benefit.  Although the [facility] Centralized Referral Team does its due diligence, the insurance carrier gives the disclaimer that \"Although the procedure is authorized, this does not guarantee payment.\"    Ultimately the patient is responsible for payment.   Thank you for your understanding in this matter.  Paperwork Completion:  If you require FMLA or disability paperwork for your recovery, please make sure to either drop it off or have it faxed to our office at [phone]. Be sure the form has your name and date of birth on it.  The form will be faxed to our Forms Department and they will complete it for you.  There is a 25$ fee for all forms that need to be filled out.  Please be aware there is a 10-14 day turnaround time.  You will need to sign a release of information (SHARON) form if your paperwork does not come with one.  You may call the Forms Department with any questions at [phone].  Their fax number is [phone].                You are scheduled for a High Volume Lumbar Drain Trial Insertion on TBD to be performed by  at  [facility].    This is an assessment to see if you would benefit from a Ventriculoperitoneal Shunt Insertion(). If this trial is successful, you will be scheduled for a  Shunt placement surgery after your follow up.        PCP clearance is needed.  We will faxed a letter to Dr Spivey 's office requesting clearance.  Please call their office for an appointment.     You will need blood work within 30 days of the procedure.  These orders have been placed in your chart. Labs include: Blood work which can be done anytime within 30 days of the procedure; but is best if completed no later than 7 days prior to allow for tests to  result and be reviewed. Please call central scheduling at  [phone] to make these appointments. Failure to complete labs may result in need to cancel or reschedule your procedure.     If you are on blood thinning medications such as Aspirin, Coumadin, Xarelto, Eliquis etc you will need to stop taking it  3-5 days prior to the procedure.  The specific length of time it needs to be held depends on which medication you are on.  You may need to get clearance from your prescribing provider to stop.  N/A.    Do not take any NSAIDs like ibuprofen, motrin, aleve, advil, naproxen etc for 7 days prior to the procedure.        Do not take vitamin C, E, K, fish oil, krill oil, tumeric or other over the counter herbal supplements for 7 days either as they affect blood clotting.    You can expect a call from the Interventional radiology pre-admissions staff about 1-2 days before your procedure. They will be call to review your medical history and medications and will give you day-of instructions including your expected arrival time.      You should have nothing to eat or drink after midnight the night before your procedure.    You will need to use antibacterial soap such as Dial the night before your procedure or the morning of your procedure.     You will check in at the registration desk that is located in the main lobby of the [facility], which is on the North side of [facility], you can expect to be asked to arrive 1 1/2 hours prior to your scheduled procedure. Please make sure to arrive by the requested arrival time dictated by the pre-admissions staff.     Your procedure will be done under Local sedation.     You can expect to be in the hospital for approximately 6-8 hours. During this time you will be assessed by Physical therapy.     You will need to have someone available to drive you home after your procedure.     Our prior-authorization team will work on obtaining authorization from your  insurance for this procedure. You will not hear about the authorization status unless we are running into any issues or delays.     You will need to be seen 1-2 weeks after your trial, if your trial was successful we will schedule your  shunt surgery at this appointment.     You will need to have someone accompany to your postoperative appointment. It is best if the person who accompanies you is a family member or person who has been in close contact with you since the procedure because Dr. Miller will be asking that person questions to determine if the trial was successful.     Questions will be asked about the following: Headaches, Vision, Sleep, Mentation, Speech, Balance, and urinary Incontinence. Please keep a log or record regarding changes noticed during this 1-2 week period to ensure the appropriate information is relayed to the doctor.      If  shunt placement is needed, we will attempt to schedule it within 30 days of your trial, however this will depend on surgery availability and an updated medical clearance may be required. We will discuss this further at your postprocedure appointment.      Your post op appt is scheduled on TBD with Dr. Miller at [facility].    You can expect  a post-procedure outreach call from our nursing staff around 3-5 days after your procedure.       REFILLS:  After surgery, please remember that we do have a 48 hour refill policy that does not include weekends, please make sure to request your medications in a timely manner so that you do not go days without medication.  *Refills should be requested through your pharmacy or through the refill request in Fididel (log in, go to medications, then select refill request).    [employer]ark[employer] MESSAGING:  Please remember that our office is closed during the weekend and no one is available for Fididel messages. If you have an urgent or emergent matter please go to a walk-in center or the emergency room. Also please remember your  iCouch messages are part of your legal medical record and should not be utilized as a personal email with our providers as it is visible to all [facility] employees. Also, Since [employer] messages are not for emergent matters it may be several days before there is a response to your message.    FMLA/PAPERWORK COMPLETED BY OUR MEDICAL FORMS DEPARTMENT:  If you require FMLA paperwork for your surgery, please make sure to either Drop it off or have it faxed to our office at [phone]. Make sure it has your NAME, , and has your signature. You will need to have a Release of Information on file. To facilitate this process we ask that you requested it at the  on your way out and sign it. Without a signed SHARON or signature on the form we will not be able to fax it and this will cause a delay with your forms. Fees charged for forms are $25 for initial submission and $15 for recertifications. If you have questions on the status of your forms please call the forms department at [phone].  **We do have a 3 week policy for all forms and paperwork, please make sure to allow plenty of time for completion. Same day paperwork will not be completed. **      If you have any questions or concerns please contact our office at [phone] #1 or via iCouch message.      **NURSING: Notify Physical Therapy of upcoming admission phone # [phone].      For Office Use Only:    Medical Clearances Needed:  PA:  CPT Codes

## 2024-04-08 NOTE — H&P
[facility]  Neurological Surgery Clinic Note    Edis Olsen  3/14/1950  [id number]  PCP: Patric Hernandez MD    REASON FOR VISIT:  Gait Difficulty  Ventriculomegaly    HISTORY OF PRESENT ILLNESS:  Edis Olsen is a 74 year old male with a PMH of HLD and SCC who was referred by Neurology for consultation for Normal Pressure Hydrocephalus. The patient states that since the beginning of the year he has had increasing difficulty in ambulation. He reports that he now needs a cane to ambulate and has developed a shuffling gait. Prior to this he was active around the house and would go for daily walks. He reports difficulty in getting up from a chair and will have dizziness. He denies any falls, but his spouse mentioned that he will sometimes trip up the stairs. He denies any incontinence. He reports increased cognitive impairment with word finding difficulty as well as short term memory difficulty. An MRI brain was completed which demonstrates ventriculomegaly. MRI of the cspine was negative for any cord compression.    PAST MEDICAL HISTORY:  Past Medical History:   Diagnosis Date    High cholesterol     Hyperlipidemia 01/01/2005    life style changes    Plantar fasciitis 01/01/2012    steroid injection x2    Psoriasis     SCC (squamous cell carcinoma) 2022       PAST SURGICAL HISTORY:  Past Surgical History:   Procedure Laterality Date    COLONOSCOPY  1999    COLONOSCOPY N/A 1/30/2023    Procedure: COLONOSCOPY;  Surgeon: Franki Lucero MD;  Location: [facility]    OTHER SURGICAL HISTORY  2013    Lt thumb 4 sutures       FAMILY HISTORY:  family history includes Kidney Disease in his father; Other in his mother.    SOCIAL HISTORY:   reports that he has quit smoking. He has never used smokeless tobacco. He reports current alcohol use. He reports that he does not use drugs.    ALLERGIES:  No Known Allergies    MEDICATIONS:  Current Outpatient Medications on File Prior to Visit   Medication Sig  Dispense Refill    clobetasol 0.05 % External Ointment Use bid to psoriasis 60 g 5    Hydrocortisone 2.5 % External Lotion Use every day prn psoriasis on face 120 mL 3    atorvastatin 10 MG Oral Tab Take 1 tablet (10 mg total) by mouth nightly. 90 tablet 1    latanoprost 0.005 % Ophthalmic Solution INSTILL 1 DROP INTO BOTH EYES AT BEDTIME AS DIRECTED  1     No current facility-administered medications on file prior to visit.       REVIEW OF SYSTEMS:  A 10-point system was reviewed.  Pertinent positives and negatives are noted in HPI.      PHYSICAL EXAMINATION:  VITAL SIGNS: /70 (BP Location: Left arm, Patient Position: Sitting, Cuff Size: adult)   Pulse 95   Ht 70\"   Wt 103 lb (46.7 kg)   BMI 14.78 kg/m²     A&Ox3, no acute distress  PERRL, EOMi, FS, TM  Full strength x 4, no drift  Sensation intact   Slow shuffling gait.     Imaging Review:  MRI Brain 3/7/24  1. No acute intracranial abnormality.  No abnormal parenchymal or leptomeningeal enhancement.   2. Severe chronic microvascular ischemic changes.   3. Mild to moderate supratentorial ventriculomegaly, which may be secondary to normal pressure hydrocephalus given the acute callosal angle or parenchymal volume loss.   4. Numerous foci of susceptibility artifact involving the supratentorial brain, which may reflect sequela of chronic microhemorrhages or cerebral amyloid angiopathy.   5. Mild-to-moderate global parenchymal volume loss   6. Lesser incidental findings described above.     I reviewed the imaging personally and agree with the interpretation.     ASSESSMENT:  73 yo male with a 4 month history of gait difficulty  Ventriculomegaly on MRI brain    Plan:  I discussed plan of care with Dr. Miller.  I reviewed the procedure of a high volume lumbar puncture to evaluate for NPH. We discussed the risks, benefits and alternatives. We discussed the possibility of needing a  shunt and the process for undergoing that procedure. The patient and spouse  were in agreement to proceed. I gave the option of scheduling an additional appointment with Dr. Miller to discuss further vs scheduling the procedure and meeting with Dr. Miller in the morning of the case. The patient and spouse were satisfied with the plan and requested to schedule the LP.     JANEY Haji, CNP  Neurological Surgery  [facility]    Care Time: 60 min including face to face time, chart review, imaging interpretation, and coordination of care

## 2024-04-09 PROBLEM — G91.2 NORMAL PRESSURE HYDROCEPHALUS (HCC): Status: ACTIVE | Noted: 2024-04-09

## 2024-04-10 ENCOUNTER — TELEPHONE (OUTPATIENT)
Dept: SURGERY | Facility: CLINIC | Age: 74
End: 2024-04-10

## 2024-04-10 DIAGNOSIS — R26.9 GAIT DIFFICULTY: ICD-10-CM

## 2024-04-10 DIAGNOSIS — G93.89 CEREBRAL VENTRICULOMEGALY: Primary | ICD-10-CM

## 2024-04-10 NOTE — TELEPHONE ENCOUNTER
Patients wife called stating she needs to know what next step is with . States that she was suppose to get phone call on Monday night? Please call patient to advice.

## 2024-04-10 NOTE — TELEPHONE ENCOUNTER
Msg below noted.    Call returned to pt.    Answered all questions pt and spouse have at this time in regards to upcoming sx.    Given Sx date of 5.22.24, pt and spouse agree upon date.    Informed there is nothing to do at this moment, r/t waiting for 30 days prior before getting clearance, labs.    Pt appreciative of outreach, nothing further needed at this time.

## 2024-04-10 NOTE — TELEPHONE ENCOUNTER
You are scheduled for a High Volume Lumbar Drain Trial Insertion on 5.22.24 to be performed by Dr. Miller at [facility].     This is an assessment to see if you would benefit from a Ventriculoperitoneal Shunt Insertion(). If this trial is successful, you will be scheduled for a  Shunt placement surgery after your follow up.       PCP clearance is needed.  We will faxed a letter to Dr Spivey 's office requesting clearance.  Please call their office for an appointment.      You will need blood work within 30 days of the procedure.  These orders have been placed in your chart. Labs include: Blood work which can be done anytime within 30 days of the procedure; but is best if completed no later than 7 days prior to allow for tests to result and be reviewed. Please call central scheduling at  [phone] to make these appointments. Failure to complete labs may result in need to cancel or reschedule your procedure.      If you are on blood thinning medications such as Aspirin, Coumadin, Xarelto, Eliquis etc you will need to stop taking it  3-5 days prior to the procedure.  The specific length of time it needs to be held depends on which medication you are on.  You may need to get clearance from your prescribing provider to stop.  N/A.    Do not take any NSAIDs like ibuprofen, motrin, aleve, advil, naproxen etc for 7 days prior to the procedure.        Do not take vitamin C, E, K, fish oil, krill oil, tumeric or other over the counter herbal supplements for 7 days either as they affect blood clotting.     You can expect a call from the Interventional radiology pre-admissions staff about 1-2 days before your procedure. They will be call to review your medical history and medications and will give you day-of instructions including your expected arrival time.      You should have nothing to eat or drink after midnight the night before your procedure.     You will need to use antibacterial soap such as Dial the  night before your procedure or the morning of your procedure.      You will check in at the registration desk that is located in the main lobby of the [facility], which is on the North side of the [facility], you can expect to be asked to arrive 1 1/2 hours prior to your scheduled procedure. Please make sure to arrive by the requested arrival time dictated by the pre-admissions staff.      Your procedure will be done under Local sedation.      You can expect to be in the hospital for approximately 6-8 hours. During this time you will be assessed by Physical therapy.      You will need to have someone available to drive you home after your procedure.      Our prior-authorization team will work on obtaining authorization from your insurance for this procedure. You will not hear about the authorization status unless we are running into any issues or delays.      You will need to be seen 1-2 weeks after your trial, if your trial was successful we will schedule your  shunt surgery at this appointment.      You will need to have someone accompany to your postoperative appointment. It is best if the person who accompanies you is a family member or person who has been in close contact with you since the procedure because Dr. Miller will be asking that person questions to determine if the trial was successful.      Questions will be asked about the following: Headaches, Vision, Sleep, Mentation, Speech, Balance, and urinary Incontinence. Please keep a log or record regarding changes noticed during this 1-2 week period to ensure the appropriate information is relayed to the doctor.       If  shunt placement is needed, we will attempt to schedule it within 30 days of your trial, however this will depend on surgery availability and an updated medical clearance may be required. We will discuss this further at your postprocedure appointment.       Your post op appt is scheduled on TBD with Dr. Miller at  [facility].     You can expect  a post-procedure outreach call from our nursing staff around 3-5 days after your procedure.         REFILLS:  After surgery, please remember that we do have a 48 hour refill policy that does not include weekends, please make sure to request your medications in a timely manner so that you do not go days without medication.  *Refills should be requested through your pharmacy or through the refill request in OneStopWeb (log in, go to medications, then select refill request).     [employer] MESSAGING:  Please remember that our office is closed during the weekend and no one is available for OneStopWeb messages. If you have an urgent or emergent matter please go to a walk-in center or the emergency room. Also please remember your [employer]Vib[employer]ix messages are part of your legal medical record and should not be utilized as a personal email with our providers as it is visible to all [facility] employees. Also, Since Sparktrend messages are not for emergent matters it may be several days before there is a response to your message.     FMLA/PAPERWORK COMPLETED BY OUR MEDICAL FORMS DEPARTMENT:  If you require FMLA paperwork for your surgery, please make sure to either Drop it off or have it faxed to our office at [phone]. Make sure it has your NAME, , and has your signature. You will need to have a Release of Information on file. To facilitate this process we ask that you requested it at the  on your way out and sign it. Without a signed SHARON or signature on the form we will not be able to fax it and this will cause a delay with your forms. Fees charged for forms are $25 for initial submission and $15 for recertifications. If you have questions on the status of your forms please call the forms department at [phone].  **We do have a 3 week policy for all forms and paperwork, please make sure to allow plenty of time for completion. Same day paperwork will not be completed. **        If  you have any questions or concerns please contact our office at [phone] #[phone] or via [email or web address] message.        **NURSING: Notify Physical Therapy of upcoming admission phone # [phone].        For Office Use Only:     Medical Clearances Needed:  PA:  CPT Codes

## 2024-04-10 NOTE — TELEPHONE ENCOUNTER
Patient is scheduled for High Volume Lumbar Puncture on 24 with Dr Miller at [facility].       Y Neuro IR (IVS) order placed  Y Pre-op lab orders placed-CBC, CMP, PTT, PT/INR, CSF protein, cell count, glucose, culture.  Y Emailed [facility] group in outlook with procedure information including name/, MD, date, time, dx, sx, and location.    Y PA Medicare, routed to PA team for initiation.    Y PT-Elizabeth Lang notified via e-mail. Same-day surgery notified via email.     CPT Code:  20846

## 2024-04-12 NOTE — TELEPHONE ENCOUNTER
Reschedule checklist for [facility] Procedure:      Y Email sent to [facility] group  Y Updated outlook date/time  Y Updated sx scheduling sheet  Y Canceled or rescheduled post procedure appointments  Y Updated flag in nursing pool  Y Provider informed of rescheduled date  Y Entire checklist completed

## 2024-04-23 ENCOUNTER — HOSPITAL ENCOUNTER (OUTPATIENT)
Dept: GENERAL RADIOLOGY | Age: 74
Discharge: HOME | End: 2024-04-23
Attending: NEUROLOGICAL SURGERY
Payer: MEDICARE

## 2024-04-23 DIAGNOSIS — R26.9 GAIT DIFFICULTY: ICD-10-CM

## 2024-04-23 DIAGNOSIS — G93.89 CEREBRAL VENTRICULOMEGALY: ICD-10-CM

## 2024-04-23 PROCEDURE — 71046 X-RAY EXAM CHEST 2 VIEWS: CPT | Performed by: NEUROLOGICAL SURGERY

## 2024-04-24 ENCOUNTER — PATIENT MESSAGE (OUTPATIENT)
Dept: SURGERY | Facility: CLINIC | Age: 74
End: 2024-04-24

## 2024-04-24 NOTE — TELEPHONE ENCOUNTER
From: Iza BARTLETT  To: Edis FUENTES Pretty  Sent: 4/24/2024 9:00 AM CDT  Subject: Updated Procedure instructions    Hi Cristian,    Here are your updated procedure instructions:    You are scheduled for a High Volume Lumbar Puncture on 5.14.24 to be performed by Dr. Miller at [facility].     This is an assessment to see if you would benefit from a Ventriculoperitoneal Shunt Insertion().      PCP clearance is needed. We will faxed a letter to Dr Spivey 's office requesting clearance. Please call their office for an appointment.      You will need blood work within 30 days of the procedure. These orders have been placed in your chart. Labs include: Blood work which can be done anytime within 30 days of the procedure; but is best if completed no later than 7 days prior to allow for tests to result and be reviewed. Please call central scheduling at [phone] to make these appointments. Failure to complete labs may result in need to cancel or reschedule your procedure.      If you are on blood thinning medications such as Aspirin, Coumadin, Xarelto, Eliquis etc you will need to stop taking it 3-5 days prior to the procedure. The specific length of time it needs to be held depends on which medication you are on. You may need to get clearance from your prescribing provider to stop. N/A.     Do not take any NSAIDs like ibuprofen, motrin, aleve, advil, naproxen etc for 7 days prior to the procedure.Do not take vitamin C, E, K, fish oil, krill oil, tumeric or other over the counter herbal supplements for 7 days either as they affect blood clotting.     You can expect a call from the Interventional radiology pre-admissions staff about 1-2 days before your procedure. They will be call to review your medical history and medications and will give you day-of instructions including your expected arrival time.     You should have nothing to eat or drink after midnight the night before your procedure.     You will need to use  antibacterial soap such as Dial the night before your procedure or the morning of your procedure.      You will check in at the registration desk that is located in the main lobby of the [facility], which is on the North side of the [facility] campus. Please make sure to arrive by the requested arrival time dictated by the pre-admissions staff.      Your procedure will be done under Local sedation.      You can expect to be in the hospital for approximately 6-8 hours. During this time you will be assessed by Physical therapy.      You will need to have someone available to drive you home after your procedure.      You will need to have someone accompany to your postoperative appointment. It is best if the person who accompanies you is a family member or person who has been in close contact with you since the procedure because Dr. Miller will be asking that person questions to determine if the procedure was successful.      Questions will be asked about the following: Headaches, Vision, Sleep, Mentation, Speech, Balance, and urinary Incontinence. Please keep a log or record regarding changes noticed during this 1-2 week period to ensure the appropriate information is relayed to the doctor.      Your post op appt is scheduled on 5.17.24 at 9:30 am with Dr. Miller at [facility].        REFILLS:  After surgery, please remember that we do have a 48 hour refill policy that does not include weekends, please make sure to request your medications in a timely manner so that you do not go days without medication.  *Refills should be requested through your pharmacy or through the refill request in [employer] (log in, go to medications, then select refill request).     [employer] MESSAGING:  Please remember that our office is closed during the weekend and no one is available for [employer] messages. If you have an urgent or emergent matter please go to a walk-in center or the emergency room. Also please remember your  [employer] messages are part of your legal medical record and should not be utilized as a personal email with our providers as it is visible to all [facility] employees. Also, Since [employer] messages are not for emergent matters it may be several days before there is a response to your message.     FMLA/PAPERWORK COMPLETED BY OUR MEDICAL FORMS DEPARTMENT:  If you require FMLA paperwork for your surgery, please make sure to either Drop it off or have it faxed to our office at [phone]. Make sure it has your NAME, , and has your signature. You will need to have a Release of Information on file. To facilitate this process we ask that you requested it at the  on your way out and sign it. Without a signed SHARON or signature on the form we will not be able to fax it and this will cause a delay with your forms. Fees charged for forms are $25 for initial submission and $15 for recertifications. If you have questions on the status of your forms please call the forms department at [phone].  **We do have a 3 week policy for all forms and paperwork, please make sure to allow plenty of time for completion. Same day paperwork will not be completed. **        If you have any questions or concerns please contact our office at [phone] #1 or via [employer] message.    Thank you,  Iza  Surgery Scheduler  [facility]

## 2024-04-24 NOTE — TELEPHONE ENCOUNTER
Patient sent a [employer] message asking for lab orders.  Orders had been placed on 4.10.24.      Reply sent to patient informing him of above.

## 2024-04-25 ENCOUNTER — OFFICE VISIT (OUTPATIENT)
Dept: INTERNAL MEDICINE CLINIC | Facility: CLINIC | Age: 74
End: 2024-04-25
Payer: MEDICARE

## 2024-04-25 VITALS
WEIGHT: 187 LBS | HEART RATE: 100 BPM | SYSTOLIC BLOOD PRESSURE: 150 MMHG | HEIGHT: 70 IN | BODY MASS INDEX: 26.77 KG/M2 | DIASTOLIC BLOOD PRESSURE: 90 MMHG

## 2024-04-25 DIAGNOSIS — Z01.818 PREOP EXAMINATION: Primary | ICD-10-CM

## 2024-04-25 PROCEDURE — 99214 OFFICE O/P EST MOD 30 MIN: CPT | Performed by: NURSE PRACTITIONER

## 2024-04-25 NOTE — PROGRESS NOTES
HPI:    Patient ID: Edis Olsen is a 74 year old male.    HPI Precedure- High Volum Lumbar Puncture Drain Trial  74 year old male who started with difficulty with balance in January.  He is following  with Dr. Miller neurosurgery.    Immunization History   Administered Date(s) Administered    Covid-19 Vaccine Pfizer 30 mcg/0.3 ml 02/19/2021, 02/20/2021, 03/13/2021, 03/18/2021, 10/19/2021    Covid-19 Vaccine Pfizer Bivalent 30mcg/0.3mL 11/16/2022    Covid-19 Vaccine Pfizer Artem-Sucrose 30 mcg/0.3 ml 04/26/2022    FLU VAC High Dose 65 YRS & Older PRSV Free (18753) 11/12/2020, 11/16/2022    FLUAD High Dose 65 yr and older (68683) 10/30/2021    HIGH DOSE FLU 65 YRS AND OLDER PRSV FREE SINGLE D ([id number]) FLU CLINIC 11/12/2020, 11/16/2022, 09/19/2023    Influenza 10/30/2021    Pfizer Covid-19 Vaccine 30mcg/0.3ml 12yrs+ ([id number]) 09/24/2023    TDAP 06/01/2016       Past Medical History:    High cholesterol    Hyperlipidemia    life style changes    Plantar fasciitis    steroid injection x2    Psoriasis    SCC (squamous cell carcinoma)      Past Surgical History:   Procedure Laterality Date    Colonoscopy  1999    Colonoscopy N/A 1/30/2023    Procedure: COLONOSCOPY;  Surgeon: Franki Lucero MD;  Location: [facility]    Other surgical history  2013    Lt thumb 4 sutures      Social History     Socioeconomic History    Marital status:    Tobacco Use    Smoking status: Former    Smokeless tobacco: Never   Vaping Use    Vaping status: Never Used   Substance and Sexual Activity    Alcohol use: Yes     Comment: occ, wine    Drug use: No    Sexual activity: Not Currently     Partners: Female   Other Topics Concern    Caffeine Concern Yes     Comment: soda, occ    Stress Concern No    Weight Concern No    Exercise No    Seat Belt Yes    Pt has a pacemaker No    Pt has a defibrillator No    Reaction to local anesthetic No          Review of Systems   Constitutional:  Negative for chills, fatigue and fever.    HENT:  Negative for ear pain, hearing loss, sinus pain, sore throat and trouble swallowing.    Eyes:  Negative for pain and visual disturbance.   Respiratory:  Negative for cough, chest tightness and shortness of breath.    Cardiovascular:  Negative for chest pain, palpitations and leg swelling.   Gastrointestinal:  Negative for abdominal pain, constipation, diarrhea, nausea and vomiting.   Endocrine: Negative for cold intolerance and heat intolerance.   Genitourinary:  Negative for dysuria and hematuria.   Musculoskeletal:  Negative for back pain and joint swelling.   Skin:  Negative for rash.   Allergic/Immunologic: Negative for environmental allergies.   Neurological:  Negative for weakness, numbness and headaches.   Hematological:  Does not bruise/bleed easily.   Psychiatric/Behavioral:  Negative for dysphoric mood and sleep disturbance. The patient is not nervous/anxious.               Current Outpatient Medications   Medication Sig Dispense Refill    clobetasol 0.05 % External Ointment Use bid to psoriasis 60 g 5    atorvastatin 10 MG Oral Tab Take 1 tablet (10 mg total) by mouth nightly. 90 tablet 1    latanoprost 0.005 % Ophthalmic Solution INSTILL 1 DROP INTO BOTH EYES AT BEDTIME AS DIRECTED  1    Hydrocortisone 2.5 % External Lotion Use every day prn psoriasis on face (Patient not taking: Reported on 4/25/2024) 120 mL 3     Allergies:No Known Allergies   PHYSICAL EXAM:   Physical Exam  Constitutional:       Appearance: Normal appearance. He is well-developed.   HENT:      Head: Normocephalic.      Right Ear: Tympanic membrane normal.      Left Ear: Tympanic membrane normal.      Nose: Nose normal.      Mouth/Throat:      Mouth: Mucous membranes are moist.      Pharynx: No oropharyngeal exudate or posterior oropharyngeal erythema.   Eyes:      General:         Right eye: No discharge.         Left eye: No discharge.      Pupils: Pupils are equal, round, and reactive to light.   Cardiovascular:      Rate  and Rhythm: Normal rate and regular rhythm.      Heart sounds: Normal heart sounds. No murmur heard.     No friction rub. No gallop.   Pulmonary:      Effort: Pulmonary effort is normal. No respiratory distress.      Breath sounds: Normal breath sounds. No wheezing, rhonchi or rales.   Abdominal:      General: Bowel sounds are normal. There is no distension.      Palpations: Abdomen is soft. There is no mass.      Tenderness: There is no abdominal tenderness. There is no right CVA tenderness, left CVA tenderness or guarding.   Musculoskeletal:         General: No tenderness.      Cervical back: Normal range of motion and neck supple. No tenderness.      Right lower leg: No edema.      Left lower leg: No edema.   Lymphadenopathy:      Cervical: No cervical adenopathy.   Skin:     General: Skin is warm and dry.      Findings: No rash.   Neurological:      Mental Status: He is alert and oriented to person, place, and time.      Coordination: Coordination normal.      Gait: Gait normal.   Psychiatric:         Mood and Affect: Mood normal.         Behavior: Behavior normal.         Thought Content: Thought content normal.         Judgment: Judgment normal.       /90 (BP Location: Right arm, Patient Position: Sitting, Cuff Size: adult)   Pulse 100   Ht 5' 10\" (1.778 m)   Wt 187 lb (84.8 kg)   BMI 26.83 kg/m²   Wt Readings from Last 2 Encounters:   04/25/24 187 lb (84.8 kg)   04/08/24 103 lb (46.7 kg)     Body mass index is 26.83 kg/m².(2)  Lab Results   Component Value Date    WBC 4.6 04/29/2024    RBC 4.65 04/29/2024    HGB 14.4 04/29/2024    HCT 42.8 04/29/2024    MCV 92.0 04/29/2024    MCH 31.0 04/29/2024    MCHC 33.6 04/29/2024    RDW 12.1 04/29/2024    .0 04/29/2024    MPV 7.8 01/24/2017      Lab Results   Component Value Date    GLU 90 04/29/2024    BUN 19 04/29/2024    BUNCREA 16.5 04/29/2024    CREATSERUM 1.15 04/29/2024    ANIONGAP 4 04/29/2024    GFRNAA 70 09/28/2021    GFRAA 81 09/28/2021    CA  9.8 04/29/2024    OSMOCALC 292 04/29/2024    ALKPHO 70 04/29/2024    AST 34 04/29/2024    ALT 40 04/29/2024    ALKPHOS 54 09/11/2014    BILT 1.0 04/29/2024    TP 7.7 04/29/2024    ALB 4.6 04/29/2024    GLOBULIN 3.1 04/29/2024    AGRATIO 1.3 09/11/2014     04/29/2024    K 4.4 04/29/2024     04/29/2024    CO2 28.0 04/29/2024      No results found for: \"EAG\", \"A1C\"   Lab Results   Component Value Date    CHOLEST 149 07/24/2023    TRIG 89 07/24/2023    HDL 59 07/24/2023    LDL 73 07/24/2023    VLDL 14 07/24/2023    NONHDLC 90 07/24/2023    CALCNONHDL 191 (H) 09/11/2014      Lab Results   Component Value Date    TSH 2.057 03/08/2024                ASSESSMENT/PLAN:     Problem List Items Addressed This Visit       Preop examination - Primary      Normal exam/Medically Cleared for procedure    EKG - WNL  Labs WNL    Stop all ibuprofen, aspirin, over-the-counter cough and cold medications, vitamins as well as supplements for 10 days prior to surgery.     Call if you develop any symptoms of a cough or urinary infection or skin infection prior to surgery.     Follow-up evaluation 2 weeks after surgery.                    No orders of the defined types were placed in this encounter.      Meds This Visit:  Requested Prescriptions      No prescriptions requested or ordered in this encounter       Imaging & Referrals:  None         JANEY Spencer

## 2024-04-29 ENCOUNTER — EKG ENCOUNTER (OUTPATIENT)
Dept: LAB | Age: 74
End: 2024-04-29
Attending: NEUROLOGICAL SURGERY
Payer: MEDICARE

## 2024-04-29 ENCOUNTER — LAB ENCOUNTER (OUTPATIENT)
Dept: LAB | Age: 74
End: 2024-04-29
Attending: NEUROLOGICAL SURGERY
Payer: MEDICARE

## 2024-04-29 DIAGNOSIS — G93.89 CEREBRAL VENTRICULOMEGALY: ICD-10-CM

## 2024-04-29 DIAGNOSIS — R26.9 GAIT DIFFICULTY: ICD-10-CM

## 2024-04-29 LAB
ALBUMIN SERPL-MCNC: 4.6 G/DL (ref 3.2–4.8)
ALBUMIN/GLOB SERPL: 1.5 {RATIO} (ref 1–2)
ALP LIVER SERPL-CCNC: 70 U/L
ALT SERPL-CCNC: 40 U/L
ANION GAP SERPL CALC-SCNC: 4 MMOL/L (ref 0–18)
APTT PPP: 31.3 SECONDS (ref 23.3–35.6)
AST SERPL-CCNC: 34 U/L (ref ?–34)
ATRIAL RATE: 70 BPM
BASOPHILS # BLD AUTO: 0.02 X10(3) UL (ref 0–0.2)
BASOPHILS NFR BLD AUTO: 0.4 %
BILIRUB SERPL-MCNC: 1 MG/DL (ref 0.2–1.1)
BUN BLD-MCNC: 19 MG/DL (ref 9–23)
BUN/CREAT SERPL: 16.5 (ref 10–20)
CALCIUM BLD-MCNC: 9.8 MG/DL (ref 8.7–10.4)
CHLORIDE SERPL-SCNC: 108 MMOL/L (ref 98–112)
CO2 SERPL-SCNC: 28 MMOL/L (ref 21–32)
CREAT BLD-MCNC: 1.15 MG/DL
DEPRECATED RDW RBC AUTO: 41 FL (ref 35.1–46.3)
EGFRCR SERPLBLD CKD-EPI 2021: 66 ML/MIN/1.73M2 (ref 60–?)
EOSINOPHIL # BLD AUTO: 0.12 X10(3) UL (ref 0–0.7)
EOSINOPHIL NFR BLD AUTO: 2.6 %
ERYTHROCYTE [DISTWIDTH] IN BLOOD BY AUTOMATED COUNT: 12.1 % (ref 11–15)
FASTING STATUS PATIENT QL REPORTED: YES
GLOBULIN PLAS-MCNC: 3.1 G/DL (ref 2.8–4.4)
GLUCOSE BLD-MCNC: 90 MG/DL (ref 70–99)
HCT VFR BLD AUTO: 42.8 %
HGB BLD-MCNC: 14.4 G/DL
IMM GRANULOCYTES # BLD AUTO: 0.01 X10(3) UL (ref 0–1)
IMM GRANULOCYTES NFR BLD: 0.2 %
INR BLD: 0.97 (ref 0.8–1.2)
LYMPHOCYTES # BLD AUTO: 1.51 X10(3) UL (ref 1–4)
LYMPHOCYTES NFR BLD AUTO: 32.7 %
MCH RBC QN AUTO: 31 PG (ref 26–34)
MCHC RBC AUTO-ENTMCNC: 33.6 G/DL (ref 31–37)
MCV RBC AUTO: 92 FL
MONOCYTES # BLD AUTO: 0.44 X10(3) UL (ref 0.1–1)
MONOCYTES NFR BLD AUTO: 9.5 %
NEUTROPHILS # BLD AUTO: 2.52 X10 (3) UL (ref 1.5–7.7)
NEUTROPHILS # BLD AUTO: 2.52 X10(3) UL (ref 1.5–7.7)
NEUTROPHILS NFR BLD AUTO: 54.6 %
OSMOLALITY SERPL CALC.SUM OF ELEC: 292 MOSM/KG (ref 275–295)
P AXIS: 54 DEGREES
P-R INTERVAL: 186 MS
PLATELET # BLD AUTO: 227 10(3)UL (ref 150–450)
POTASSIUM SERPL-SCNC: 4.4 MMOL/L (ref 3.5–5.1)
PROT SERPL-MCNC: 7.7 G/DL (ref 5.7–8.2)
PROTHROMBIN TIME: 13.5 SECONDS (ref 11.6–14.8)
Q-T INTERVAL: 396 MS
QRS DURATION: 92 MS
QTC CALCULATION (BEZET): 427 MS
R AXIS: 0 DEGREES
RBC # BLD AUTO: 4.65 X10(6)UL
SODIUM SERPL-SCNC: 140 MMOL/L (ref 136–145)
T AXIS: 29 DEGREES
VENTRICULAR RATE: 70 BPM
WBC # BLD AUTO: 4.6 X10(3) UL (ref 4–11)

## 2024-04-29 PROCEDURE — 36415 COLL VENOUS BLD VENIPUNCTURE: CPT

## 2024-04-29 PROCEDURE — 93005 ELECTROCARDIOGRAM TRACING: CPT

## 2024-04-29 PROCEDURE — 80053 COMPREHEN METABOLIC PANEL: CPT

## 2024-04-29 PROCEDURE — 85025 COMPLETE CBC W/AUTO DIFF WBC: CPT

## 2024-04-29 PROCEDURE — 85610 PROTHROMBIN TIME: CPT

## 2024-04-29 PROCEDURE — 93010 ELECTROCARDIOGRAM REPORT: CPT | Performed by: INTERNAL MEDICINE

## 2024-04-29 PROCEDURE — 85730 THROMBOPLASTIN TIME PARTIAL: CPT

## 2024-05-08 PROBLEM — Z01.818 PREOP EXAMINATION: Status: ACTIVE | Noted: 2024-05-08

## 2024-05-08 NOTE — ASSESSMENT & PLAN NOTE
Normal exam/Medically Cleared for procedure    EKG - WNL  Labs WNL    Stop all ibuprofen, aspirin, over-the-counter cough and cold medications, vitamins as well as supplements for 10 days prior to surgery.     Call if you develop any symptoms of a cough or urinary infection or skin infection prior to surgery.     Follow-up evaluation 2 weeks after surgery.

## 2024-05-09 NOTE — PAT NURSING NOTE
Per PAT encounter/MyChart message sent to pt:    PreOp Instructions     You are scheduled for: an Neuro Interventional Radiology Procedure     Date of Procedure: 05/14/24 Tuesday     Diet Instructions: Do not eat or drink anything after midnight     Medications: May take your pain medication (Tylenol) if needed on the morning of the procedure with a couple sips of water.      Medications to Stop: Continue to hold herbal supplements and vitamins.     Skin Prep: Shower with antibacterial soap using a clean washcloth, prior to procedure     Arrival Time: The day prior to your procedure (Monday) you will receive a phone call before 6:00 pm with your arrival time. If you haven't received a phone call, please check your voicemail messages., If you did not receive a voice mail and it is after 6:00 pm, please call the nursing supervisor at [phone].    Driving After Procedure: You will need a responsible adult  to drive you home; Cannot take uber or cab unless approved by physician     Discharge Teaching: Your nurse will give you specific instructions before discharge, Most people can resume normal activities in 2-3 days, Any questions, please call the physician's office      parking is available starting at 6 am or park in the [place] parking garage at [facility]. Check in at the [facility] reception desk. Our  will be there to check you in for your procedure. Please bring your insurance cards and ID with you.                                                                                                                                      Please DO NOT respond to this message, the inbasket is not monitored for messages. For any questions, please call the physician's office.

## 2024-05-14 ENCOUNTER — HOSPITAL ENCOUNTER (OUTPATIENT)
Dept: INTERVENTIONAL RADIOLOGY/VASCULAR | Facility: HOSPITAL | Age: 74
Discharge: HOME | End: 2024-05-14
Attending: NEUROLOGICAL SURGERY | Admitting: NEUROLOGICAL SURGERY

## 2024-05-14 VITALS
BODY MASS INDEX: 25.77 KG/M2 | TEMPERATURE: 97 F | HEIGHT: 70 IN | RESPIRATION RATE: 16 BRPM | SYSTOLIC BLOOD PRESSURE: 139 MMHG | DIASTOLIC BLOOD PRESSURE: 84 MMHG | HEART RATE: 68 BPM | OXYGEN SATURATION: 97 % | WEIGHT: 180 LBS

## 2024-05-14 DIAGNOSIS — R26.9 GAIT DIFFICULTY: ICD-10-CM

## 2024-05-14 DIAGNOSIS — G93.89 CEREBRAL VENTRICULOMEGALY: ICD-10-CM

## 2024-05-14 LAB
CLARITY CSF: CLEAR
COLOR CSF: COLORLESS
COUNT PERFORMED ON TUBE: 3
GLUCOSE CSF-MCNC: 56 MG/DL (ref 40–70)
PROT PATTERN CSF ELPH-IMP: 96.4 MG/DL (ref 15–45)
RBC # CSF: 1 /MM3 (ref ?–1)
TOTAL CELLS COUNTED CSF: 0 /MM3 (ref 0–5)
TOTAL VOLUME CSF: 16 ML

## 2024-05-14 PROCEDURE — 62329 THER SPI PNXR CSF FLUOR/CT: CPT | Performed by: NEUROLOGICAL SURGERY

## 2024-05-14 PROCEDURE — 87205 SMEAR GRAM STAIN: CPT | Performed by: NEUROLOGICAL SURGERY

## 2024-05-14 PROCEDURE — 82945 GLUCOSE OTHER FLUID: CPT | Performed by: NEUROLOGICAL SURGERY

## 2024-05-14 PROCEDURE — 87070 CULTURE OTHR SPECIMN AEROBIC: CPT | Performed by: NEUROLOGICAL SURGERY

## 2024-05-14 PROCEDURE — 009U3ZZ DRAINAGE OF SPINAL CANAL, PERCUTANEOUS APPROACH: ICD-10-PCS | Performed by: NEUROLOGICAL SURGERY

## 2024-05-14 PROCEDURE — 89050 BODY FLUID CELL COUNT: CPT | Performed by: NEUROLOGICAL SURGERY

## 2024-05-14 PROCEDURE — 97530 THERAPEUTIC ACTIVITIES: CPT

## 2024-05-14 PROCEDURE — 84157 ASSAY OF PROTEIN OTHER: CPT | Performed by: NEUROLOGICAL SURGERY

## 2024-05-14 PROCEDURE — 97161 PT EVAL LOW COMPLEX 20 MIN: CPT

## 2024-05-14 PROCEDURE — B01BZZZ FLUOROSCOPY OF SPINAL CORD: ICD-10-PCS | Performed by: NEUROLOGICAL SURGERY

## 2024-05-14 RX ORDER — CEFAZOLIN SODIUM/WATER 2 G/20 ML
SYRINGE (ML) INTRAVENOUS
Status: COMPLETED
Start: 2024-05-14 | End: 2024-05-14

## 2024-05-14 RX ORDER — LIDOCAINE HYDROCHLORIDE AND EPINEPHRINE BITARTRATE 20; .01 MG/ML; MG/ML
INJECTION, SOLUTION SUBCUTANEOUS
Status: COMPLETED
Start: 2024-05-14 | End: 2024-05-14

## 2024-05-14 NOTE — DISCHARGE INSTRUCTIONS
Normal Lumbar Puncture  You have had a lumbar puncture. This test is also called a spinal tap.     Home care  Follow these tips when caring for yourself at home:  Once at home, rest as directed by your healthcare provider.  Stay hydrated, unless you are on fluid restriction due to a medical problem   You may also use acetaminophen for pain relief     Follow-up care  Follow up with your healthcare provider, or as advised.   When to get medical care  Call your healthcare provider right away if any of these occur:  Head or neck pain that doesn't go away or that gets worse when upright and improves with lying flat   You feel less alert or have trouble waking up  Repeated upset stomach (nausea) or vomiting  Swelling, pain, bruising, or redness at the puncture site  Fever of 100.4°F (38°C) or higher, or as advised by your provider  Any new neurological symptoms including: leg numbness/weakness, saddle anesthesia, and bowel and bladder incontinence

## 2024-05-14 NOTE — PHYSICAL THERAPY NOTE
PHYSICAL THERAPY EVALUATION - INPATIENT     Room Number: 1623  Evaluation Date: 2024  Type of Evaluation: Initial  Physician Order: PT Eval and Treat    Presenting Problem: HVLP trial     Reason for Therapy: Mobility Dysfunction and Discharge Planning    PHYSICAL THERAPY ASSESSMENT   Patient is currently functioning at baseline with bed mobility, transfers, and gait.  Prior to admission, patient's baseline is MOD I.  Gait assessment and MOCA performed pre HVLP.  Will re-assess post HVLP.     PLAN  PT Treatment Plan: Gait training  Rehab Potential : Good  Frequency (Obs): BID  Number of Visits to Meet Established Goals: 1      CURRENT GOALS    Goal #1 Gait assessment s/p HVLP     Goal #2 MOCA s/p HVLP     Goal #3      Goal #4    Goal #5    Goal #6    Goal Comments: Goals established on 2024      PHYSICAL THERAPY MEDICAL/SOCIAL HISTORY  History related to current admission: Patient is a 74 year old male admitted on 2024 from home for elective HVLP to assess for NPH.     HOME SITUATION  Type of Home: House                    Lives With: Spouse     Patient Owned Equipment: Rolling walker;Cane           SUBJECTIVE  \"I shuffle my feet.\"       OBJECTIVE  Precautions: None  Fall Risk: Standard fall risk    WEIGHT BEARING RESTRICTION  Weight Bearing Restriction: None                PAIN ASSESSMENT  Ratin          COGNITION  MOCA score pre HVLP: 24/30    RANGE OF MOTION AND STRENGTH ASSESSMENT  Upper extremity ROM and strength are within functional limits     Lower extremity ROM is within functional limits     Lower extremity strength is within functional limits       BALANCE  Static Sitting: Good  Dynamic Sitting: Good  Static Standing: Fair  Dynamic Standing: Fair    ADDITIONAL TESTS                                    ACTIVITY TOLERANCE                         O2 WALK       NEUROLOGICAL FINDINGS                        AM-PAC '6-Clicks' INPATIENT SHORT FORM - BASIC MOBILITY  How much difficulty does  the patient currently have...  Patient Difficulty: Turning over in bed (including adjusting bedclothes, sheets and blankets)?: None   Patient Difficulty: Sitting down on and standing up from a chair with arms (e.g., wheelchair, bedside commode, etc.): None   Patient Difficulty: Moving from lying on back to sitting on the side of the bed?: None   How much help from another person does the patient currently need...   Help from Another: Moving to and from a bed to a chair (including a wheelchair)?: None   Help from Another: Need to walk in hospital room?: None   Help from Another: Climbing 3-5 steps with a railing?: None       AM-PAC Score:  Raw Score: 24   Approx Degree of Impairment: 0%   Standardized Score (AM-PAC Scale): 61.14   CMS Modifier (G-Code): CH    FUNCTIONAL ABILITY STATUS  Gait Assessment   Functional Mobility/Gait Assessment  Gait Assistance: Modified independent  Distance (ft): 200  Assistive Device: Cane    Skilled Therapy Provided     Bed Mobility:  Rolling: MOD I  Supine to sit: MOD I   Sit to supine: MOD I     Transfer Mobility:  Sit to stand: MOD I   Stand to sit: MOD I  Gait = MOD I    Gait training: Pt ambulates with narrow BRIGITTE, flat foot initial contact, and slight external rotation. Pt demonstrates slow sara and poor foot clearance but no festinating noted.   Gait speed: 0.46 m/sec      Exercise/Education Provided:  Bed mobility  Energy conservation  Functional activity tolerated  Gait training  Posture  Strengthening  Transfer training           Patient Evaluation Complexity Level:  History Moderate - 1 or 2 personal factors and/or co-morbidities   Examination of body systems Low - addressing 1-2 elements   Clinical Presentation Low - Stable   Clinical Decision Making Low - Stable       PT Session Time: 30 minutes  Gait Training:  minutes  Therapeutic Activity: 10 minutes  Neuromuscular Re-education:  minutes  Therapeutic Exercise:  minutes

## 2024-05-14 NOTE — PHYSICAL THERAPY NOTE
PHYSICAL THERAPY TREATMENT NOTE - INPATIENT    Room Number:  IVS HOLD/ IVS HOLD     Session: 1     Number of Visits to Meet Established Goals: 1    Presenting Problem: HVLP trial     PHYSICAL THERAPY MEDICAL/SOCIAL HISTORY  History related to current admission: Patient is a 74 year old male admitted on 2024 from home for elective HVLP to assess for NPH.   ASSESSMENT   Patient demonstrates good  progress this session, goals   Met .    Patient continues to function at baseline with bed mobility, transfers, and gait.         PT Treatment Plan: Gait training  Rehab Potential : Good  Frequency (Obs): BID    CURRENT GOALS     Goal #1 Gait assessment s/p HVLP      Goal #2 MOCA s/p HVLP      Goal #3        Goal #4     Goal #5     Goal #6     Goal Comments: Goals established on 2024 all goals ongoing    SUBJECTIVE  \"I feel ok\"    OBJECTIVE  Precautions: None    WEIGHT BEARING RESTRICTION  Weight Bearing Restriction: None                PAIN ASSESSMENT   Ratin          COGNITION  MOCA score Post HVLP       BALANCE                                                                                                                       Static Sitting: Good  Dynamic Sitting: Good           Static Standing: Fair  Dynamic Standing: Fair    ACTIVITY TOLERANCE                         O2 WALK         AM-PAC '6-Clicks' INPATIENT SHORT FORM - BASIC MOBILITY  How much difficulty does the patient currently have...  Patient Difficulty: Turning over in bed (including adjusting bedclothes, sheets and blankets)?: None   Patient Difficulty: Sitting down on and standing up from a chair with arms (e.g., wheelchair, bedside commode, etc.): None   Patient Difficulty: Moving from lying on back to sitting on the side of the bed?: None   How much help from another person does the patient currently need...   Help from Another: Moving to and from a bed to a chair (including a wheelchair)?: None   Help from Another:  Need to walk in hospital room?: None   Help from Another: Climbing 3-5 steps with a railing?: None       AM-PAC Score:  Raw Score: 24   Approx Degree of Impairment: 0%   Standardized Score (AM-PAC Scale): 61.14   CMS Modifier (G-Code): CH    FUNCTIONAL ABILITY STATUS  Gait Assessment   Functional Mobility/Gait Assessment  Gait Assistance: Modified independent  Distance (ft): 150  Assistive Device: None    Skilled Therapy Provided    Bed Mobility:  Rolling: IND   Supine<>Sit: IND   Sit<>Supine: IND     Transfer Mobility:  Sit<>Stand: IND   Stand<>Sit: IND   Gait: Mod I 150ft using no assisted device    Therapist's Comments: Pt demonstrated and MOCA v 8.1 score of 29/30.  Pt was observed to be able to ambulate without the use of the SPC.   Pt was observed with improved gait speed of 0.6m/s without the use of the SPC. Pt was observed with no LOB when ambulating.       PT Session Time: 23 minutes  Therapeutic Activity: 23 minutes

## 2024-05-14 NOTE — H&P
History & Physical Examination    Patient Name: Edis Olsen  MRN: [id number]  [facility]: [id number]  YOB: 1950    Diagnosis: Ventriculomegaly    Present Illness: Edis Olsen is a 74 year old male with a PMH of HLD and SCC who was referred by Neurology for consultation for Normal Pressure Hydrocephalus. The patient states that since the beginning of the year he has had increasing difficulty in ambulation. He reports that he now needs a cane to ambulate and has developed a shuffling gait. Prior to this he was active around the house and would go for daily walks. He reports difficulty in getting up from a chair and will have dizziness. He denies any falls, but his spouse mentioned that he will sometimes trip up the stairs. He denies any incontinence. He reports increased cognitive impairment with word finding difficulty as well as short term memory difficulty. An MRI brain was completed which demonstrates ventriculomegaly. MRI of the cspine was negative for any cord compression.       Has been NPO MN  Does  not take ASA, Plavix, or other anticoagulants.  Denies recent illness such as fevers or GI symptoms.  Labs from 4/29/24 reviewed: BMP, CBC, PT/INR all WNL.    No current facility-administered medications for this encounter.       Allergies: No Known Allergies    Past Medical History:    High cholesterol    Hyperlipidemia    life style changes    Plantar fasciitis    steroid injection x2    Psoriasis    SCC (squamous cell carcinoma)     Past Surgical History:   Procedure Laterality Date    Colonoscopy  1999    Colonoscopy N/A 1/30/2023    Procedure: COLONOSCOPY;  Surgeon: Franki Lucero MD;  Location: [facility]    Other surgical history  2013    Lt thumb 4 sutures     Family History   Problem Relation Age of Onset    Kidney Disease Father         kidney failure (cause of death)    Other (Other) Mother         natural causes (cause of death)     Social History     Tobacco Use    Smoking status:  Former    Smokeless tobacco: Never   Substance Use Topics    Alcohol use: Yes     Comment: occ, wine       SYSTEM Check if Review is Normal Check if Physical Exam is Normal If not normal, please explain:   HEENT [X] [X]    NECK & BACK [X] [X]    HEART [X] [X]    LUNGS [X] [X]    ABDOMEN [X] [X]    UROGENITAL [ ] [ ] deferred   EXTREMITIES [X] [X]    Pedal Pulses        Neurological Exam:  Mental status: Oriented to person, place, and time   Speech: Fluent, no dysarthria  CN: II-XII grossly intact  Memory and comprehension: Intact   Motor: No drift, no focal arm or leg weakness.   Sensory: Intact to light touch    [ x ] I have discussed the risks and benefits and alternatives with the patient/family.  They understand and agree to proceed with plan of care.  [ x ] I have reviewed the History and Physical done within the last 30 days.  Any changes noted above.    Davida Rothman, APRN  5/14/2024, 8:30 AM  [facility] [id number]

## 2024-05-17 ENCOUNTER — TELEPHONE (OUTPATIENT)
Dept: INTERNAL MEDICINE CLINIC | Facility: CLINIC | Age: 74
End: 2024-05-17

## 2024-05-17 ENCOUNTER — TELEPHONE (OUTPATIENT)
Dept: SURGERY | Facility: CLINIC | Age: 74
End: 2024-05-17

## 2024-05-17 ENCOUNTER — OFFICE VISIT (OUTPATIENT)
Dept: SURGERY | Facility: CLINIC | Age: 74
End: 2024-05-17

## 2024-05-17 VITALS — DIASTOLIC BLOOD PRESSURE: 78 MMHG | HEART RATE: 81 BPM | OXYGEN SATURATION: 97 % | SYSTOLIC BLOOD PRESSURE: 140 MMHG

## 2024-05-17 DIAGNOSIS — G91.2 NPH (NORMAL PRESSURE HYDROCEPHALUS) (HCC): Primary | ICD-10-CM

## 2024-05-17 PROCEDURE — 99214 OFFICE O/P EST MOD 30 MIN: CPT | Performed by: NEUROLOGICAL SURGERY

## 2024-05-17 NOTE — TELEPHONE ENCOUNTER
[facility] clinical staff, please see below message. Is  ok with Thu TOTH seeing patient for surgical clearance?

## 2024-05-17 NOTE — TELEPHONE ENCOUNTER
You are scheduled for a  shunt placement on  shunt surgery with Dr. Miller on 6/4/24.     Pre-op instructions discussed with patient and surgical packet provided:    You will need to contact the Pre-admission department at [phone] to schedule your pre-op testing. Blood work needs to be done within 30 days of surgery and a COVID test.  COVID testing is required for all pre-surgical patients regardless of vaccination status.     No blood thinning medications, over the counter non-steroidal anti inflammatories (Advil, Aleve, Ibuprofen), herbal supplements (garlic,tumeric etc.), vitamin E, fish oil or krill oil for at least 7-14 days prior to surgery.     You may only take Tylenol, Extra Strength Tylenol, Arthritis Tylenol, or prescription Norco or Tramadol for pain if something is needed.    You should have nothing to eat or drink after 11:00pm the night prior to surgery unless instructed differently by Pre-admissions nurse.    Do drink 12 ounces of regular Gatorade (NOT RED) 12 hours and 4 hours prior to your scheduled surgery time, Do not drink any other liquids (including water) before your surgery. Do not chew gum or eat candies before surgery.  Take 1000 mg of Tylenol (Acetaminophen) 4 hours before your scheduled surgery time, take this with your scheduled Gatorade.    You will be in the ICU overnight and then transferred to regular nursing unit    Average inpatient stay is about 2-3 days.  This is an estimate and varies from person to person.  Ultimately, the surgeon will determine when you are ready to be discharged.    PCP clearance is needed, our office will fax a letter to your PCP, Dr. Hernandez please contact their office for an appointment.     Our office will get authorization for surgery.The hospital will contact you 1-2 days before surgery with your arrival time.     If you were on blood thinners (such as Coumadin, Pradaxa, Xarelto, etc) prior to surgery that we had you stop for surgery,  please make sure you get instructions about when to resume the medication before you are discharged from the hospital        Your 2 week post op appointment is scheduled with JANEY Haji.  1015.   2024 10:15 AM Davida Rothman APRN         Please make sure to arrive at least 15 minutes prior to your scheduled appointment in order to get checked in and roomed in a timely manner.  Depending on provider availability, late patients may be required to reschedule.  REFILLS:  After surgery, please remember that we do have a 48 hour refill policy that does not include weekends, please make sure to request your medications in a timely manner so that you do not go days without medication.  *Refills should be requested through your pharmacy or through the refill request in [employer] (log in, go to medications, then select refill request).  [email or web address] MESSAGING:  Please remember that our office is closed during the weekend and no one is available for [employer] messages. If you have an urgent or emergent matter please go to a walk-in center or the emergency room. Also please remember your [employer] messages are part of your legal medical record and should not be utilized as a personal email with our providers as it is visible to all [facility] employees. Also, Since [employer]et messages are not for emergent matters it may be several days before there is a response to your message.  FMLA/PAPERWORK COMPLETED BY OUR MEDICAL FORMS DEPARTMENT:  If you require FMLA paperwork for your surgery, please make sure to either Drop it off or have it faxed to our office at [phone]. Make sure it has your NAME, , and has your signature. You will need to have a Release of Information on file. To facilitate this process we ask that you requested it at the  on your way out and sign it. Without a signed SHARON or signature on the form we will not be able to fax it and this will cause a delay with your forms. Fees charged for  forms are $25 for initial submission and $15 for recertifications. If you have questions on the status of your forms please call the forms department at [phone].  **We do have a 3 week policy for all forms and paperwork, please make sure to allow plenty of time for completion. Same day paperwork will not be completed. **    Please visit the following website for the detailed and up-to-date visitor screening and restriction policy at [facility] [email or web address].    CPT codes:  46909

## 2024-05-17 NOTE — TELEPHONE ENCOUNTER
Just please verify with the surgeon that it is ok that I see the patient for surgery clearance. Thank you.

## 2024-05-17 NOTE — TELEPHONE ENCOUNTER
Please advise  Pt stated he received a message on his phone - Thu can not do do the Pre op for Monday , to schedule with Dr Hernandez ( out of the office from 5/17/Patient stated he not understand this , he has seen You before     Clearance for surgery June 4th 5/17- Left msg with patient this appt needs to be rescheduled with Dr. Hernandez since it is a pre op. Refer to TE for the appt. - LA

## 2024-05-17 NOTE — PATIENT INSTRUCTIONS
You are scheduled for a  shunt placement on  shunt surgery with Dr. Miller on 6/4/24.     Pre-op instructions discussed with patient and surgical packet provided:    You will need to contact the Pre-admission department at [phone] to schedule your pre-op testing. Blood work needs to be done within 30 days of surgery and a COVID test.  COVID testing is required for all pre-surgical patients regardless of vaccination status.     No blood thinning medications, over the counter non-steroidal anti inflammatories (Advil, Aleve, Ibuprofen), herbal supplements (garlic,tumeric etc.), vitamin E, fish oil or krill oil for at least 7-14 days prior to surgery.     You may only take Tylenol, Extra Strength Tylenol, Arthritis Tylenol, or prescription Norco or Tramadol for pain if something is needed.    You should have nothing to eat or drink after 11:00pm the night prior to surgery unless instructed differently by Pre-admissions nurse.    Do drink 12 ounces of regular Gatorade (NOT RED) 12 hours and 4 hours prior to your scheduled surgery time, Do not drink any other liquids (including water) before your surgery. Do not chew gum or eat candies before surgery.  Take 1000 mg of Tylenol (Acetaminophen) 4 hours before your scheduled surgery time, take this with your scheduled Gatorade.    You will be in the ICU overnight and then transferred to regular nursing unit    Average inpatient stay is about 2-3 days.  This is an estimate and varies from person to person.  Ultimately, the surgeon will determine when you are ready to be discharged.    PCP clearance is needed, our office will fax a letter to your PCP, Dr. Hernandez please contact their office for an appointment.     Our office will get authorization for surgery.The hospital will contact you 1-2 days before surgery with your arrival time.     If you were on blood thinners (such as Coumadin, Pradaxa, Xarelto, etc) prior to surgery that we had you stop for surgery,  please make sure you get instructions about when to resume the medication before you are discharged from the hospital        Your 2 week post op appointment is scheduled with JANEY Haji.  1015.       Please make sure to arrive at least 15 minutes prior to your scheduled appointment in order to get checked in and roomed in a timely manner.  Depending on provider availability, late patients may be required to reschedule.  REFILLS:  After surgery, please remember that we do have a 48 hour refill policy that does not include weekends, please make sure to request your medications in a timely manner so that you do not go days without medication.  *Refills should be requested through your pharmacy or through the refill request in [employer] (log in, go to medications, then select refill request).  [employer] MESSAGING:  Please remember that our office is closed during the weekend and no one is available for [employer] messages. If you have an urgent or emergent matter please go to a walk-in center or the emergency room. Also please remember your [email or web address] messages are part of your legal medical record and should not be utilized as a personal email with our providers as it is visible to all [facility] employees. Also, Since [employer] messages are not for emergent matters it may be several days before there is a response to your message.  FMLA/PAPERWORK COMPLETED BY OUR MEDICAL FORMS DEPARTMENT:  If you require FMLA paperwork for your surgery, please make sure to either Drop it off or have it faxed to our office at [phone]. Make sure it has your NAME, , and has your signature. You will need to have a Release of Information on file. To facilitate this process we ask that you requested it at the  on your way out and sign it. Without a signed SHARON or signature on the form we will not be able to fax it and this will cause a delay with your forms. Fees charged for forms are $25 for initial submission and $15  for recertifications. If you have questions on the status of your forms please call the forms department at [phone].  **We do have a 3 week policy for all forms and paperwork, please make sure to allow plenty of time for completion. Same day paperwork will not be completed. **    Please visit the following website for the detailed and up-to-date visitor screening and restriction policy at [facility] [email or web address].     For Office Use Only:    Medical Clearance needed:  PA:  CPT Codes:               Refill policies:    Allow 2-3 business days for refills; controlled substances may take longer.  Contact your pharmacy at least 5 days prior to running out of medication and have them send an electronic request or submit request through the “request refill” option in your [employer] account.  Refills are not addressed on weekends; covering physicians do not authorize routine medications on weekends.  No narcotics or controlled substances are refilled after noon on Fridays or by on call physicians.  By law, narcotics must be electronically prescribed.  A 30 day supply with no refills is the maximum allowed.  If your prescription is due for a refill, you may be due for a follow up appointment.  To best provide you care, patients receiving routine medications need to be seen at least once a year.  Patients receiving narcotic/controlled substance medications need to be seen at least once every 3 months.  In the event that your preferred pharmacy does not have the requested medication in stock (e.g. Backordered), it is your responsibility to find another pharmacy that has the requested medication available.  We will gladly send a new prescription to that pharmacy at your request.    Scheduling Tests:    If your physician has ordered radiology tests such as MRI or CT scans, please contact Central Scheduling at [phone] right away to schedule the test.  Once scheduled, the [facility]  Centralized Referral Team will work with your insurance carrier to obtain pre-certification or prior authorization.  Depending on your insurance carrier, approval may take 3-10 days.  It is highly recommended patients assure they have received an authorization before having a test performed.  If test is done without insurance authorization, patient may be responsible for the entire amount billed.      Precertification and Prior Authorizations:  If your physician has recommended that you have a procedure or additional testing performed the [facility] Centralized Referral Team will contact your insurance carrier to obtain pre-certification or prior authorization.    You are strongly encouraged to contact your insurance carrier to verify that your procedure/test has been approved and is a COVERED benefit.  Although the [facility] Centralized Referral Team does its due diligence, the insurance carrier gives the disclaimer that \"Although the procedure is authorized, this does not guarantee payment.\"    Ultimately the patient is responsible for payment.   Thank you for your understanding in this matter.  Paperwork Completion:  If you require FMLA or disability paperwork for your recovery, please make sure to either drop it off or have it faxed to our office at [phone]. Be sure the form has your name and date of birth on it.  The form will be faxed to our Forms Department and they will complete it for you.  There is a 25$ fee for all forms that need to be filled out.  Please be aware there is a 10-14 day turnaround time.  You will need to sign a release of information (SHARON) form if your paperwork does not come with one.  You may call the Forms Department with any questions at [phone].  Their fax number is [phone].

## 2024-05-17 NOTE — TELEPHONE ENCOUNTER
Pre op appointment was approved by Thu Haines; appointment on 5/20/24 with her will stay as is.  Called and informed the patient and he understood.

## 2024-05-18 NOTE — PROGRESS NOTES
[place]  Neurological Surgery Clinic Note    Edis Olsen  3/14/1950  [id number]  PCP: Patric Hernandez MD    REASON FOR VISIT:  NPH    HISTORY OF PRESENT ILLNESS:  Edis Olsen is a 74 year old male who presents to clinic for follow up after high volume LP.  He and his wife report significant improvement in gait and balance, as well as clarity of thought following the high volume LP that began the day after the procedure, and has since peaked, and is now worsening. Mild protein elevation in CSF noted. Denies positional headaches or other new symptoms.    PAST MEDICAL HISTORY:  Past Medical History:    High cholesterol    Hyperlipidemia    life style changes    Plantar fasciitis    steroid injection x2    Psoriasis    SCC (squamous cell carcinoma)       PAST SURGICAL HISTORY:  Past Surgical History:   Procedure Laterality Date    Colonoscopy  1999    Colonoscopy N/A 1/30/2023    Procedure: COLONOSCOPY;  Surgeon: Franki Lucero MD;  [facility]    Other surgical history  2013    Lt thumb 4 sutures       FAMILY HISTORY:  family history includes Kidney Disease in his father; Other in his mother.    SOCIAL HISTORY:   reports that he has quit smoking. He has never used smokeless tobacco. He reports current alcohol use. He reports that he does not use drugs.    ALLERGIES:  No Known Allergies    MEDICATIONS:  Current Outpatient Medications on File Prior to Visit   Medication Sig Dispense Refill    clobetasol 0.05 % External Ointment Use bid to psoriasis 60 g 5    Hydrocortisone 2.5 % External Lotion Use every day prn psoriasis on face (Patient not taking: Reported on 4/25/2024) 120 mL 3    atorvastatin 10 MG Oral Tab Take 1 tablet (10 mg total) by mouth nightly. 90 tablet 1    latanoprost 0.005 % Ophthalmic Solution INSTILL 1 DROP INTO BOTH EYES AT BEDTIME AS DIRECTED  1     No current facility-administered medications on file prior to visit.       REVIEW  OF SYSTEMS:  A 10-point system was reviewed.  Pertinent positives and negatives are noted in HPI.      PHYSICAL EXAMINATION:  VITAL SIGNS: /78   Pulse 81   SpO2 97%     A&Ox3, no acute distress  PERRL, EOMi, FS, TM  Full strength x 4, no drift  Back puncture site c/d/i    ASSESSMENT:  73yo male with NPH    I reviewed with the patient and his wife the risks, benefits, alternatives, goals, and expectations of VPS and they wished to proceed.    Plan:  Will plan for VPS    Gerardo Miller MD  Neurological Surgery  [facility] Time: 30 min including face to face time, chart review, imaging interpretation, and coordination of care

## 2024-05-20 ENCOUNTER — OFFICE VISIT (OUTPATIENT)
Dept: INTERNAL MEDICINE CLINIC | Facility: CLINIC | Age: 74
End: 2024-05-20

## 2024-05-20 ENCOUNTER — HOSPITAL ENCOUNTER (OUTPATIENT)
Dept: CT IMAGING | Age: 74
Discharge: HOME | End: 2024-05-20
Attending: NEUROLOGICAL SURGERY

## 2024-05-20 VITALS
DIASTOLIC BLOOD PRESSURE: 86 MMHG | RESPIRATION RATE: 16 BRPM | BODY MASS INDEX: 26.05 KG/M2 | SYSTOLIC BLOOD PRESSURE: 136 MMHG | WEIGHT: 182 LBS | HEIGHT: 70 IN | HEART RATE: 72 BPM

## 2024-05-20 DIAGNOSIS — E78.5 HYPERLIPIDEMIA, UNSPECIFIED HYPERLIPIDEMIA TYPE: ICD-10-CM

## 2024-05-20 DIAGNOSIS — G91.2 NPH (NORMAL PRESSURE HYDROCEPHALUS) (HCC): ICD-10-CM

## 2024-05-20 DIAGNOSIS — Z01.818 PREOPERATIVE EXAMINATION: Primary | ICD-10-CM

## 2024-05-20 DIAGNOSIS — G91.2 NORMAL PRESSURE HYDROCEPHALUS (HCC): ICD-10-CM

## 2024-05-20 PROCEDURE — 70450 CT HEAD/BRAIN W/O DYE: CPT | Performed by: NEUROLOGICAL SURGERY

## 2024-05-20 PROCEDURE — 99214 OFFICE O/P EST MOD 30 MIN: CPT | Performed by: NURSE PRACTITIONER

## 2024-05-20 NOTE — PROGRESS NOTES
Edis Olsen is a 74 year old male.  Chief Complaint   Patient presents with    Pre-Op Exam     June 4th [facility]     HPI:     Edis Olsen presents for preoperative clearance for: Ventriculoperitoneal Shunt  Performing Physician: Dr. Miller   Date of surgery: 6/4/2024  Elective or emergency: Elective  Pre-operative forms provided: No    Current complaints:   NPH  Dizziness     Active medical problems:   Hyperlipidemia     Cervical/neck:   - Arthritis: No   - Neck pain: No   - Difficulty with ROM: No   - Prior neck injury/surgery: No     Cardiac:  - History of ischemic coronary disease: No   - History of heart failure: No   - Heart attack in past 90 days: No   - Chest pain in last 90 days: No  - History of Arrhythmia:  No   - History of stroke or TIA: No   - Diabetes/A1C: Last A1c value was  % done  .      - Most recent echo/Stress test: None   - Cardiac Medications: None    Pulmonary:   - Smoker: No   - Apnea/CPAP: Yes he had sleep study last year no CPAP  - Asthma/COPD:  No   - Difficulty laying flat for extended period of time: No  - Dyspnea/exertional: No   - Respiratory Medications: No     Functional Capacity:   Perform ADLs- eating, dress, toilet (1 METs)?  Yes   Walk up flight of steps, hill, walk ground level 3-4mph (4 METs)?  Yes   Perform heavy housework- scrubbing floors, move heavy furniture, climb 2 flights of stairs (4-10 METs)?  Yes   Participate in strenuous sports- swimming, singles tennis, football, basketball, ski (+10 METs)? No     Risk Assessment Tools:  body mass index is 26.11 kg/m².        Current Outpatient Medications   Medication Sig Dispense Refill    clobetasol 0.05 % External Ointment Use bid to psoriasis 60 g 5    atorvastatin 10 MG Oral Tab Take 1 tablet (10 mg total) by mouth nightly. 90 tablet 1    latanoprost 0.005 % Ophthalmic Solution INSTILL 1 DROP INTO BOTH EYES AT BEDTIME AS DIRECTED  1    Hydrocortisone 2.5 % External Lotion Use every day prn psoriasis on  face (Patient not taking: Reported on 4/25/2024) 120 mL 3      Past Medical History:    High cholesterol    Hyperlipidemia    life style changes    Plantar fasciitis    steroid injection x2    Psoriasis    SCC (squamous cell carcinoma)      Past Surgical History:   Procedure Laterality Date    Colonoscopy  1999    Colonoscopy N/A 1/30/2023    Procedure: COLONOSCOPY;  Surgeon: Franki Lucero MD;  Location: [facility]    Other surgical history  2013    Lt thumb 4 sutures      Social History:  Social History     Socioeconomic History    Marital status:    Tobacco Use    Smoking status: Former    Smokeless tobacco: Never   Vaping Use    Vaping status: Never Used   Substance and Sexual Activity    Alcohol use: Yes     Comment: occ, wine    Drug use: No    Sexual activity: Not Currently     Partners: Female   Other Topics Concern    Caffeine Concern Yes     Comment: soda, occ    Stress Concern No    Weight Concern No    Exercise No    Seat Belt Yes    Pt has a pacemaker No    Pt has a defibrillator No    Reaction to local anesthetic No      Family History   Problem Relation Age of Onset    Kidney Disease Father         kidney failure (cause of death)    Other (Other) Mother         natural causes (cause of death)      No Known Allergies     REVIEW OF SYSTEMS:     Review of Systems   Constitutional:  Negative for fever.   HENT: Negative.     Respiratory:  Negative for cough, shortness of breath and wheezing.    Cardiovascular:  Negative for chest pain.   Gastrointestinal:  Negative for abdominal pain, constipation, diarrhea, nausea and vomiting.   Genitourinary: Negative.    Musculoskeletal: Negative.    Skin: Negative.    Neurological:  Negative for dizziness and headaches.   Psychiatric/Behavioral: Negative.        Wt Readings from Last 5 Encounters:   05/20/24 182 lb (82.6 kg)   05/09/24 180 lb (81.6 kg)   04/25/24 187 lb (84.8 kg)   04/08/24 103 lb (46.7 kg)   03/14/24 183 lb (83 kg)     Body mass index  is 26.11 kg/m².      EXAM:   /86   Pulse 72   Resp 16   Ht 5' 10\" (1.778 m)   Wt 182 lb (82.6 kg)   BMI 26.11 kg/m²     Physical Exam  Vitals reviewed.   Constitutional:       Appearance: Normal appearance.   HENT:      Head: Normocephalic.      Right Ear: Tympanic membrane normal.      Left Ear: Tympanic membrane normal.   Eyes:      Extraocular Movements: Extraocular movements intact.      Pupils: Pupils are equal, round, and reactive to light.   Cardiovascular:      Rate and Rhythm: Normal rate and regular rhythm.      Pulses: Normal pulses.   Pulmonary:      Breath sounds: Normal breath sounds. No wheezing.   Abdominal:      General: Bowel sounds are normal.      Palpations: Abdomen is soft.      Tenderness: There is no abdominal tenderness.   Musculoskeletal:         General: No swelling. Normal range of motion.   Skin:     General: Skin is warm and dry.   Neurological:      Mental Status: He is alert and oriented to person, place, and time.   Psychiatric:         Mood and Affect: Mood normal.         Behavior: Behavior normal.        Component      Latest Ref [facility] 4/29/2024   WBC      4.0 - 11.0 x10(3) uL 4.6    RBC      3.80 - 5.80 x10(6)uL 4.65    Hemoglobin      13.0 - 17.5 g/dL 14.4    Hematocrit      39.0 - 53.0 % 42.8    MCV      80.0 - 100.0 fL 92.0    MCH      26.0 - 34.0 pg 31.0    MCHC      31.0 - 37.0 g/dL 33.6    RDW-SD      35.1 - 46.3 fL 41.0    RDW      11.0 - 15.0 % 12.1    Platelet Count      150.0 - 450.0 10(3)uL 227.0    Prelim Neutrophil Abs      1.50 - 7.70 x10 (3) uL 2.52    Neutrophils Absolute      1.50 - 7.70 x10(3) uL 2.52    Lymphocytes Absolute      1.00 - 4.00 x10(3) uL 1.51    Monocytes Absolute      0.10 - 1.00 x10(3) uL 0.44    Eosinophils Absolute      0.00 - 0.70 x10(3) uL 0.12    Basophils Absolute      0.00 - 0.20 x10(3) uL 0.02    Immature Granulocyte Absolute      0.00 - 1.00 x10(3) uL 0.01    Neutrophils %      % 54.6    Lymphocytes %      % 32.7    Monocytes  %      % 9.5    Eosinophils %      % 2.6    Basophils %      % 0.4    Immature Granulocyte %      % 0.2    Glucose      70 - 99 mg/dL 90    Sodium      136 - 145 mmol/L 140    Potassium      3.5 - 5.1 mmol/L 4.4    Chloride      98 - 112 mmol/L 108    Carbon Dioxide, Total      21.0 - 32.0 mmol/L 28.0    ANION GAP      0 - 18 mmol/L 4    BUN      9 - 23 mg/dL 19    CREATININE      0.70 - 1.30 mg/dL 1.15    BUN/CREATININE RATIO      10.0 - 20.0  16.5    CALCIUM      8.7 - 10.4 mg/dL 9.8    CALCULATED OSMOLALITY      275 - 295 mOsm/kg 292    EGFR      >=60 mL/min/1.73m2 66    ALT (SGPT)      10 - 49 U/L 40    AST (SGOT)      <=34 U/L 34    ALKALINE PHOSPHATASE      45 - 117 U/L 70    Total Bilirubin      0.2 - 1.1 mg/dL 1.0    PROTEIN, TOTAL      5.7 - 8.2 g/dL 7.7    Albumin      3.2 - 4.8 g/dL 4.6    Globulin      2.8 - 4.4 g/dL 3.1    A/G Ratio      1.0 - 2.0  1.5    Patient Fasting for CMP? Yes    PT      11.6 - 14.8 seconds 13.5    INR      0.80 - 1.20  0.97    APTT      23.3 - 35.6 seconds 31.3            ASSESSMENT AND PLAN:   1. Preoperative examination  - patient is cleared for surgery with low to moderate risk  - EKG shows NSR 4/2024  - lab work WNL 4/2024    2. Hyperlipidemia, unspecified hyperlipidemia type  - taking Lipitor 10mg daily     3. Normal pressure hydrocephalus (HCC)  -  shunt to be placed  - following with Dr. Miller       The patient indicates understanding of these issues and agrees to the plan.

## 2024-05-20 NOTE — PATIENT INSTRUCTIONS
Hold all supplements 7 days prior to surgery.     Only take tylenol if needed for discomfort.     Do not take ibuprofen, Advil or aspirin 7 days prior to surgery.

## 2024-05-20 NOTE — TELEPHONE ENCOUNTER
Per Dr. Miller:  Can you call him to tell him to get a new head CT for \"brain GPS\" used during surgery. The order is in. Thank you.     Called to patient to notify him of above. Scheduling number provided to patient. He will call to schedule.

## 2024-05-21 NOTE — TELEPHONE ENCOUNTER
Patient is scheduled for  Shunt at [facility] with Dr. Miller on 6.4.24.       Y     Surgical instructions reviewed by nursing staff with patient  Y      form completed  Y     Surgery order signed   Y    Placed on outlook calendar  -----[employer] message sent to patient with sx instructions-Iza  ----- Faxed pre-op clearance request to PCPRachael RAY    Post-op appointments made  Medicare PA Routed to PA team to initiate.    CPT Codes: 36753

## 2024-05-21 NOTE — TELEPHONE ENCOUNTER
Patient is scheduled for  Shunt insertion on 6.4.24 with Dr Miller at [facility].    NA pre-op apt scheduled (if sx is more than 30 days from last apt)  NApre-op apt scheduled with RN spine navigator  Y Surgical instructions reviewed by nursing staff with patient  Y  form completed  Y Surgery order signed   Y Placed sx on surgery sheet  Y Placed on outlook calendar  Y [employer] message sent to patient with sx instructions  Y Faxed pre-op clearance request to PCP DEE DEE REDDY Faxed letter to prescribing provider requesting anticoagulants be held for surgery  NA E-mail sent to [employer] - DR BURTON  Y Post-op appointments made  N PA MEDICARE. Routed to PA team to initiate.  Y Post-Op outreach pt reminder placed.   Y Entire Neurosurgery Checklist Completed    Clearances: PCP  PA:MEDICARE  CPT Codes: 58252

## 2024-05-23 ENCOUNTER — LABORATORY ENCOUNTER (OUTPATIENT)
Dept: LAB | Age: 74
End: 2024-05-23
Attending: NEUROLOGICAL SURGERY

## 2024-05-23 DIAGNOSIS — Z01.818 PRE-OP TESTING: ICD-10-CM

## 2024-05-23 DIAGNOSIS — G91.2 NORMAL PRESSURE HYDROCEPHALUS (HCC): ICD-10-CM

## 2024-05-23 LAB
ANTIBODY SCREEN: NEGATIVE
APTT PPP: 32.7 SECONDS (ref 23–36)
INR BLD: 1.05 (ref 0.8–1.2)
PROTHROMBIN TIME: 14.3 SECONDS (ref 11.6–14.8)
RH BLOOD TYPE: POSITIVE

## 2024-05-23 PROCEDURE — 86900 BLOOD TYPING SEROLOGIC ABO: CPT

## 2024-05-23 PROCEDURE — 36415 COLL VENOUS BLD VENIPUNCTURE: CPT

## 2024-05-23 PROCEDURE — 86901 BLOOD TYPING SEROLOGIC RH(D): CPT

## 2024-05-23 PROCEDURE — 85730 THROMBOPLASTIN TIME PARTIAL: CPT

## 2024-05-23 PROCEDURE — 86850 RBC ANTIBODY SCREEN: CPT

## 2024-05-23 PROCEDURE — 87081 CULTURE SCREEN ONLY: CPT

## 2024-05-23 PROCEDURE — 85610 PROTHROMBIN TIME: CPT

## 2024-05-24 NOTE — TELEPHONE ENCOUNTER
ASAP. PCP presurgical clearance received per OV note dated 5.20.24 in chart, \"patient is cleared for surgery with low to moderate risk\"

## 2024-06-04 ENCOUNTER — APPOINTMENT (OUTPATIENT)
Dept: CT IMAGING | Facility: HOSPITAL | Age: 74
End: 2024-06-04
Attending: NEUROLOGICAL SURGERY
Payer: MEDICARE

## 2024-06-04 ENCOUNTER — ANESTHESIA EVENT (OUTPATIENT)
Dept: SURGERY | Facility: HOSPITAL | Age: 74
End: 2024-06-04
Payer: MEDICARE

## 2024-06-04 ENCOUNTER — APPOINTMENT (OUTPATIENT)
Dept: GENERAL RADIOLOGY | Facility: HOSPITAL | Age: 74
End: 2024-06-04
Attending: NEUROLOGICAL SURGERY
Payer: MEDICARE

## 2024-06-04 ENCOUNTER — HOSPITAL ENCOUNTER (INPATIENT)
Facility: HOSPITAL | Age: 74
LOS: 1 days | Discharge: HOME | End: 2024-06-05
Attending: NEUROLOGICAL SURGERY | Admitting: NEUROLOGICAL SURGERY
Payer: MEDICARE

## 2024-06-04 ENCOUNTER — ANESTHESIA (OUTPATIENT)
Dept: SURGERY | Facility: HOSPITAL | Age: 74
End: 2024-06-04
Payer: MEDICARE

## 2024-06-04 DIAGNOSIS — Z01.818 PRE-OP TESTING: Primary | ICD-10-CM

## 2024-06-04 DIAGNOSIS — G91.2 NORMAL PRESSURE HYDROCEPHALUS (HCC): ICD-10-CM

## 2024-06-04 DIAGNOSIS — Z98.2 PRESENCE OF VENTRICULOPLEURAL SHUNT: ICD-10-CM

## 2024-06-04 PROBLEM — C44.92 SCC (SQUAMOUS CELL CARCINOMA): Status: ACTIVE | Noted: 2024-06-04

## 2024-06-04 LAB — RH BLOOD TYPE: POSITIVE

## 2024-06-04 PROCEDURE — 00163J6 BYPASS CEREBRAL VENTRICLE TO PERITONEAL CAVITY WITH SYNTHETIC SUBSTITUTE, PERCUTANEOUS APPROACH: ICD-10-PCS | Performed by: NEUROLOGICAL SURGERY

## 2024-06-04 PROCEDURE — 74018 RADEX ABDOMEN 1 VIEW: CPT | Performed by: NEUROLOGICAL SURGERY

## 2024-06-04 PROCEDURE — 0WJG4ZZ INSPECTION OF PERITONEAL CAVITY, PERCUTANEOUS ENDOSCOPIC APPROACH: ICD-10-PCS | Performed by: NEUROLOGICAL SURGERY

## 2024-06-04 PROCEDURE — 99291 CRITICAL CARE FIRST HOUR: CPT | Performed by: OTHER

## 2024-06-04 PROCEDURE — 70450 CT HEAD/BRAIN W/O DYE: CPT | Performed by: NEUROLOGICAL SURGERY

## 2024-06-04 PROCEDURE — 71045 X-RAY EXAM CHEST 1 VIEW: CPT | Performed by: NEUROLOGICAL SURGERY

## 2024-06-04 PROCEDURE — 70360 X-RAY EXAM OF NECK: CPT | Performed by: NEUROLOGICAL SURGERY

## 2024-06-04 PROCEDURE — 99223 1ST HOSP IP/OBS HIGH 75: CPT | Performed by: HOSPITALIST

## 2024-06-04 PROCEDURE — 70250 X-RAY EXAM OF SKULL: CPT | Performed by: NEUROLOGICAL SURGERY

## 2024-06-04 DEVICE — VALVE 42866 FP-STRATA 2 REGULAR
Type: IMPLANTABLE DEVICE | Site: HEAD | Status: FUNCTIONAL
Brand: STRATA®

## 2024-06-04 DEVICE — CATHETER 91101 VENTRICULAR ANTIMICROBIAL
Type: IMPLANTABLE DEVICE | Site: HEAD | Status: FUNCTIONAL
Brand: ARES™

## 2024-06-04 DEVICE — CATH 23047 PERIT STD OE W/SLITS 120: Type: IMPLANTABLE DEVICE | Site: ABDOMEN | Status: FUNCTIONAL

## 2024-06-04 RX ORDER — LATANOPROST 50 UG/ML
1 SOLUTION/ DROPS OPHTHALMIC NIGHTLY
Status: DISCONTINUED | OUTPATIENT
Start: 2024-06-04 | End: 2024-06-05

## 2024-06-04 RX ORDER — NALOXONE HYDROCHLORIDE 0.4 MG/ML
0.08 INJECTION, SOLUTION INTRAMUSCULAR; INTRAVENOUS; SUBCUTANEOUS AS NEEDED
Status: ACTIVE | OUTPATIENT
Start: 2024-06-04 | End: 2024-06-05

## 2024-06-04 RX ORDER — BUPIVACAINE HYDROCHLORIDE 2.5 MG/ML
INJECTION, SOLUTION EPIDURAL; INFILTRATION; INTRACAUDAL AS NEEDED
Status: DISCONTINUED | OUTPATIENT
Start: 2024-06-04 | End: 2024-06-04 | Stop reason: HOSPADM

## 2024-06-04 RX ORDER — DEXAMETHASONE SODIUM PHOSPHATE 4 MG/ML
VIAL (ML) INJECTION AS NEEDED
Status: DISCONTINUED | OUTPATIENT
Start: 2024-06-04 | End: 2024-06-04 | Stop reason: SURG

## 2024-06-04 RX ORDER — ATORVASTATIN CALCIUM 10 MG/1
10 TABLET, FILM COATED ORAL NIGHTLY
Status: DISCONTINUED | OUTPATIENT
Start: 2024-06-04 | End: 2024-06-04

## 2024-06-04 RX ORDER — ONDANSETRON 2 MG/ML
INJECTION INTRAMUSCULAR; INTRAVENOUS AS NEEDED
Status: DISCONTINUED | OUTPATIENT
Start: 2024-06-04 | End: 2024-06-04 | Stop reason: SURG

## 2024-06-04 RX ORDER — ESMOLOL HYDROCHLORIDE 10 MG/ML
INJECTION INTRAVENOUS AS NEEDED
Status: DISCONTINUED | OUTPATIENT
Start: 2024-06-04 | End: 2024-06-04 | Stop reason: SURG

## 2024-06-04 RX ORDER — ACETAMINOPHEN 500 MG
1000 TABLET ORAL ONCE
Status: DISCONTINUED | OUTPATIENT
Start: 2024-06-04 | End: 2024-06-04 | Stop reason: HOSPADM

## 2024-06-04 RX ORDER — HYDROMORPHONE HYDROCHLORIDE 1 MG/ML
0.4 INJECTION, SOLUTION INTRAMUSCULAR; INTRAVENOUS; SUBCUTANEOUS EVERY 5 MIN PRN
Status: ACTIVE | OUTPATIENT
Start: 2024-06-04 | End: 2024-06-05

## 2024-06-04 RX ORDER — HYDROCODONE BITARTRATE AND ACETAMINOPHEN 5; 325 MG/1; MG/1
1 TABLET ORAL EVERY 4 HOURS PRN
Status: DISCONTINUED | OUTPATIENT
Start: 2024-06-04 | End: 2024-06-05

## 2024-06-04 RX ORDER — NEOSTIGMINE METHYLSULFATE 1 MG/ML
INJECTION, SOLUTION INTRAVENOUS AS NEEDED
Status: DISCONTINUED | OUTPATIENT
Start: 2024-06-04 | End: 2024-06-04 | Stop reason: SURG

## 2024-06-04 RX ORDER — LABETALOL HYDROCHLORIDE 5 MG/ML
5 INJECTION, SOLUTION INTRAVENOUS EVERY 5 MIN PRN
Status: DISPENSED | OUTPATIENT
Start: 2024-06-04 | End: 2024-06-05

## 2024-06-04 RX ORDER — HYDROMORPHONE HYDROCHLORIDE 1 MG/ML
0.2 INJECTION, SOLUTION INTRAMUSCULAR; INTRAVENOUS; SUBCUTANEOUS EVERY 5 MIN PRN
Status: ACTIVE | OUTPATIENT
Start: 2024-06-04 | End: 2024-06-05

## 2024-06-04 RX ORDER — EPHEDRINE SULFATE 50 MG/ML
INJECTION INTRAVENOUS AS NEEDED
Status: DISCONTINUED | OUTPATIENT
Start: 2024-06-04 | End: 2024-06-04 | Stop reason: SURG

## 2024-06-04 RX ORDER — METOCLOPRAMIDE HYDROCHLORIDE 5 MG/ML
10 INJECTION INTRAMUSCULAR; INTRAVENOUS EVERY 8 HOURS PRN
Status: DISCONTINUED | OUTPATIENT
Start: 2024-06-04 | End: 2024-06-05

## 2024-06-04 RX ORDER — SODIUM CHLORIDE, SODIUM LACTATE, POTASSIUM CHLORIDE, CALCIUM CHLORIDE 600; 310; 30; 20 MG/100ML; MG/100ML; MG/100ML; MG/100ML
INJECTION, SOLUTION INTRAVENOUS CONTINUOUS
Status: DISCONTINUED | OUTPATIENT
Start: 2024-06-04 | End: 2024-06-05

## 2024-06-04 RX ORDER — ROCURONIUM BROMIDE 10 MG/ML
INJECTION, SOLUTION INTRAVENOUS AS NEEDED
Status: DISCONTINUED | OUTPATIENT
Start: 2024-06-04 | End: 2024-06-04 | Stop reason: SURG

## 2024-06-04 RX ORDER — LIDOCAINE HYDROCHLORIDE 10 MG/ML
INJECTION, SOLUTION EPIDURAL; INFILTRATION; INTRACAUDAL; PERINEURAL AS NEEDED
Status: DISCONTINUED | OUTPATIENT
Start: 2024-06-04 | End: 2024-06-04 | Stop reason: SURG

## 2024-06-04 RX ORDER — GLYCOPYRROLATE 0.2 MG/ML
INJECTION, SOLUTION INTRAMUSCULAR; INTRAVENOUS AS NEEDED
Status: DISCONTINUED | OUTPATIENT
Start: 2024-06-04 | End: 2024-06-04 | Stop reason: SURG

## 2024-06-04 RX ORDER — ACETAMINOPHEN 500 MG
1000 TABLET ORAL ONCE AS NEEDED
Status: COMPLETED | OUTPATIENT
Start: 2024-06-04 | End: 2024-06-04

## 2024-06-04 RX ORDER — KETOROLAC TROMETHAMINE 30 MG/ML
INJECTION, SOLUTION INTRAMUSCULAR; INTRAVENOUS AS NEEDED
Status: DISCONTINUED | OUTPATIENT
Start: 2024-06-04 | End: 2024-06-04 | Stop reason: SURG

## 2024-06-04 RX ORDER — HYDROCODONE BITARTRATE AND ACETAMINOPHEN 5; 325 MG/1; MG/1
2 TABLET ORAL EVERY 4 HOURS PRN
Status: DISCONTINUED | OUTPATIENT
Start: 2024-06-04 | End: 2024-06-05

## 2024-06-04 RX ORDER — ONDANSETRON 2 MG/ML
4 INJECTION INTRAMUSCULAR; INTRAVENOUS EVERY 6 HOURS PRN
Status: DISCONTINUED | OUTPATIENT
Start: 2024-06-04 | End: 2024-06-05

## 2024-06-04 RX ORDER — MEPERIDINE HYDROCHLORIDE 25 MG/ML
12.5 INJECTION INTRAMUSCULAR; INTRAVENOUS; SUBCUTANEOUS AS NEEDED
Status: DISCONTINUED | OUTPATIENT
Start: 2024-06-04 | End: 2024-06-05

## 2024-06-04 RX ORDER — HYDROCODONE BITARTRATE AND ACETAMINOPHEN 5; 325 MG/1; MG/1
2 TABLET ORAL ONCE AS NEEDED
Status: COMPLETED | OUTPATIENT
Start: 2024-06-04 | End: 2024-06-04

## 2024-06-04 RX ORDER — HYDROCODONE BITARTRATE AND ACETAMINOPHEN 5; 325 MG/1; MG/1
1 TABLET ORAL ONCE AS NEEDED
Status: COMPLETED | OUTPATIENT
Start: 2024-06-04 | End: 2024-06-04

## 2024-06-04 RX ORDER — MORPHINE SULFATE 2 MG/ML
2 INJECTION, SOLUTION INTRAMUSCULAR; INTRAVENOUS
Status: DISCONTINUED | OUTPATIENT
Start: 2024-06-04 | End: 2024-06-05

## 2024-06-04 RX ORDER — HYDROMORPHONE HYDROCHLORIDE 1 MG/ML
0.6 INJECTION, SOLUTION INTRAMUSCULAR; INTRAVENOUS; SUBCUTANEOUS EVERY 5 MIN PRN
Status: ACTIVE | OUTPATIENT
Start: 2024-06-04 | End: 2024-06-05

## 2024-06-04 RX ORDER — SODIUM CHLORIDE 9 MG/ML
INJECTION, SOLUTION INTRAVENOUS CONTINUOUS
Status: DISCONTINUED | OUTPATIENT
Start: 2024-06-04 | End: 2024-06-05

## 2024-06-04 RX ADMIN — DEXAMETHASONE SODIUM PHOSPHATE 4 MG: 4 MG/ML VIAL (ML) INJECTION at 15:57:00

## 2024-06-04 RX ADMIN — ESMOLOL HYDROCHLORIDE 60 MG: 10 INJECTION INTRAVENOUS at 15:39:00

## 2024-06-04 RX ADMIN — LIDOCAINE HYDROCHLORIDE 20 MG: 10 INJECTION, SOLUTION EPIDURAL; INFILTRATION; INTRACAUDAL; PERINEURAL at 15:41:00

## 2024-06-04 RX ADMIN — KETOROLAC TROMETHAMINE 30 MG: 30 INJECTION, SOLUTION INTRAMUSCULAR; INTRAVENOUS at 17:04:00

## 2024-06-04 RX ADMIN — ONDANSETRON 4 MG: 2 INJECTION INTRAMUSCULAR; INTRAVENOUS at 17:04:00

## 2024-06-04 RX ADMIN — EPHEDRINE SULFATE 10 MG: 50 INJECTION INTRAVENOUS at 16:07:00

## 2024-06-04 RX ADMIN — NEOSTIGMINE METHYLSULFATE 3 MG: 1 INJECTION, SOLUTION INTRAVENOUS at 17:06:00

## 2024-06-04 RX ADMIN — SODIUM CHLORIDE: 9 INJECTION, SOLUTION INTRAVENOUS at 17:41:00

## 2024-06-04 RX ADMIN — EPHEDRINE SULFATE 5 MG: 50 INJECTION INTRAVENOUS at 16:26:00

## 2024-06-04 RX ADMIN — EPHEDRINE SULFATE 5 MG: 50 INJECTION INTRAVENOUS at 17:05:00

## 2024-06-04 RX ADMIN — SODIUM CHLORIDE: 9 INJECTION, SOLUTION INTRAVENOUS at 16:07:00

## 2024-06-04 RX ADMIN — EPHEDRINE SULFATE 5 MG: 50 INJECTION INTRAVENOUS at 16:23:00

## 2024-06-04 RX ADMIN — SODIUM CHLORIDE: 9 INJECTION, SOLUTION INTRAVENOUS at 15:33:00

## 2024-06-04 RX ADMIN — GLYCOPYRROLATE 0.6 MG: 0.2 INJECTION, SOLUTION INTRAMUSCULAR; INTRAVENOUS at 17:06:00

## 2024-06-04 RX ADMIN — ROCURONIUM BROMIDE 50 MG: 10 INJECTION, SOLUTION INTRAVENOUS at 15:41:00

## 2024-06-04 NOTE — PROGRESS NOTES
Received pt from OR ~1745. CT/shunt series done, pending results. Neuro checks intact, oriented x4. No c/o pain or nausea at this time. Adequate UOP from tran. Diet advanced to clear liquids. Family and pt updated on POC.

## 2024-06-04 NOTE — OPERATIVE REPORT
MRN:  [id number]  Patient Name:  Edis Olesn  YOB: 1950     DATE OF OPERATION:     06/04/24     PREOPERATIVE DIAGNOSIS:  Normal pressure hydrocephalus.     POSTOPERATIVE DIAGNOSIS:  Normal pressure hydrocephalus.     PROCEDURE:  Right-sided occipital ventriculoperitoneal shunt with laparoscopic placement of the peritoneal catheter.  Intraoperative neuronavigation     SURGEON:  Gerardo Miller MD    CO-SURGEON:  Stephanie Abebe MD     ASSISTANT:  None     ANESTHESIA:  General.     BLOOD LOSS:  20 mL     IMPLANTS:  Medtronic Strata II valve set at 1.5 with an antibiotic-impregnated ventricular catheter and a peritoneal catheter.     DRAINS:  None.     SPECIMENS:  None.     INDICATION FOR TREATMENT:  The patient is a 74 year old male with normal pressure hydrocephalus responsive to a high volume lumbar puncture trial. After a detailed discussion of the risks, benefits, alternatives, goals, and expectations of ventriculoperitoneal shunt insertion, the patient  wished to proceed with surgery.        DESCRIPTION OF PROCEDURE:  The patient was brought to the operating room and general anesthesia was induced with an endotracheal tube.  The head was turned to expose the right retroauricular area.  We clipped hair from here to make a pathway for the tunneling.  We used neuronavigation for this procedure and this was setup before the surgery.  The area was prepped with DuraPrep and ChloraPrep and draped in sterile fashion.  We then made an inverted U incision 5 cm above and posterior to the top of the pinna.  We reflected the cutaneous flap inferiorly.  Hemostasis was achieved. We then used Bovie cautery over the planned carlie hole area.  We then made a carlie hole.  We opened the dura with an 11 blade and bipolar.  We then tunneled the catheter to the right subcostal area per the discretion of Dr. Abebe.  He got laparoscopic access to the abdominal, into peritoneal cavity.  The valve was confirmed to be  the correct orientation prior to placement. We tied the valve to the catheter and it was pulled into pocket that we have created.  We then stereotactically placed the catheter into the ventricular system.  We got brisk return of CSF.  This was cut at 10 cm and was about 8 cm at the skull.  This was tied to the valve with a silk suture.  This suture was stitched to the periosteum with an eye'd needle. We saw CSF from the distal peritoneal tip.  Dr. Abebe placed the peritoneal catheter into the intraperitoneal cavity.  We saw CSF from the distal peritoneal tip within the peritoneal cavity as well.  Meticulous hemostasis was achieved and the wound was copiously irrigated. A piece of surgicel was placed within the burrhole. We closed with 2-0 Vicryl for galea, followed by staples for skin and placed antibiotic ointment on this.  The laparoscopic incisions were closed by Dr. Abebe, which will be dictated separately.  Counts were correct at the end of the surgery. The patient was returned to a neutral position, extubated by anesthesiology, and transferred to the ICU in stable neurological condition.

## 2024-06-04 NOTE — ANESTHESIA POSTPROCEDURE EVALUATION
[facility]    Edis Olsen Patient Status:  Inpatient   Age/Gender 74 year old male MRN [id number]   Location [facility] 6NE-A Attending Gerardo Miller MD   Hosp Day # 0 PCP Patric Hernandez MD       Anesthesia Post-op Note    RIGHT VENTRICULOPERITONEAL SHUNT INSERTION (HEIFERMAN) DIAGNOSTIC LAPAROSCOPY (NOURALLAH)    Procedure Summary       Date: 06/04/24 Room / Location:  MAIN OR [address] [place] [address] MAIN OR    Anesthesia Start: 1530 Anesthesia Stop: 1741    Procedures:       RIGHT VENTRICULOPERITONEAL SHUNT INSERTION (HEIFERMAN) DIAGNOSTIC LAPAROSCOPY (NOURALLAH) (Right: Head)      NAVIGATION SYSTEM NEURO (Right)      DIAGNOSTIC LAPAROSCOPY-GENERAL (Abdomen) Diagnosis:       NPH (normal pressure hydrocephalus) (HCC)      (NPH (normal pressure hydrocephalus) (HCC) [G91.2])    Surgeons: Gerardo Miller MD; Stephanie Abebe MD Anesthesiologist: Patric Patten MD    Anesthesia Type: general ASA Status: 3            Anesthesia Type: general    Vitals Value Taken Time   /62 06/04/24 1741   Temp 96.5 06/04/24 1741   Pulse 91 06/04/24 1741   Resp 12 06/04/24 1741   SpO2 99 % 06/04/24 1741   Vitals shown include unfiled device data.    Patient Location: ICU    Anesthesia Type: general    Airway Patency: patent    Postop Pain Control: adequate    Mental Status: preanesthetic baseline    Nausea/Vomiting: none    Cardiopulmonary/Hydration status: stable euvolemic    Complications: no apparent anesthesia related complications    Postop vital signs: stable    Dental Exam: Unchanged from Preop    Patient to be discharged from PACU when criteria met.

## 2024-06-04 NOTE — CONSULTS
[facility]  CONSULT     Edis Olsen Patient Status:  Inpatient    3/14/1950 MRN [id number]   Location [facility] 6NE-A Attending Gerardo Miller MD   Hosp Day # 0 PCP Patric Hernandez MD     Reason for consult: medical management    Requested by: Dr. Miller    Subjective:   History of Present Illness:     Edis Olsen is a 74 year old male with medical history of hyperlipidemia, Psoriasis, SCC, LUIS and NPH who present today for planned  shunt.  He had a high volume LP with improvement in his symptoms . He is now s/p  shunt insertion and diagnostic laparoscopy.  He tolerated the procedure well.     History/Other:    Past Medical History:  Past Medical History:    High cholesterol    Hyperlipidemia    life style changes    Osteoarthritis    Plantar fasciitis    steroid injection x2    Psoriasis    SCC (squamous cell carcinoma)    Sleep apnea    MILD, NO MACHINE/TREATMENT    Visual impairment    GLASSES     Past Surgical History:   Past Surgical History:   Procedure Laterality Date    Colonoscopy      Colonoscopy N/A 2023    Procedure: COLONOSCOPY;  Surgeon: Franki Lucero MD;  Location: [facility]    Other surgical history  2013    Lt thumb 4 sutures      Family History:   Family History   Problem Relation Age of Onset    Kidney Disease Father         kidney failure (cause of death)    Other (Other) Mother         natural causes (cause of death)     Social History:    reports that he has quit smoking. He has never used smokeless tobacco. He reports current alcohol use. He reports that he does not use drugs.     Allergies: No Known Allergies    Medications:    No current facility-administered medications on file prior to encounter.     Current Outpatient Medications on File Prior to Encounter   Medication Sig Dispense Refill    atorvastatin 10 MG Oral Tab Take 1 tablet (10 mg total) by mouth nightly. 90 tablet 1    latanoprost 0.005 % Ophthalmic Solution Place 1 drop  into both eyes nightly.  1    clobetasol 0.05 % External Ointment Use bid to psoriasis 60 g 5       Review of Systems:   A comprehensive review of systems was completed.    Pertinent positives and negatives noted in the HPI.    Objective:   Physical Exam:    BP (!) 138/111 (BP Location: Left arm)   Pulse 86   Temp 96.7 °F (35.9 °C) (Temporal)   Resp 11   Ht 5' 10\" (1.778 m)   Wt 183 lb (83 kg)   SpO2 100%   BMI 26.26 kg/m²   General: No acute distress, Alert  Respiratory: No rhonchi, no wheezes  Cardiovascular: S1, S2. Regular rate and rhythm  Abdomen: Soft, NT/ND, +BS  Neuro: No new focal deficits  Extremities: No edema      Results:    Labs:      Labs Last 24 Hours:  No results for input(s): \"WBC\", \"HGB\", \"MCV\", \"PLT\", \"BAND\", \"INR\" in the last 168 hours.    Invalid input(s): \"LYM#\", \"MONO#\", \"BASOS#\", \"EOSIN#\"    No results for input(s): \"GLU\", \"BUN\", \"CREATSERUM\", \"GFRAA\", \"GFRNAA\", \"CA\", \"ALB\", \"NA\", \"K\", \"CL\", \"CO2\", \"ALKPHO\", \"AST\", \"ALT\", \"BILT\", \"TP\" in the last 168 hours.    No results for input(s): \"PTP\", \"INR\" in the last 168 hours.    No results for input(s): \"TROP\", \"CK\" in the last 168 hours.      Imaging: Imaging data reviewed in Epic.    Assessment & Plan:      #NPH  -s/p  shunt 6/4  -per NS and Gen Sx    #Hyperlipidemia  -resume PTA statin when cleared with NS, has been on hold for the past 2 weeks      Plan of care discussed with patient and SHANNA Davis MD  6/4/2024    The 21st Century Cures Act makes medical notes like these available to patients in the interest of transparency. Please be advised this is a medical document. Medical documents are intended to carry relevant information, facts as evident, and the clinical opinion of the practitioner. The medical note is intended as peer to peer communication and may appear blunt or direct. It is written in medical language and may contain abbreviations or verbiage that are unfamiliar.

## 2024-06-04 NOTE — ANESTHESIA PREPROCEDURE EVALUATION
PRE-OP EVALUATION    Patient Name: Edis Olsen    Admit Diagnosis: NPH (normal pressure hydrocephalus) ([facility]) [G91.2]    Pre-op Diagnosis: NPH (normal pressure hydrocephalus) (HCC) [G91.2]    RIGHT VENTRICULOPERITONEAL SHUNT INSERTION (JOANNE) DIAGNOSTIC LAPAROSCOPY (JOSEPHINE)    Anesthesia Procedure: RIGHT VENTRICULOPERITONEAL SHUNT INSERTION (JOANNE) DIAGNOSTIC LAPAROSCOPY (NOMAVIS) (Right)  NAVIGATION SYSTEM NEURO (Right)  DIAGNOSTIC LAPAROSCOPY-GENERAL    Surgeons and Role:  Panel 1:     * Gearrdo Miller MD - Primary  Panel 2:     * Stephanie Abebe MD - Primary    Pre-op vitals reviewed.  Temp: 98 °F (36.7 °C)  Pulse: 71  Resp: 18  BP: 156/95  SpO2: 100 %  Body mass index is 26.26 kg/m².    Current medications reviewed.  Hospital Medications:   acetaminophen (Tylenol Extra Strength) tab 1,000 mg  1,000 mg Oral Once    sodium chloride 0.9% infusion   Intravenous Continuous    ceFAZolin (Ancef) 2g in 10mL IV syringe premix  2 g Intravenous Once       Outpatient Medications:     Medications Prior to Admission   Medication Sig Dispense Refill Last Dose    atorvastatin 10 MG Oral Tab Take 1 tablet (10 mg total) by mouth nightly. 90 tablet 1 5/21/2024    latanoprost 0.005 % Ophthalmic Solution Place 1 drop into both eyes nightly.  1 6/3/2024 at 1900    clobetasol 0.05 % External Ointment Use bid to psoriasis 60 g 5 More than a month       Allergies: Patient has no known allergies.      Anesthesia Evaluation  Patient Active Problem List   Diagnosis    Plantar fasciitis    Psoriasis and similar disorder    Hyperlipidemia    Screen for colon cancer    Normal pressure hydrocephalus (HCC)    Preop examination        Past Medical History:    High cholesterol    Hyperlipidemia    life style changes    Osteoarthritis    Plantar fasciitis    steroid injection x2    Psoriasis    SCC (squamous cell carcinoma)    Sleep apnea    MILD, NO MACHINE/TREATMENT    Visual impairment    GLASSES        Past Surgical  History:   Procedure Laterality Date    Colonoscopy  1999    Colonoscopy N/A 1/30/2023    Procedure: COLONOSCOPY;  Surgeon: Franki Lucero MD;  Location: [facility]    Other surgical history  2013    Lt thumb 4 sutures     Social History     Socioeconomic History    Marital status:    Tobacco Use    Smoking status: Former    Smokeless tobacco: Never   Vaping Use    Vaping status: Never Used   Substance and Sexual Activity    Alcohol use: Yes     Comment: occ, wine    Drug use: No    Sexual activity: Not Currently     Partners: Female   Other Topics Concern    Caffeine Concern Yes     Comment: soda, occ    Stress Concern No    Weight Concern No    Exercise No    Seat Belt Yes    Pt has a pacemaker No    Pt has a defibrillator No    Reaction to local anesthetic No     History   Drug Use No     Available pre-op labs reviewed.  Lab Results   Component Value Date    WBC 4.6 04/29/2024    RBC 4.65 04/29/2024    HGB 14.4 04/29/2024    HCT 42.8 04/29/2024    MCV 92.0 04/29/2024    MCH 31.0 04/29/2024    MCHC 33.6 04/29/2024    RDW 12.1 04/29/2024    .0 04/29/2024     Lab Results   Component Value Date     04/29/2024    K 4.4 04/29/2024     04/29/2024    CO2 28.0 04/29/2024    BUN 19 04/29/2024    CREATSERUM 1.15 04/29/2024    GLU 90 04/29/2024    CA 9.8 04/29/2024     Lab Results   Component Value Date    INR 1.05 05/23/2024         Airway      Mallampati: II  Mouth opening: >3 FB  TM distance: 4 - 6 cm  Neck ROM: full Cardiovascular      Rhythm: regular  Rate: normal     Dental    Dentition appears grossly intact  Dental appliance(s): upper dentures and partials       Pulmonary    Pulmonary exam normal.  Breath sounds clear to auscultation bilaterally.               Other findings              ASA: 3   Plan: general  NPO status verified and     Post-procedure pain management plan discussed with surgeon and patient.    Comment:  I explained intrinsic risks of general anesthesia, including  nausea, dental damage, sore throat, mouth injury,and hoarseness from airway management.  All questions were answered and understanding was demonstrated of risks.  Informed permission was obtained to proceed as documented in the signed consent form.      Plan/risks discussed with: patient  Use of blood product(s) discussed with: patient    Consented to blood products.          Present on Admission:  **None**

## 2024-06-04 NOTE — ANESTHESIA PROCEDURE NOTES
Airway  Date/Time: 6/4/2024 3:43 PM  Urgency: elective      General Information and Staff    Patient location during procedure: OR  Anesthesiologist: Patric Patten MD  Performed: anesthesiologist   Performed by: Patric Patten MD  Authorized by: Patric Patten MD      Indications and Patient Condition  Indications for airway management: anesthesia  Spontaneous Ventilation: absent  Sedation level: deep  Preoxygenated: yes  Patient position: sniffing  Mask difficulty assessment: 1 - vent by mask    Final Airway Details  Final airway type: endotracheal airway      Successful airway: ETT  Cuffed: yes   Successful intubation technique: direct laryngoscopy  Facilitating devices/methods: intubating stylet  Endotracheal tube insertion site: oral  Blade: Goran  Blade size: #4  ETT size (mm): 7.0    Cormack-Lehane Classification: grade IIA - partial view of glottis  Placement verified by: capnometry   Measured from: teeth  ETT to teeth (cm): 23  Number of attempts at approach: 1

## 2024-06-04 NOTE — H&P
[facility]  Report of  Surgical Consultation with History and Physical Exam    Edis Olsen Patient Status:  Inpatient    3/14/1950 MRN [id number]   Location [facility] PRE OP HOLDING Attending Gerardo Miller MD   Hosp Day # 0 PCP Patric Hernandez MD     Referring Provider:   Gerardo Miller MD        Reason for Surgical Consultation:  Normal pressure hydrocephalus    History of Present Illness:  Edis Olsen is a 74 year old male who presents with normal pressure hydrocephalus. He is here for placement of  shunt. He has no prior abdominal surgery history.     History:  Past Medical History:    High cholesterol    Hyperlipidemia    life style changes    Osteoarthritis    Plantar fasciitis    steroid injection x2    Psoriasis    SCC (squamous cell carcinoma)    Sleep apnea    MILD, NO MACHINE/TREATMENT    Visual impairment    GLASSES     Past Surgical History:   Procedure Laterality Date    Colonoscopy      Colonoscopy N/A 2023    Procedure: COLONOSCOPY;  Surgeon: Franki Lucero MD;  Location: [facility]    Other surgical history  2013    Lt thumb 4 sutures     Family History   Problem Relation Age of Onset    Kidney Disease Father         kidney failure (cause of death)    Other (Other) Mother         natural causes (cause of death)      reports that he has quit smoking. He has never used smokeless tobacco. He reports current alcohol use. He reports that he does not use drugs.    Allergies:  No Known Allergies    Medications:    Current Facility-Administered Medications:     [Transfer Hold] acetaminophen (Tylenol Extra Strength) tab 1,000 mg, 1,000 mg, Oral, Once    sodium chloride 0.9% infusion, , Intravenous, Continuous    ceFAZolin (Ancef) 2g in 10mL IV syringe premix, 2 g, Intravenous, Once    Review of Systems:  Pertinent items are noted in HPI.  The review of systems was negative other than the above listed history of present illness and past medical history including  the HEENT, Heart, Lungs, GI, , Neuro, Musculoskeletal, Hematologic, Endocrine and Psych.     Physical Exam:  Blood pressure (!) 156/95, pulse 71, temperature 98 °F (36.7 °C), temperature source Temporal, resp. rate 18, height 70\", weight 183 lb (83 kg), SpO2 100%.    General: Alert, orientated x3.  Cooperative.  Not in apparent distress.    HEENT: Exam is unremarkable.  Without scleral icterus.  Mucous membranes are moist. EOM are WNL.    Neck: No tenderness to palpitation.  Full range of motion to flexion and extension, lateral rotation and lateral flexion of cervical spine.  No JVD. Supple.     Lungs: Bilateral chest rise. Good excursion of the diaphragms. No secondary use of accessory respiratory musculature.    Cardiac: Regular rate and rhythm. No murmur.    Abdomen:  soft, non tender, non distended, no rebound tenderness or guarding,    Extremities:  No lower extremity edema noted.  Without clubbing or cyanosis.      Skin: Normal texture and turgor.      Laboratory Data and Relevant X-rays:  No results for input(s): \"RBC\", \"HGB\", \"HCT\", \"MCV\", \"MCH\", \"MCHC\", \"RDW\", \"NEPRELIM\", \"WBC\", \"PLT\" in the last 168 hours.    No results for input(s): \"GLU\", \"BUN\", \"CREATSERUM\", \"GFRAA\", \"GFRNAA\", \"CA\", \"ALB\", \"NA\", \"K\", \"CL\", \"CO2\", \"ALKPHO\", \"AST\", \"ALT\", \"BILT\", \"TP\" in the last 168 hours.      No results for input(s): \"PTP\", \"INR\", \"PTT\" in the last 168 hours.      Impression/Plan:  Patient Active Problem List   Diagnosis    Plantar fasciitis    Psoriasis and similar disorder    Hyperlipidemia    Screen for colon cancer    Normal pressure hydrocephalus (HCC)    Preop examination       74 year old male with normal pressure hydrocephalus presents for  shunt placement. I am performing the abdominal portion of the  shunt placement. Discussed with the patient the risks and benefits of the procedure. He understands and agrees to proceed. All questions were answered.       Stephanie Abebe MD  6/4/2024  2:49 PM

## 2024-06-04 NOTE — CONSULTS
[facility]   NEUROCRITICAL CARE   CONSULT NOTE    Admission date: 6/4/2024  Reason for Consult: s/p VPS  Chief Complaint: Gait instability  ________________________________________________________________    History     History of Presenting Illness  74 year old male with PMH of HLD and SCC who presents status post  shunt placement.  Per EMR, patient noted improvement in gait and balance after high-volume LP so presented now for the above.  IntraOp course uncomplicated. No complaints post op.     Past Medical History:    High cholesterol    Hyperlipidemia    life style changes    Osteoarthritis    Plantar fasciitis    steroid injection x2    Psoriasis    SCC (squamous cell carcinoma)    Sleep apnea    MILD, NO MACHINE/TREATMENT    Visual impairment    GLASSES     Past Surgical History:   Procedure Laterality Date    Colonoscopy  1999    Colonoscopy N/A 1/30/2023    Procedure: COLONOSCOPY;  Surgeon: Franki Lucero MD;  Location: [facility]    Other surgical history  2013    Lt thumb 4 sutures     Social History     Socioeconomic History    Marital status:    Tobacco Use    Smoking status: Former    Smokeless tobacco: Never   Vaping Use    Vaping status: Never Used   Substance and Sexual Activity    Alcohol use: Yes     Comment: occ, wine    Drug use: No    Sexual activity: Not Currently     Partners: Female   Other Topics Concern    Caffeine Concern Yes     Comment: soda, occ    Stress Concern No    Weight Concern No    Exercise No    Seat Belt Yes    Pt has a pacemaker No    Pt has a defibrillator No    Reaction to local anesthetic No     Family History   Problem Relation Age of Onset    Kidney Disease Father         kidney failure (cause of death)    Other (Other) Mother         natural causes (cause of death)     Allergies No Known Allergies    Home Meds  Current Outpatient Medications   Medication Instructions    atorvastatin (LIPITOR) 10 mg, Oral, Nightly    clobetasol 0.05 %  External Ointment Use bid to psoriasis    latanoprost 0.005 % Ophthalmic Solution Place 1 drop into both eyes nightly.     Scheduled Meds:  Continuous Infusions:   sodium chloride 20 mL/hr at 06/04/24 1530    lactated ringers       PRN Meds:  lactated ringers    acetaminophen **OR** HYDROcodone-acetaminophen **OR** HYDROcodone-acetaminophen    atropine    labetalol    naloxone    meperidine    fentaNYL **OR** fentaNYL    HYDROmorphone **OR** HYDROmorphone **OR** HYDROmorphone    ondansetron    metoclopramide    HYDROcodone-acetaminophen    HYDROcodone-acetaminophen    morphINE    OBJECTIVE   VITAL SIGNS:   Temp:  [98 °F (36.7 °C)] 98 °F (36.7 °C)  Pulse:  [71] 71  Resp:  [18] 18  BP: (156)/(95) 156/95  SpO2:  [100 %] 100 %    INTAKE/OUTPUT:  Intake/Output Summary (Last 24 hours) at 6/4/2024 1741  Last data filed at 6/4/2024 1741  Gross per 24 hour   Intake 1800 ml   Output 220 ml   Net 1580 ml     PHYSICAL EXAM:  GENERAL APPEARANCE: Patient resting comfortably in bed, NAD  HEENT: Normocephalic, atraumatic.   LUNGS: Normal respiratory rate and effort   HEART: Regular rate and rhythm   ABDOMEN: Soft. No tenderness, guarding, or rebound.   EXTREMITIES: No cyanosis, clubbing, or edema.    NEUROLOGIC:    Mental Status:  A&O x 3, Follows simple commands, following more complex commands.  No obvious aphasia or dysarthria  Cranial nerves: PERRL.  Visual fields full.  EOMI.  Face symmetric with normal movement bilaterally.  Hearing grossly intact. Tongue midline with normal movements.   Motor: Drift:  Absent; Motor exam is 5 out of 5 in all extremities bilaterally  Sensation: Intact to light touch bilaterally, trouble at times with recognizing proper side.  Intact throughout to noxious stim.  Cerebellar: Normal Finger-To-Nose test, no obvious dysmetria    LABORATORY DATA:  Last 24 hour labs were reviewed in detail.  No results for input(s): \"NA\", \"K\", \"CL\", \"CO2\", \"GLU\", \"BUN\", \"CREATSERUM\" in the last 168 hours.  No results  for input(s): \"WBC\", \"HGB\", \"PLT\" in the last 168 hours.  No results for input(s): \"TOTALPROTEIN\", \"ALBUMIN\", \"ALT\", \"AST\" in the last 168 hours.    Invalid input(s): \"TOTALBILIRUBIN\", \"ALKPHOSPHATE\"  No results for input(s): \"MG\", \"PHOS\" in the last 168 hours.    Radiology:    No results found.  ASSESSMENT/PLAN   74 year old male with PMH of HLD and SCC who presents status post  shunt placement.      Neurological:  NPH   - MRI showing ventriculomegaly, hx of gait instability, urinary urgency but no incontinence    - Improvement noted after high-volume lumbar puncture.    - Now s/p VPS placement   - Shunt series and CT timing per nsg    - Pain control and close neuro monitoring overnight    Cardiovascular:   - SBP goal < 150   - IV labetalol/hydralazine pushes and Nicardipine gtt prn      HLD - Continue home med    Pulmonary: - Satting well on face mask, wean as tolerated, encourage IS     Renal:         - Monitor I&Os          - IVF while NPO    Gastrointestinal:  Nutrition:        - ADAT        - Bowel regimen: ordered prn    Infectious Disease: - Afebrile, continue to monitor     Heme/Onc - No signs of active bleeding, continue to monitor daily     Endocrine: - Accuchecks q6 with SSI prn while NPO    Checklist   - Lines: PIVs   - DVT Prophylaxis: SCDs    - Diet: ADAT   - IVF: Dc when tolerating diet    - Electrolytes: Replete per protocol   - Code Status: FULL    Dispo: CNICU    Greater than 35 minutes of critical care time (exclusive of billable procedures) was administered to manage and/or prevent neurologic instability. This involved direct patient intervention, complex decision making, and/or extensive discussions with the patient, family, and clinical staff.    Laura Arnold MD  Neurocritical Care  [facility]    Disclaimer: This record was dictated using Dragon software. There may be errors due to voice recognition problems that were not realized and corrected during the completion  of the note.

## 2024-06-04 NOTE — H&P
History & Physical Examination    Patient Name: Edis Olsen  MRN: [id number]  CSN: [id number]  YOB: 1950    Diagnosis: NPH    Present Illness:  74 year old male with a PMH of HLD and SCC and NPH with significant improvement in gait and balance after high volume LP. Currently reports still having improvement of his gait since the LP. Denies HA, blurred or double vision, no weakness in his arms or legs. No recent falls. Has been eating well without emesis.      Current Facility-Administered Medications   Medication Dose Route Frequency    acetaminophen (Tylenol Extra Strength) tab 1,000 mg  1,000 mg Oral Once    sodium chloride 0.9% infusion   Intravenous Continuous    ceFAZolin (Ancef) 2g in 10mL IV syringe premix  2 g Intravenous Once       Allergies: No Known Allergies    Past Medical History:    High cholesterol    Hyperlipidemia    life style changes    Osteoarthritis    Plantar fasciitis    steroid injection x2    Psoriasis    SCC (squamous cell carcinoma)    Sleep apnea    MILD, NO MACHINE/TREATMENT    Visual impairment    GLASSES     Past Surgical History:   Procedure Laterality Date    Colonoscopy  1999    Colonoscopy N/A 1/30/2023    Procedure: COLONOSCOPY;  Surgeon: Franki Lucero MD;  Location: [facility]    Other surgical history  2013    Lt thumb 4 sutures     Family History   Problem Relation Age of Onset    Kidney Disease Father         kidney failure (cause of death)    Other (Other) Mother         natural causes (cause of death)     Social History     Tobacco Use    Smoking status: Former    Smokeless tobacco: Never   Substance Use Topics    Alcohol use: Yes     Comment: occ, wine       NEUROLOGICAL:  This patient is alert and orientated x 3.  Speech fluent. Comprehension intact.   PERRLA +3 brisk.  EOMI.  VFF.  Face is symmetrical. Tongue is midline.  Uvula and palate elevate symmetrically. Shoulder shrug normal bilaterally. Remaining CNs GI.  Sensation to light touch is  intact bilaterally.  Motor: No Drift. No arm or leg weakness noted. 5/5 bilaterally. Finger-to-nose coordination is intact.  Gait deferred.        SYSTEM Check if Review is Normal Check if Physical Exam is Normal If not normal, please explain:   HEENT [ x] [x ]    NECK & BACK [ x] [x ]    HEART [x ] [x ]    LUNGS [x ] [x ]    ABDOMEN [x ] [x ]    EXTREMITIES Psoriasis to knees and elbows (stable) Erythematous plaques to knees and elbows      [ x ] I have reviewed the History and Physical done within the last 30 days.  Any changes noted above    To OR for insertion of ventriculoperitoneal shunt with assistance with General Surgery    Discussed with Dr. Angela Rider PA-C  [facility]  6/4/2024 12:42 PM

## 2024-06-05 VITALS
DIASTOLIC BLOOD PRESSURE: 84 MMHG | WEIGHT: 183 LBS | OXYGEN SATURATION: 100 % | HEIGHT: 70 IN | SYSTOLIC BLOOD PRESSURE: 150 MMHG | BODY MASS INDEX: 26.2 KG/M2 | RESPIRATION RATE: 21 BRPM | HEART RATE: 74 BPM | TEMPERATURE: 98 F

## 2024-06-05 LAB
ANION GAP SERPL CALC-SCNC: 9 MMOL/L (ref 0–18)
BUN BLD-MCNC: 17 MG/DL (ref 9–23)
CALCIUM BLD-MCNC: 8.5 MG/DL (ref 8.5–10.1)
CHLORIDE SERPL-SCNC: 109 MMOL/L (ref 98–112)
CO2 SERPL-SCNC: 22 MMOL/L (ref 21–32)
CREAT BLD-MCNC: 1.18 MG/DL
EGFRCR SERPLBLD CKD-EPI 2021: 64 ML/MIN/1.73M2 (ref 60–?)
ERYTHROCYTE [DISTWIDTH] IN BLOOD BY AUTOMATED COUNT: 11.8 %
GLUCOSE BLD-MCNC: 130 MG/DL (ref 70–99)
HCT VFR BLD AUTO: 38.8 %
HGB BLD-MCNC: 13.1 G/DL
MCH RBC QN AUTO: 31.6 PG (ref 26–34)
MCHC RBC AUTO-ENTMCNC: 33.8 G/DL (ref 31–37)
MCV RBC AUTO: 93.5 FL
OSMOLALITY SERPL CALC.SUM OF ELEC: 293 MOSM/KG (ref 275–295)
PLATELET # BLD AUTO: 164 10(3)UL (ref 150–450)
POTASSIUM SERPL-SCNC: 4.4 MMOL/L (ref 3.5–5.1)
RBC # BLD AUTO: 4.15 X10(6)UL
SODIUM SERPL-SCNC: 140 MMOL/L (ref 136–145)
WBC # BLD AUTO: 7.1 X10(3) UL (ref 4–11)

## 2024-06-05 PROCEDURE — 99232 SBSQ HOSP IP/OBS MODERATE 35: CPT | Performed by: HOSPITALIST

## 2024-06-05 PROCEDURE — 99291 CRITICAL CARE FIRST HOUR: CPT | Performed by: OTHER

## 2024-06-05 RX ORDER — HYDROCODONE BITARTRATE AND ACETAMINOPHEN 5; 325 MG/1; MG/1
1-2 TABLET ORAL EVERY 4 HOURS PRN
Qty: 20 TABLET | Refills: 0 | Status: SHIPPED | OUTPATIENT
Start: 2024-06-05

## 2024-06-05 RX ORDER — ATORVASTATIN CALCIUM 10 MG/1
10 TABLET, FILM COATED ORAL NIGHTLY
Status: DISCONTINUED | OUTPATIENT
Start: 2024-06-05 | End: 2024-06-05

## 2024-06-05 NOTE — OCCUPATIONAL THERAPY NOTE
OCCUPATIONAL THERAPY EVALUATION - INPATIENT    Room Number: 6615/6615-A  Evaluation Date: 6/5/2024     Type of Evaluation: Initial  Presenting Problem: NPH s/p  shunt 6/4    Physician Order: IP Consult to Occupational Therapy  Reason for Therapy:  ADL/IADL Dysfunction and Discharge Planning      OCCUPATIONAL THERAPY ASSESSMENT   Patient is a 74 year old male admitted on 6/4/2024 with Presenting Problem: NPH s/p  shunt 6/4. Co-Morbidities : HLD, SCC, NPH, gait instability, psoriasis  Patient is currently functioning at baseline with  all ADL .  Prior to admission, patient's baseline is independent.  Patient met all OT goals at independent level.  Patient reports no further questions/concerns at this time.           WEIGHT BEARING RESTRICTION  Weight Bearing Restriction: None                Recommendations for nursing staff:   Transfers: independent  Toileting location: Toilet    EVALUATION SESSION:  Patient at start of session: seated  FUNCTIONAL TRANSFER ASSESSMENT  Sit to Stand: Chair  Chair: Independent    BED MOBILITY  Supine to Sit : Not tested  Sit to Supine (OT): Not Tested           COGNITION  Overall Cognitive Status:  WFL - within functional limits  COGNITION ASSESSMENTS       Upper Extremity:   ROM: within functional limits   Strength: is within functional limits   Coordination:  Gross motor:   Fine motor: intact  Sensation: Light touch:  intact    EDUCATION PROVIDED  Patient: Role of Occupational Therapy; Plan of Care; Posture/Positioning; Surgical Precautions  Patient's Response to Education: Returned Demonstration; Verbalized Understanding    Equipment used: none  Therapist comments: independent with sit to stand transfers, ambulation. Intact fine motor, visual perception.     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  Pt mentioned, \"I was wobbly before, but not now.\"    SUBJECTIVE  See above    PAIN ASSESSMENT  Ratin          OBJECTIVE  Precautions:  (scalp incision, SBP <150)  Fall Risk: Standard fall risk    WEIGHT BEARING RESTRICTION  Weight Bearing Restriction: None                AM-PAC ‘6-Clicks’ Inpatient Daily Activity Short Form  -   Putting on and taking off regular lower body clothing?: None  -   Bathing (including washing, rinsing, drying)?: None  -   Toileting, which includes using toilet, bedpan or urinal? : None  -   Putting on and taking off regular upper body clothing?: None  -   Taking care of personal grooming such as brushing teeth?: None  -   Eating meals?: None    AM-PAC Score:  Score: 24  Approx Degree of Impairment: 0%  Standardized Score (AM-PAC Scale): 57.54      ADDITIONAL TESTS     NEUROLOGICAL FINDINGS  Coordination - Finger to Nose: Symmetrical  Coordination - Rapid Alternating Movement: Symmetrical  Coordination - Finger Opposition: Symmetrical       PLAN   Patient has been evaluated and presents with no skilled Occupational Therapy needs at this time.  Patient discharged from Occupational Therapy services.  Please re-order if a new functional limitation presents during this admission.      Patient Evaluation Complexity Level:   Occupational Profile/Medical History LOW - Brief history including review of medical or therapy records    Specific performance deficits impacting engagement in ADL/IADL LOW  1 - 3 performance deficits    Client Assessment/Performance Deficits LOW - No comorbidities nor modifications of tasks    Clinical Decision Making LOW - Analysis of occupational profile, problem-focused assessments, limited treatment  options    Overall Complexity LOW     OT Session Time: 17 minutes

## 2024-06-05 NOTE — DISCHARGE SUMMARY
[facility]  Discharge Summary    Edis Olsen Patient Status:  Inpatient    3/14/1950 MRN [id number]   Location [facility] 6NE-A Attending Gerardo Miller MD   Hosp Day # 1 PCP Patric Hernandez MD     Date of Admission: 2024    Date of Discharge: 24    Admitting Diagnosis: NPH (normal pressure hydrocephalus) (HCC) [G91.2]    Discharge Diagnosis:   Patient Active Problem List   Diagnosis    Plantar fasciitis    Psoriasis and similar disorder    Hyperlipidemia    Screen for colon cancer    NPH (normal pressure hydrocephalus) (HCC)    Preop examination    SCC (squamous cell carcinoma)    Presence of ventriculopleural shunt    NPH (normal pressure hydrocephalus) (HCC) [G91.2]    Reason for Admission: NPH     Procedures: RIGHT VENTRICULOPERITONEAL SHUNT INSERTION (JOANNE) [facility]     Hospital Course: Mr. Olsen is a 74M with PMH HLD and SCC and NPH with significant improvement in gait and balance after high volume LP. He was admitted to [facility] on 24 and underwent insertion of R VPS without complication. He received one dose of Ancef pre-op and was admitted to NCCU post-op. Post-operative head CT and shunt series revealed good placement of shunt catheter. Pain was well controlled with Norco as needed. He worked with PT/OT/ST who recommended no further needs. He was deemed stable for discharge home on 24.     Complications: None    Discharge Condition: Good    Disposition: Home or Self Care    Discharge Medications:   Current Discharge Medication List        START taking these medications    Details   HYDROcodone-acetaminophen 5-325 MG Oral Tab Take 1-2 tablets by mouth every 4 (four) hours as needed for Pain.  Qty: 20 tablet, Refills: 0    Associated Diagnoses: Presence of ventriculopleural shunt           CONTINUE these medications which have NOT CHANGED    Details   atorvastatin 10 MG Oral Tab Take 1 tablet (10 mg total) by mouth nightly.  Qty: 90  tablet, Refills: 1      latanoprost 0.005 % Ophthalmic Solution Place 1 drop into both eyes nightly.  Refills: 1      clobetasol 0.05 % External Ointment Use bid to psoriasis  Qty: 60 g, Refills: 5             Follow up Visits:   Future Appointments   Date Time Provider Department Center   6/17/2024 10:15 AM Davida Rothman APRN ENINAPER2 [facility]             Patient Instructions:  Activity: activity as tolerated and no driving while on analgesics  Wound Care: Keep wound clean and dry  Ok to shower and wash scalp on POD #4 (6/9/24). Should wash head with baby shampoo and water. No scrubbing incision site or submersion of head under water. No ointments or creams to incision site. Staples will be removed at post-op appt on 6/17/24    Discussed with Dr. Mliler who agrees.    Nora Rider PA-C  [facility]  6/5/2024 8:58 AM

## 2024-06-05 NOTE — PHYSICAL THERAPY NOTE
PHYSICAL THERAPY EVALUATION - INPATIENT     Room Number: 6615/6615-A  Evaluation Date: 2024  Type of Evaluation: Initial  Physician Order: PT Eval and Treat    Presenting Problem: NPH s/p  shunt   Co-Morbidities : HLD, SCC, NPH, gait instability, psoriasis  Reason for Therapy: Mobility Dysfunction and Discharge Planning    PHYSICAL THERAPY ASSESSMENT   Patient is a 74 year old male admitted 2024 for scheduled BP shunt  for NPH.   Patient is currently functioning at baseline with bed mobility, transfers, gait, and stair negotiation. Prior to admission, patient's baseline is independent.     Given the patient is functioning near baseline level do not anticipate skilled therapy needs at discharge .    PLAN  Patient has been evaluated and presents with no skilled Physical Therapy needs at this time.  Patient discharged from Physical Therapy services.  Please re-order if a new functional limitation presents during this admission.    GOALS  Patient was able to achieve the following goals ...    Patient was able to transfer Safely and independently   Patient able to ambulate on level surfaces Safely and independently         HOME SITUATION  Type of Home: House   Home Layout: Multi-level        Stairs to Bedroom: 10  Railing: Yes    Lives With: Spouse  Drives: Yes  Patient Owned Equipment: Cane  Patient Regularly Uses: Glasses    Prior Level of Comins: Pt typically indep with ADLs and mobility. Enjoys woodworking.     SUBJECTIVE  \"I used to be wobbly and now I'm doing better\"       OBJECTIVE  Precautions:  (scalp incision, SBP <150)  Fall Risk: Standard fall risk    WEIGHT BEARING RESTRICTION  Weight Bearing Restriction: None                PAIN ASSESSMENT  Ratin          COGNITION  Overall Cognitive Status:  WFL - within functional limits    RANGE OF MOTION AND STRENGTH ASSESSMENT  See OT note for UE assessment    Lower extremity ROM is within functional limits     Lower extremity strength  is within functional limits; grossly 5/5 throughout       BALANCE  Static Sitting: Good  Dynamic Sitting: Good  Static Standing: Fair +  Dynamic Standing: Fair +    ADDITIONAL TESTS                                    ACTIVITY TOLERANCE                         O2 WALK       NEUROLOGICAL FINDINGS  Neurological Findings: Coordination - Heel to Shin;Coordination - Rapid Alternating Movement     Coordination - Heel to Shin: Symmetrical  Coordination - Rapid Alternating Movement: Symmetrical            AM-PAC '6-Clicks' INPATIENT SHORT FORM - BASIC MOBILITY  How much difficulty does the patient currently have...  Patient Difficulty: Turning over in bed (including adjusting bedclothes, sheets and blankets)?: None   Patient Difficulty: Sitting down on and standing up from a chair with arms (e.g., wheelchair, bedside commode, etc.): None   Patient Difficulty: Moving from lying on back to sitting on the side of the bed?: None   How much help from another person does the patient currently need...   Help from Another: Moving to and from a bed to a chair (including a wheelchair)?: None   Help from Another: Need to walk in hospital room?: None   Help from Another: Climbing 3-5 steps with a railing?: None       AM-PAC Score:  Raw Score: 24   Approx Degree of Impairment: 0%   Standardized Score (AM-PAC Scale): 61.14   CMS Modifier (G-Code): CH    FUNCTIONAL ABILITY STATUS  Gait Assessment   Functional Mobility/Gait Assessment  Gait Assistance: Independent  Distance (ft): 300  Assistive Device: None  Pattern: Within Functional Limits  Stairs: Stairs  How Many Stairs: 11  Device: 1 Rail  Assist: Modified independent  Pattern: Ascend and Descend  Ascend and Descend : Reciprocal    Skilled Therapy Provided     Bed Mobility:  Rolling: NT  Supine to sit: NT   Sit to supine: NT     Transfer Mobility:  Sit to stand: ind   Stand to sit: ind  Gait = ind    Therapist's comments:RN cleared for session. Pt agreeable for therapy, received up in  chair. Pt overall indep throughout session. Encouraged usage of rail for improved balance during stair navigation. Pt reports no concerns regarding functional mobility at this time. Instructed to call for nursing staff for any needs and OOB mobility.     Exercise/Education Provided:  Body mechanics  Energy conservation  Functional activity tolerated  Gait training  Posture  Strengthening  Transfer training    Patient End of Session: Up in chair;Needs met;Call light within reach;RN aware of session/findings;All patient questions and concerns addressed;Alarm set;Discussed recommendations with /    Patient Evaluation Complexity Level:  History High - 3 or more personal factors and/or co-morbidities   Examination of body systems Low - addressing 1-2 elements   Clinical Presentation Low - Stable   Clinical Decision Making Low Complexity       PT Session Time: 20 minutes  Gait Trainin minutes

## 2024-06-05 NOTE — PROGRESS NOTES
[facility]   part of [facility]     Hospitalist Progress Note     Edis Olsen Patient Status:  Inpatient    3/14/1950 MRN [id number]   Location [facility] 6NE-A Attending Gerardo Miller MD   Hosp Day # 1 PCP Patric Hernandez MD       Subjective:     Patient is feeling well today.  Wants to go home.  Denies any pain.  No n/v, no constipation or diarrhea.  No other acute complaints at this time.      Objective:    Review of Systems:   A comprehensive review of systems was completed; pertinent positive and negatives stated in subjective.    Vital signs:  Temp:  [96.7 °F (35.9 °C)-98 °F (36.7 °C)] 97.7 °F (36.5 °C)  Pulse:  [71-95] 94  Resp:  [10-21] 16  BP: (126-168)/() 160/90  SpO2:  [96 %-100 %] 99 %    Physical Exam:    General: No acute distress, pleasant   Respiratory: No wheezes, no rhonchi  Cardiovascular: S1, S2, regular rate and rhythm  Abdomen: Soft, Non-tender, non-distended, positive bowel sounds  Neuro: No new focal deficits.   Extremities: No edema    Diagnostic Data:    Labs:  Recent Labs   Lab 24  0443   WBC 7.1   HGB 13.1   MCV 93.5   .0       Recent Labs   Lab 24  0443   *   BUN 17   CREATSERUM 1.18   CA 8.5      K 4.4      CO2 22.0       Estimated Creatinine Clearance: 55.8 mL/min (based on SCr of 1.18 mg/dL).    No results for input(s): \"TROP\", \"TROPHS\", \"CK\" in the last 168 hours.    No results for input(s): \"PTP\", \"INR\" in the last 168 hours.               Microbiology    No results found for this visit on 24.      Imaging: Reviewed in Epic.    Medications:    latanoprost  1 drop Both Eyes Nightly       Assessment & Plan:      #NPH  -s/p  shunt   -per NS and Gen Sx     #Hyperlipidemia  -Statin    Labs reviewed, Hg 13.1, vitals stable.  Medically cleared for discharge once okay per Neuro and NeuroSx         Abhay Fishman MD    Supplementary Documentation:     Quality:  DVT Mechanical Prophylaxis: SUYAPA hose, Zoila,    DVT  Pharmacologic Prophylaxis   Medication   None                Code Status: Not on file  Cee: No urinary catheter in place  Cee Duration (in days):   Central line:    MORRIS:     Discharge is dependent on: cleared for hoome  At this point Mr. Olsen is expected to be discharge to: Home    The 21st Century Cures Act makes medical notes like these available to patients in the interest of transparency. Please be advised this is a medical document. Medical documents are intended to carry relevant information, facts as evident, and the clinical opinion of the practitioner. The medical note is intended as peer to peer communication and may appear blunt or direct. It is written in medical language and may contain abbreviations or verbiage that are unfamiliar.

## 2024-06-05 NOTE — PLAN OF CARE
Problem: NEUROLOGICAL - ADULT  Goal: Achieves stable or improved neurological status  Description: INTERVENTIONS  - Assess for and report changes in neurological status  - Initiate measures to prevent increased intracranial pressure  - Maintain blood pressure and fluid volume within ordered parameters to optimize cerebral perfusion and minimize risk of hemorrhage  - Monitor temperature, glucose, and sodium. Initiate appropriate interventions as ordered  Outcome: Progressing  Goal: Achieves maximal functionality and self care  Description: INTERVENTIONS  - Monitor swallowing and airway patency with patient fatigue and changes in neurological status  - Encourage and assist patient to increase activity and self care with guidance from PT/OT  - Encourage visually impaired, hearing impaired and aphasic patients to use assistive/communication devices  Outcome: Progressing    Patient VSS.  No major issues with pain, headache, or dizziness/lightheadedness.  Patient surgical site intact with no new drainage or issues.  Patient Cee removed with good output.  Up to chair with minimal complications.  Plan of care updated with patient and will continue to monitor closely.

## 2024-06-05 NOTE — PROGRESS NOTES
[facility]   NEUROCRITICAL CARE   PROGRESS NOTE    Admission date: 6/4/2024  Reason for Consult: s/p VPS  Chief Complaint: Gait instability  ________________________________________________________________    SUBJECTIVE     24 Hour Significant Events: Neurologically stable overnight.  CT head and shunt series completed. No new complaints this AM.    OBJECTIVE   VITAL SIGNS:   Temp:  [96.7 °F (35.9 °C)-98 °F (36.7 °C)] 97.5 °F (36.4 °C)  Pulse:  [71-95] 74  Resp:  [10-21] 21  BP: (126-168)/() 150/84  SpO2:  [96 %-100 %] 100 %    INTAKE/OUTPUT:  Intake/Output Summary (Last 24 hours) at 6/5/2024 0826  Last data filed at 6/5/2024 0800  Gross per 24 hour   Intake 3118 ml   Output 1670 ml   Net 1448 ml     PHYSICAL EXAM:  GENERAL APPEARANCE: Patient resting comfortably in chair, NAD  HEENT: Normocephalic, atraumatic.   LUNGS: Normal respiratory rate and effort   HEART: Regular rate and rhythm   ABDOMEN: Soft. No tenderness, guarding, or rebound.   EXTREMITIES: No cyanosis, clubbing, or edema.    NEUROLOGIC:    Mental Status:  A&O x 3, Follows simple commands, following more complex commands.  No obvious aphasia or dysarthria  Cranial nerves: PERRL.  Visual fields full.  EOMI.  Face symmetric with normal movement bilaterally.  Hearing grossly intact. Tongue midline with normal movements.   Motor: Drift:  Absent; Motor exam is 5 out of 5 in all extremities bilaterally  Sensation: Intact to light touch bilaterally   Cerebellar: Normal Finger-To-Nose test, no obvious dysmetria    LABORATORY DATA:  Last 24 hour labs were reviewed in detail.  Recent Labs   Lab 06/05/24  0443      K 4.4      CO2 22.0   *   BUN 17   CREATSERUM 1.18     Recent Labs   Lab 06/05/24  0443   WBC 7.1   HGB 13.1   .0     No results for input(s): \"TOTALPROTEIN\", \"ALBUMIN\", \"ALT\", \"AST\" in the last 168 hours.    Invalid input(s): \"TOTALBILIRUBIN\", \"ALKPHOSPHATE\"  No results for input(s): \"MG\", \"PHOS\" in  the last 168 hours.    Scheduled Meds:   latanoprost  1 drop Both Eyes Nightly     Continuous Infusions:  PRN Meds:  meperidine    ondansetron    metoclopramide    HYDROcodone-acetaminophen    HYDROcodone-acetaminophen    morphINE    Radiology:    XR SHUNT POSITION SERIES  (CPT=70250/87738/44501/86804)    Result Date: 6/4/2024  CONCLUSION:   Right posterior parietal approach  shunt catheter identified, the catheter coursing along the right neck and anterior chest/abdominal wall, terminating within the central lower abdomen.  No evidence of shunt kinking or discontinuity.  The visualized lungs and pleural spaces are clear.  Mild gaseous distention of the stomach.  No radiographic evidence of ileus or bowel obstruction.  Osteoarthritis of the hips and spine.   LOCATION:  [facility]    Dictated by (CST): Marc Wallace MD on 6/04/2024 at 8:56 PM     Finalized by (CST): Marc Wallace MD on 6/04/2024 at 8:58 PM       CT BRAIN OR HEAD (50670)    Result Date: 6/4/2024  CONCLUSION:   Interval placement of right parietal approach  shunt catheter terminating within the frontal horn of the left lateral ventricle near the septum pellucidum.  Improving ventriculomegaly.  Trace pneumocephalus, concordant with recent intervention.    LOCATION:  [facility]   Dictated by (CST): Marc Wallace MD on 6/04/2024 at 6:47 PM     Finalized by (CST): Marc Wallace MD on 6/04/2024 at 6:56 PM      ASSESSMENT/PLAN   74 year old male with PMH of HLD and SCC who presents status post  shunt placement.       Neurological:  NPH         - MRI showing ventriculomegaly, hx of gait instability, urinary urgency but no incontinence          - Improvement noted after high-volume lumbar puncture.          - Now s/p VPS placement         - Shunt series and CT head done with expected postoperative changes         - Pain controled         - PT/OT evals done    Cardiovascular:         - SBP goal < 150         - IV labetalol/hydralazine pushes prn            HLD - Continue  home med     Pulmonary: - Satting well on RA, encourage IS      Renal:  - Monitor I&Os       Gastrointestinal:  Nutrition:        - ADAT        - Bowel regimen: ordered prn     Infectious Disease: - Afebrile, continue to monitor      Heme/Onc - No signs of active bleeding, continue to monitor daily      Endocrine: - Accuchecks q6 with SSI prn while NPO     Checklist         - Lines: PIVs         - DVT Prophylaxis: SCDs          - Diet: ADAT         - IVF: Dc          - Electrolytes: Replete per protocol         - Code Status: FULL     Dispo: CNICU pending neurosurgery clearance     Greater than 35 minutes of critical care time (exclusive of billable procedures) was administered to manage and/or prevent neurologic instability. This involved direct patient intervention, complex decision making, and/or extensive discussions with the patient, family, and clinical staff.     Laura Arnold MD  Neurocritical Care  [facility]     Disclaimer: This record was dictated using Dragon software. There may be errors due to voice recognition problems that were not realized and corrected during the completion of the note.

## 2024-06-05 NOTE — PROGRESS NOTES
VSS, neuro checks intact. Ambulating in hallway with standby assist. Discharge orders received and discharge education done with pt and family. PIV discontinued. Questions answered. Meds to beds with delivery of prescription. No other needs at this time. Discharging home with wife in private vehicle

## 2024-06-05 NOTE — DISCHARGE INSTRUCTIONS
Patient Instructions:  Activity: activity as tolerated and no driving while on [place]  Wound Care: Keep wound clean and dry  Ok to shower and wash scalp on POD #4 (6/9/24). Should wash head with baby shampoo and water. Pat dry with clean towel. No scrubbing incision site or submersion of head under water. No ointments or creams to incision site. Staples will be removed at post-op appt on 6/17/24.

## 2024-06-05 NOTE — SLP NOTE
Order received post  shunt placement. Patient underwent HVLP 5/14/24. MoCA administered prior to draining CSF resulted in score of 24/30. Post drain, patient scored 29/30. He notes no concern with his cognition though does report some baseline memory deficits. Discussed anticipated improvement in cognition with  shunt placement though educated on following up with OP SLP services should he feel his cognition not improve after allowing time for shunt drain adequately. He verbalized understanding and agreement.     Larry Euceda MS CCC-SLP  Pager [phone]

## 2024-06-06 ENCOUNTER — PATIENT OUTREACH (OUTPATIENT)
Dept: CASE MANAGEMENT | Age: 74
End: 2024-06-06

## 2024-06-07 ENCOUNTER — TELEPHONE (OUTPATIENT)
Dept: SURGERY | Facility: CLINIC | Age: 74
End: 2024-06-07

## 2024-06-07 NOTE — TELEPHONE ENCOUNTER
Received pt reminder notice       Per pt reminder note:    \"Pt scheduled for surgery on 6.4.24 with Dr. Miller     RIGHT VENTRICULOPERITONEAL SHUNT INSERTION (JOANNE) DIAGNOSTIC LAPAROSCOPY (JOSEPHINE) Right General   NAVIGATION SYSTEM NEURO Right        Routed to RN pool

## 2024-06-17 ENCOUNTER — OFFICE VISIT (OUTPATIENT)
Dept: SURGERY | Facility: CLINIC | Age: 74
End: 2024-06-17
Payer: MEDICARE

## 2024-06-17 VITALS
SYSTOLIC BLOOD PRESSURE: 130 MMHG | HEIGHT: 70 IN | DIASTOLIC BLOOD PRESSURE: 70 MMHG | WEIGHT: 180 LBS | BODY MASS INDEX: 25.77 KG/M2

## 2024-06-17 DIAGNOSIS — Z98.2 PRESENCE OF VENTRICULOPLEURAL SHUNT: ICD-10-CM

## 2024-06-17 RX ORDER — HYDROCODONE BITARTRATE AND ACETAMINOPHEN 5; 325 MG/1; MG/1
1-2 TABLET ORAL EVERY 4 HOURS PRN
Qty: 20 TABLET | Refills: 0 | Status: SHIPPED | OUTPATIENT
Start: 2024-06-17

## 2024-06-17 NOTE — PATIENT INSTRUCTIONS
See Dr Abebe on Wednesday 6/19 at 3:45PM  [address] [place] [address]    See Dr Miller in 1 month    Take pain meds as needed    Monitor for signs of surgical site infection        Refill policies:    Allow 2-3 business days for refills; controlled substances may take longer.  Contact your pharmacy at least 5 days prior to running out of medication and have them send an electronic request or submit request through the “request refill” option in your [employer] account.  Refills are not addressed on weekends; covering physicians do not authorize routine medications on weekends.  No narcotics or controlled substances are refilled after noon on Fridays or by on call physicians.  By law, narcotics must be electronically prescribed.  A 30 day supply with no refills is the maximum allowed.  If your prescription is due for a refill, you may be due for a follow up appointment.  To best provide you care, patients receiving routine medications need to be seen at least once a year.  Patients receiving narcotic/controlled substance medications need to be seen at least once every 3 months.  In the event that your preferred pharmacy does not have the requested medication in stock (e.g. Backordered), it is your responsibility to find another pharmacy that has the requested medication available.  We will gladly send a new prescription to that pharmacy at your request.    Scheduling Tests:    If your physician has ordered radiology tests such as MRI or CT scans, please contact Central Scheduling at [phone] right away to schedule the test.  Once scheduled, the [facility] Centralized Referral Team will work with your insurance carrier to obtain pre-certification or prior authorization.  Depending on your insurance carrier, approval may take 3-10 days.  It is highly recommended patients assure they have received an authorization before having a test performed.  If test is done without insurance authorization, patient  may be responsible for the entire amount billed.      Precertification and Prior Authorizations:  If your physician has recommended that you have a procedure or additional testing performed the [facility] Centralized Referral Team will contact your insurance carrier to obtain pre-certification or prior authorization.    You are strongly encouraged to contact your insurance carrier to verify that your procedure/test has been approved and is a COVERED benefit.  Although the [facility] Centralized Referral Team does its due diligence, the insurance carrier gives the disclaimer that \"Although the procedure is authorized, this does not guarantee payment.\"    Ultimately the patient is responsible for payment.   Thank you for your understanding in this matter.  Paperwork Completion:  If you require FMLA or disability paperwork for your recovery, please make sure to either drop it off or have it faxed to our office at [phone]. Be sure the form has your name and date of birth on it.  The form will be faxed to our Forms Department and they will complete it for you.  There is a 25$ fee for all forms that need to be filled out.  Please be aware there is a 10-14 day turnaround time.  You will need to sign a release of information (SHARON) form if your paperwork does not come with one.  You may call the Forms Department with any questions at [phone].  Their fax number is [phone].

## 2024-06-17 NOTE — PROGRESS NOTES
Established patient:  Reason for follow up: 2 week post op   RIGHT VENTRICULOPERITONEAL SHUNT INSERTION (JOANNE) DIAGNOSTIC LAPAROSCOPY (JOSEPHINE) Right General   NAVIGATION SYSTEM NEURO Right      Pt stated he is doing well       Distribution of Pain:    right

## 2024-06-17 NOTE — PROGRESS NOTES
Count includes the [facility]  Neurological Surgery Clinic Note    Edis Olsen  3/14/1950  [id number]  PCP: Patric Hernandez MD    REASON FOR VISIT:  Post op    HISTORY OF PRESENT ILLNESS:  Edis Olsen is a 74 year old male PMH HLD and SCC and NPH with significant improvement in gait and balance after high volume LP. He was admitted to [facility] on 6/4/24 and underwent insertion of R VPS without complication. He received one dose of Ancef pre-op and was admitted to [facility] post-op. Post-operative head CT and shunt series revealed good placement of shunt catheter. Pain was well controlled with Norco as needed. He worked with PT/OT/ST who recommended no further needs. He was deemed stable for discharge home on 6/5/24.     Today he presents for a postop wound check.  The patient states that his walking has been improving.  He does not complain about any incisional sites on his head or any signs or symptoms of infection.  He reports having right and left lower quadrants sharp stabbing pain with forward flexion.  His pain is relieved with his pain pills.  He denies any excessive activity.  He denies any signs or symptoms of infection or abdominal rigidness.    PAST MEDICAL HISTORY:  Past Medical History:    High cholesterol    Hyperlipidemia    life style changes    Osteoarthritis    Plantar fasciitis    steroid injection x2    Psoriasis    SCC (squamous cell carcinoma)    Sleep apnea    MILD, NO MACHINE/TREATMENT    Visual impairment    GLASSES       PAST SURGICAL HISTORY:  Past Surgical History:   Procedure Laterality Date    Colonoscopy  1999    Colonoscopy N/A 01/30/2023    Procedure: COLONOSCOPY;  Surgeon: Franki Lucero MD;  Location: [facility]    Other surgical history  2013    Lt thumb 4 sutures    Other surgical history Right 06/04/2024    RIGHT VENTRICULOPERITONEAL SHUNT INSERTION       FAMILY HISTORY:  family history includes Kidney Disease in his father; Other in his mother.    SOCIAL  HISTORY:   reports that he has quit smoking. He has never used smokeless tobacco. He reports current alcohol use. He reports that he does not use drugs.    ALLERGIES:  No Known Allergies    MEDICATIONS:  Current Outpatient Medications on File Prior to Visit   Medication Sig Dispense Refill    HYDROcodone-acetaminophen 5-325 MG Oral Tab Take 1-2 tablets by mouth every 4 (four) hours as needed for Pain. 20 tablet 0    clobetasol 0.05 % External Ointment Use bid to psoriasis 60 g 5    atorvastatin 10 MG Oral Tab Take 1 tablet (10 mg total) by mouth nightly. 90 tablet 1    latanoprost 0.005 % Ophthalmic Solution Place 1 drop into both eyes nightly.  1     No current facility-administered medications on file prior to visit.       REVIEW OF SYSTEMS:  A 10-point system was reviewed.  Pertinent positives and negatives are noted in HPI.      PHYSICAL EXAMINATION:  VITAL SIGNS: /70 (BP Location: Right arm, Patient Position: Sitting, Cuff Size: large)   Ht 70\"   Wt 180 lb (81.6 kg)   BMI 25.83 kg/m²     A&Ox3, no acute distress  PERRL, EOMi, FS, TM  Full strength x 4, no drift  Sensation intact   Staples removed, surgical site is healing well  Abdominal laparoscopic sites healing well no signs of infection  The abdomen is soft and nondistended            ASSESSMENT:  74-year-old male status post [employer]tr[employer] strata II  shunt placement on 6/4/2024  Initial setting is 1.5    Plan:  Refilled pain meds  Recommended a follow-up with Dr. Abebe on Wednesday to evaluate abdominal pain  Follow-up with Dr. Miller in 1 month    We discussed ongoing surgical site care.  The patient was educated to call or message with any concerns of wound infection or any severe pain.    We reviewed stroke symptoms and when to go to the ED. The patient was instructed to continue healthy lifestyle habits to include not smoking, managing blood pressure, healthy eating and regular exercise.    All questions were answered and the patient was  instructed to call or message with any additional questions or concerns.       JANEY Haji, CNP  Neurological Surgery  [facility]    Care Time: 30 min including face to face time, chart review, imaging interpretation, and coordination of care

## 2024-06-19 ENCOUNTER — OFFICE VISIT (OUTPATIENT)
Facility: LOCATION | Age: 74
End: 2024-06-19

## 2024-06-19 VITALS — OXYGEN SATURATION: 97 % | TEMPERATURE: 98 F | HEART RATE: 108 BPM

## 2024-06-19 DIAGNOSIS — Z98.890 POST-OPERATIVE STATE: Primary | ICD-10-CM

## 2024-06-19 PROCEDURE — 99024 POSTOP FOLLOW-UP VISIT: CPT | Performed by: STUDENT IN AN ORGANIZED HEALTH CARE EDUCATION/TRAINING PROGRAM

## 2024-06-25 NOTE — PROGRESS NOTES
Post Operative Visit Note       Active Problems  1. Post-operative state         Chief Complaint   Chief Complaint   Patient presents with    Post-Op     PO - RIGHT VENTRICULOPERITONEAL SHUNT INSERTION ([facility]) [facility] NAVIGATION SYSTEM NEURO 6/4/24, soreness, no other symptoms.                History of Present Illness   74 year old male who is status post laparoscopic  shunt placement on 6/4 presents for follow up.   Patient reports intermittent pain in the right hemipelvis. These are short in duration and resolve spontaneously. He denies fevers, chills, nausea or vomiting or constipation or diarrhea.       Allergies  Edis has No Known Allergies.    Past Medical / Surgical / Social / Family History    The past medical and past surgical history have been reviewed by me today.     Past Medical History:    High cholesterol    Hyperlipidemia    life style changes    Osteoarthritis    Plantar fasciitis    steroid injection x2    Psoriasis    SCC (squamous cell carcinoma)    Sleep apnea    MILD, NO MACHINE/TREATMENT    Visual impairment    GLASSES     Past Surgical History:   Procedure Laterality Date    Colonoscopy  1999    Colonoscopy N/A 01/30/2023    Procedure: COLONOSCOPY;  Surgeon: Franki Lucero MD;  [facility]: [facility]    Other surgical history  2013    Lt thumb 4 sutures    Other surgical history Right 06/04/2024    RIGHT VENTRICULOPERITONEAL SHUNT INSERTION       The family history and social history have been reviewed by me today.    Family History   Problem Relation Age of Onset    Kidney Disease Father         kidney failure (cause of death)    Other (Other) Mother         natural causes (cause of death)     Social History     Socioeconomic History    Marital status:    Tobacco Use    Smoking status: Former    Smokeless tobacco: Never   Vaping Use    Vaping status: Never Used   Substance and Sexual Activity    Alcohol use: Yes     Comment: occ, wine    Drug  use: No    Sexual activity: Not Currently     Partners: Female   Other Topics Concern    Caffeine Concern Yes     Comment: soda, occ    Stress Concern No    Weight Concern No    Exercise No    Seat Belt Yes    Pt has a pacemaker No    Pt has a defibrillator No    Reaction to local anesthetic No        Current Outpatient Medications:     HYDROcodone-acetaminophen 5-325 MG Oral Tab, Take 1-2 tablets by mouth every 4 (four) hours as needed for Pain., Disp: 20 tablet, Rfl: 0    clobetasol 0.05 % External Ointment, Use bid to psoriasis, Disp: 60 g, Rfl: 5    atorvastatin 10 MG Oral Tab, Take 1 tablet (10 mg total) by mouth nightly., Disp: 90 tablet, Rfl: 1    latanoprost 0.005 % Ophthalmic Solution, Place 1 drop into both eyes nightly., Disp: , Rfl: 1      Review of Systems  A 10 point Review of Systems has been completed by me today and is negative except as above in the HPI.    Physical Findings   Pulse 108   Temp 98 °F (36.7 °C) (Temporal)   SpO2 97%   Gen/psych: alert and oriented, cooperative, no apparent distress  Cardiovascular: regular rate  Respiratory: respirations unlabored, no wheeze  Abdominal: soft, non-tender, non-distended, no guarding/rebound,  incisions are clean dry and intact, no erythema         Assessment/Plan  1. Post-operative state        74 year old male who is status post  shunt placement on 6/4 presents for follow up.  Infrequent twinges of pain. No signs of infection or post operative issues.   I have no further follow-up scheduled with this patient at this time.  This patient can see me on an as-needed basis.  This patient should return urgently for any problems or complications related to my surgical intervention.       Follow Up  Return if symptoms worsen or fail to improve.    Stephanie Abebe MD  [facility]

## 2024-07-05 ENCOUNTER — OFFICE VISIT (OUTPATIENT)
Dept: SURGERY | Facility: CLINIC | Age: 74
End: 2024-07-05
Payer: MEDICARE

## 2024-07-05 VITALS
WEIGHT: 180 LBS | DIASTOLIC BLOOD PRESSURE: 82 MMHG | HEIGHT: 70 IN | HEART RATE: 77 BPM | SYSTOLIC BLOOD PRESSURE: 138 MMHG | BODY MASS INDEX: 25.77 KG/M2

## 2024-07-05 DIAGNOSIS — Z98.2 S/P VENTRICULOPERITONEAL SHUNT: ICD-10-CM

## 2024-07-05 DIAGNOSIS — G91.2 NPH (NORMAL PRESSURE HYDROCEPHALUS) (HCC): Primary | ICD-10-CM

## 2024-07-05 PROCEDURE — 99024 POSTOP FOLLOW-UP VISIT: CPT | Performed by: NEUROLOGICAL SURGERY

## 2024-07-05 NOTE — PROGRESS NOTES
[facility]  Neurological Surgery Clinic Note    Edis Olsen  3/14/1950  [id number]  PCP: Patric Hernandez MD    REASON FOR VISIT:  NPH s/p VPS    HISTORY OF PRESENT ILLNESS:  Edis Olsen is a 74 year old male who presents to clinic for routine follow up after VPS on 6/4/2024 for NPH.  He reports significant improvement in his gait and balance since the procedure.  He is now able to do what he would like to do including mowing the lawn independently.  He reports continued stabbing abdominal pains that happen on both sides of the abdomen, most often exacerbated with bending or squatting.  This pain can last throughout the night causing difficulty sleeping.  He has not taken any medications for this pain.  He saw Dr. Abebe a few weeks ago for this issue.    PAST MEDICAL HISTORY:  Past Medical History:    High cholesterol    Hyperlipidemia    life style changes    Osteoarthritis    Plantar fasciitis    steroid injection x2    Psoriasis    SCC (squamous cell carcinoma)    Sleep apnea    MILD, NO MACHINE/TREATMENT    Visual impairment    GLASSES       PAST SURGICAL HISTORY:  Past Surgical History:   Procedure Laterality Date    Colonoscopy  1999    Colonoscopy N/A 01/30/2023    Procedure: COLONOSCOPY;  Surgeon: Franki Lucero MD;  Location: [facility]    Other surgical history  2013    Lt thumb 4 sutures    Other surgical history Right 06/04/2024    RIGHT VENTRICULOPERITONEAL SHUNT INSERTION       FAMILY HISTORY:  family history includes Kidney Disease in his father; Other in his mother.    SOCIAL HISTORY:   reports that he has quit smoking. He has never used smokeless tobacco. He reports current alcohol use. He reports that he does not use drugs.    ALLERGIES:  No Known Allergies    MEDICATIONS:  Current Outpatient Medications on File Prior to Visit   Medication Sig Dispense Refill    clobetasol 0.05 % External Ointment Use bid to psoriasis 60 g 5     atorvastatin 10 MG Oral Tab Take 1 tablet (10 mg total) by mouth nightly. 90 tablet 1    latanoprost 0.005 % Ophthalmic Solution Place 1 drop into both eyes nightly.  1    HYDROcodone-acetaminophen 5-325 MG Oral Tab Take 1-2 tablets by mouth every 4 (four) hours as needed for Pain. (Patient not taking: Reported on 7/5/2024) 20 tablet 0     No current facility-administered medications on file prior to visit.       REVIEW OF SYSTEMS:  A 10-point system was reviewed.  Pertinent positives and negatives are noted in HPI.      PHYSICAL EXAMINATION:  VITAL SIGNS: /82   Pulse 77   Ht 70\"   Wt 180 lb (81.6 kg)   BMI 25.83 kg/m²     A&Ox3, no acute distress  PERRL, EOMi, FS, TM  Full strength x 4, no drift  Sensation intact   Incision healing well    ASSESSMENT:  74-year-old male status post  shunt for NPH    Plan:  - Follow-up with me in 6 months  - Follow-up with Dr. Abebe if abdominal pain has not improved in a month    Gerardo Miller MD  Neurological Surgery  [facility] Time: 15 min including face to face time, chart review, imaging interpretation, and coordination of care

## 2024-07-05 NOTE — PATIENT INSTRUCTIONS
Refill policies:    Allow 2-3 business days for refills; controlled substances may take longer.  Contact your pharmacy at least 5 days prior to running out of medication and have them send an electronic request or submit request through the “request refill” option in your [employer] account.  Refills are not addressed on weekends; covering physicians do not authorize routine medications on weekends.  No narcotics or controlled substances are refilled after noon on Fridays or by on call physicians.  By law, narcotics must be electronically prescribed.  A 30 day supply with no refills is the maximum allowed.  If your prescription is due for a refill, you may be due for a follow up appointment.  To best provide you care, patients receiving routine medications need to be seen at least once a year.  Patients receiving narcotic/controlled substance medications need to be seen at least once every 3 months.  In the event that your preferred pharmacy does not have the requested medication in stock (e.g. Backordered), it is your responsibility to find another pharmacy that has the requested medication available.  We will gladly send a new prescription to that pharmacy at your request.    Scheduling Tests:    If your physician has ordered radiology tests such as MRI or CT scans, please contact Central Scheduling at [phone] right away to schedule the test.  Once scheduled, the [facility] Centralized Referral Team will work with your insurance carrier to obtain pre-certification or prior authorization.  Depending on your insurance carrier, approval may take 3-10 days.  It is highly recommended patients assure they have received an authorization before having a test performed.  If test is done without insurance authorization, patient may be responsible for the entire amount billed.      Precertification and Prior Authorizations:  If your physician has recommended that you have a procedure or additional testing performed the [facility]  Centralized Referral Team will contact your insurance carrier to obtain pre-certification or prior authorization.    You are strongly encouraged to contact your insurance carrier to verify that your procedure/test has been approved and is a COVERED benefit.  Although the [facility] Centralized Referral Team does its due diligence, the insurance carrier gives the disclaimer that \"Although the procedure is authorized, this does not guarantee payment.\"    Ultimately the patient is responsible for payment.   Thank you for your understanding in this matter.  Paperwork Completion:  If you require FMLA or disability paperwork for your recovery, please make sure to either drop it off or have it faxed to our office at [phone]. Be sure the form has your name and date of birth on it.  The form will be faxed to our Forms Department and they will complete it for you.  There is a 25$ fee for all forms that need to be filled out.  Please be aware there is a 10-14 day turnaround time.  You will need to sign a release of information (SHARON) form if your paperwork does not come with one.  You may call the Forms Department with any questions at [phone].  Their fax number is [phone].

## 2024-09-12 ENCOUNTER — OFFICE VISIT (OUTPATIENT)
Dept: DERMATOLOGY CLINIC | Facility: CLINIC | Age: 74
End: 2024-09-12
Payer: MEDICARE

## 2024-09-12 DIAGNOSIS — L81.4 LENTIGO: ICD-10-CM

## 2024-09-12 DIAGNOSIS — L57.0 AK (ACTINIC KERATOSIS): Primary | ICD-10-CM

## 2024-09-12 DIAGNOSIS — Z85.828 ENCOUNTER FOR FOLLOW-UP SURVEILLANCE OF SKIN CANCER: ICD-10-CM

## 2024-09-12 DIAGNOSIS — Z08 ENCOUNTER FOR FOLLOW-UP SURVEILLANCE OF SKIN CANCER: ICD-10-CM

## 2024-09-12 DIAGNOSIS — D23.9 BENIGN NEOPLASM OF SKIN, UNSPECIFIED LOCATION: ICD-10-CM

## 2024-09-12 DIAGNOSIS — L40.0 PSORIASIS VULGARIS: ICD-10-CM

## 2024-09-12 DIAGNOSIS — D22.9 MULTIPLE NEVI: ICD-10-CM

## 2024-09-22 NOTE — PROGRESS NOTES
Edis Olsen is a 74 year old male.  HPI:     CC:    Chief Complaint   Patient presents with    Full Skin Exam     Hx of Aks and BCC.  LOV 9/2023.  Pt presents for FBSE.  Lesion of concern to the right temple, right neck, and right eyebrow.  Denies bleeding or pain.          Allergies:  Patient has no known allergies.    HISTORY:    Past Medical History:    High cholesterol    Hyperlipidemia    life style changes    Osteoarthritis    Plantar fasciitis    steroid injection x2    Psoriasis    SCC (squamous cell carcinoma)    Sleep apnea    MILD, NO MACHINE/TREATMENT    Visual impairment    GLASSES      Past Surgical History:   Procedure Laterality Date    Colonoscopy  1999    Colonoscopy N/A 01/30/2023    Procedure: COLONOSCOPY;  Surgeon: Franki Lucero MD;  Location: [facility]    Other surgical history  2013    Lt thumb 4 sutures    Other surgical history Right 06/04/2024    RIGHT VENTRICULOPERITONEAL SHUNT INSERTION      Family History   Problem Relation Age of Onset    Kidney Disease Father         kidney failure (cause of death)    Other (Other) Mother         natural causes (cause of death)      Social History     Socioeconomic History    Marital status:    Tobacco Use    Smoking status: Former    Smokeless tobacco: Never   Vaping Use    Vaping status: Never Used   Substance and Sexual Activity    Alcohol use: Yes     Comment: occ, wine    Drug use: No    Sexual activity: Not Currently     Partners: Female   Other Topics Concern    Caffeine Concern Yes     Comment: soda, occ    Stress Concern No    Weight Concern No    Exercise No    Seat Belt Yes    Grew up on a farm No    History of tanning Yes    Outdoor occupation No    Pt has a pacemaker No    Pt has a defibrillator No    Reaction to local anesthetic No     Social Determinants of Health     Food Insecurity: No Food Insecurity (6/5/2024)    Food Insecurity     Food Insecurity: Never true   Transportation Needs: No Transportation Needs  (6/5/2024)    Transportation Needs     Lack of Transportation: No   Housing Stability: Low Risk  (6/5/2024)    Housing Stability     Housing Instability: No        Current Outpatient Medications   Medication Sig Dispense Refill    clobetasol 0.05 % External Ointment Use bid to psoriasis 60 g 5    atorvastatin 10 MG Oral Tab Take 1 tablet (10 mg total) by mouth nightly. 90 tablet 1    latanoprost 0.005 % Ophthalmic Solution Place 1 drop into both eyes nightly.  1    HYDROcodone-acetaminophen 5-325 MG Oral Tab Take 1-2 tablets by mouth every 4 (four) hours as needed for Pain. (Patient not taking: Reported on 7/5/2024) 20 tablet 0     Allergies:   No Known Allergies    Past Medical History:    High cholesterol    Hyperlipidemia    life style changes    Osteoarthritis    Plantar fasciitis    steroid injection x2    Psoriasis    SCC (squamous cell carcinoma)    Sleep apnea    MILD, NO MACHINE/TREATMENT    Visual impairment    GLASSES     Past Surgical History:   Procedure Laterality Date    Colonoscopy  1999    Colonoscopy N/A 01/30/2023    Procedure: COLONOSCOPY;  Surgeon: Franki Lucero MD;  [facility]    Other surgical history  2013    Lt thumb 4 sutures    Other surgical history Right 06/04/2024    RIGHT VENTRICULOPERITONEAL SHUNT INSERTION     Social History     Socioeconomic History    Marital status:      Spouse name: Not on file    Number of children: Not on file    Years of education: Not on file    Highest education level: Not on file   Occupational History    Not on file   Tobacco Use    Smoking status: Former    Smokeless tobacco: Never   Vaping Use    Vaping status: Never Used   Substance and Sexual Activity    Alcohol use: Yes     Comment: occ, wine    Drug use: No    Sexual activity: Not Currently     Partners: Female   Other Topics Concern     Service Not Asked    Blood Transfusions Not Asked    Caffeine Concern Yes     Comment: soda, occ    Occupational Exposure Not Asked     Hobby Hazards Not Asked    Sleep Concern Not Asked    Stress Concern No    Weight Concern No    Special Diet Not Asked    Back Care Not Asked    Exercise No    Bike Helmet Not Asked    Seat Belt Yes    Self-Exams Not Asked    Grew up on a farm No    History of tanning Yes    Outdoor occupation No    Pt has a pacemaker No    Pt has a defibrillator No    Reaction to local anesthetic No   Social History Narrative    Not on file     Social Determinants of Health     Financial Resource Strain: Not on file   Food Insecurity: No Food Insecurity (6/5/2024)    Food Insecurity     Food Insecurity: Never true   Transportation Needs: No Transportation Needs (6/5/2024)    Transportation Needs     Lack of Transportation: No     Car Seat: Not on file   Physical Activity: Not on file   Stress: Not on file   Social Connections: Not on file   Housing Stability: Low Risk  (6/5/2024)    Housing Stability     Housing Instability: No     Housing Instability Emergency: Not on file     Crib or Bassinette: Not on file     Family History   Problem Relation Age of Onset    Kidney Disease Father         kidney failure (cause of death)    Other (Other) Mother         natural causes (cause of death)       There were no vitals filed for this visit.    HPI:    Chief Complaint   Patient presents with    Full Skin Exam     Hx of Aks and BCC.  LOV 9/2023.  Pt presents for FBSE.  Lesion of concern to the right temple, right neck, and right eyebrow.  Denies bleeding or pain.      History BCC AK's.  For follow-up  No particular concerns   uses sunscreen regularly over the summer   Follow-up SCC biopsy 11/22    psoriasis lesions longstanding history of psoriasis elbows knees longstanding.    Nothing else new or different.  No joint pain associated.  No other complaints.  Patient presents with concerns above.    Past notes/ records and appropriate/relevant lab results including pathology and past body maps reviewed. Updated and new information noted in  current visit.     Patient has been in their usual state of health.  History, medications, allergies reviewed as noted.      ROS:  Denies any other systemic complaints.  No new or changeing lesions other than noted above. No fevers, chills, night sweats, unusual sun sensitivity.  No other skin complaints.        History, medications, allergies reviewed as noted.       Physical Examination:     Well-developed well-nourished patient alert oriented in no acute distress.  Exam total-body performed, including scalp, head, neck, face,nails, hair, external eyes, including conjunctival mucosa, eyelids, lips external ears, back, chest,/ breasts, axillae,  abdomen, arms, legs, palms.     Multiple light to medium brown, well marginated, uniformly pigmented, macules and papules 6 mm and less scattered on exam. pigmented lesions examined with dermoscopy benign-appearing patterns.     Waxy tannish keratotic papules scattered, cherry-red vascular papules scattered.    See map today's date for lesions noted .      Otherwise remarkable for lesions as noted on map.  See details of examination  See Assessment /Plan for additional history and physical exam also:      Early AK's anterior scalp temples.  Left cheek    Psoriasiform patches right postauricular neck, knees    Assessment / plan:    No orders of the defined types were placed in this encounter.      Meds & Refills for this Visit:  Requested Prescriptions      No prescriptions requested or ordered in this encounter         Encounter Diagnoses   Name Primary?    AK (actinic keratosis) Yes    Lentigo     Benign neoplasm of skin, unspecified location     Multiple nevi     Psoriasis vulgaris     Encounter for follow-up surveillance of skin cancer        See details on map.      Remarkable for:    New health issue  shunt for hydrocephalus normal pressure.  Doing better.    right dorsal hand, shave biopsy:  -Squamous cell carcinoma, at least in situ 11/22  Areas just healed.   Overall is looking as though base is clear.  Overall doing better.  Discussed likely Efudex to base of lesion.      History of AK's anterior scalp temples, forehead preauricular cheeks.  Repeat course of fluorouracil twice weekly for 6 weeks particularly to areas treated over the temples cryo.  Discussed with patient and wife continue careful monitoring    Erythematous scaling keratotic papules noted at sites noted on map  Actinic Keratoses.  Precancerous nature discussed. Sun protection, sunscreen/ blocks encouraged Lesions treated with cryo- .  Biopsy if not resolved.    Scalp face earsx20  Hands stable    Vertex okay history of AK's in this area.    Ears significantly improved continue careful sun protection consider repeat ephelides efudex maintenance therapy    Darker lentiginous lesion at posterior neck observe.  Slightly eccentric pigmentation benign patterns on dermoscopy consistent with lentigo continue     Verrucous papule right medial brow previously treated with cryo stable.    Multiple benign keratoses of the shoulders back.  Reassurance.    Patient with history of skin cancer.  No evidence of recurrence.    No new skin cancer.    Psoriasis scalp elbows knees  hydrocortisone lotion, clobetasol.  Skin care reviewed.  Psoriasis lesions scalp and temples also areas on knees elbows hip clear continue topicals as needed stable  Psoriasis diffuse over the scalp with prominent erythema scaling keratotic papules, prominent plaques elbows knees some with more pustular areas and scaling.  Right hip minimal more diffuse lesions over the buttocks intergluteal cleft sacrum  Psoriasis.  Plaque-type.  10% body surface involvement.  Will treat with medications and grid.  Overall skincare, liberal use of emollients discussed.  Consider more aggressive therapy if worsening.Patient will let us know how they are doing over the next several weeks.  Await clinical response to above therapy.  Recheck in 2-3 months if no  improvement.  No obvious joint swelling.  No genital lesions.  No oral lesions.  Palms and soles clear.  Nails are normal.  Scalp psoriasis diffuse.  We will manage aggressively and recheck AK's this fall.    For buttocks area will use clotrimazole along with betamethasone ointment.  Will use betamethasone on the scalp.  Clobetasol ointment to elbows and knees.  Clobetasol worked well    No other susupicious lesions on todays  exam.    Please refer to map for specific lesions.  See additional diagnoses.  Pros cons of various therapies, risks benefits discussed.Pathophysiology discussed with patient.  Therapeutic options reviewed.  See  Medications in grid.  Instructions reviewed at length.    Benign nevi, seborrheic  keratoses, cherry angiomas:  Reassurance regarding other benign skin lesions.Signs and symptoms of skin cancer, ABCDE's of melanoma discussed with patient. Sunscreen use, sun protection, self exams reviewed.  Followup as noted RTC routine checkup approximately 3 months or p.r.n.     Note to patient and family: The 21st Century Cures Act makes medical notes like these available to patients. However, be advised this is a medical document. It is intended as vpid-eh-olqh communication and monitoring of a patient's care needs. It is written in medical language and may contain abbreviations or verbiage that are unfamiliar. It may appear blunt or direct. Medical documents are intended to carry relevant information, facts as evident and the clinical opinion of the practitioner.    The patient indicates understanding of these issues and agrees to the plan.  The patient is asked to return as noted in follow-up/ above.    This note was generated using Dragon voice recognition software.  Please contact me regarding any confusion resulting from errors in recognition.   Note to patient and family: The 21st Century Cures Act makes medical notes like these available to patients. However, be advised this is a medical  document. It is intended as cigc-[id number] communication and monitoring of a patient's care needs. It is written in medical language and may contain abbreviations or verbiage that are unfamiliar. It may appear blunt or direct. Medical documents are intended to carry relevant information, facts as evident and the clinical opinion of the practitioner.

## 2025-03-04 ENCOUNTER — OFFICE VISIT (OUTPATIENT)
Dept: INTERNAL MEDICINE CLINIC | Facility: CLINIC | Age: 75
End: 2025-03-04
Payer: MEDICARE

## 2025-03-04 VITALS
TEMPERATURE: 97 F | WEIGHT: 183 LBS | SYSTOLIC BLOOD PRESSURE: 154 MMHG | OXYGEN SATURATION: 95 % | BODY MASS INDEX: 26.2 KG/M2 | DIASTOLIC BLOOD PRESSURE: 98 MMHG | RESPIRATION RATE: 16 BRPM | HEIGHT: 70 IN | HEART RATE: 99 BPM

## 2025-03-04 DIAGNOSIS — H91.93 DECREASED HEARING OF BOTH EARS: ICD-10-CM

## 2025-03-04 DIAGNOSIS — R03.0 ELEVATED BP WITHOUT DIAGNOSIS OF HYPERTENSION: ICD-10-CM

## 2025-03-04 DIAGNOSIS — H93.8X2 EAR PRESSURE, LEFT: Primary | ICD-10-CM

## 2025-03-04 PROCEDURE — 99213 OFFICE O/P EST LOW 20 MIN: CPT | Performed by: NURSE PRACTITIONER

## 2025-03-04 NOTE — PROGRESS NOTES
Edis Olsen is a 74 year old male.  Chief Complaint   Patient presents with    Ear Problem     Ears feel clogged but was fitted for hearing aids 1 month ago but didn't go through with it     HPI:   He presents with his left ear feeling clogged. He states his symptoms started about a few months ago. He denies any ear pain or congestion.     He had a hearing test completed. Was told he had decreased hearing in both ears. He was fitted for hearing aids. He did not purchased the hearing aids due to cost.     Current Outpatient Medications   Medication Sig Dispense Refill    atorvastatin 10 MG Oral Tab Take 1 tablet (10 mg total) by mouth nightly. 90 tablet 1    clobetasol 0.05 % External Ointment Use bid to psoriasis (Patient not taking: Reported on 3/4/2025) 60 g 5    latanoprost 0.005 % Ophthalmic Solution Place 1 drop into both eyes nightly. (Patient not taking: Reported on 3/4/2025)  1      Past Medical History:    High cholesterol    Hyperlipidemia    life style changes    Osteoarthritis    Plantar fasciitis    steroid injection x2    Psoriasis    SCC (squamous cell carcinoma)    Sleep apnea    MILD, NO MACHINE/TREATMENT    Visual impairment    GLASSES      Past Surgical History:   Procedure Laterality Date    Colonoscopy  1999    Colonoscopy N/A 01/30/2023    Procedure: COLONOSCOPY;  Surgeon: Franki Lucero MD;  Location: [facility]    Other surgical history  2013    Lt thumb 4 sutures    Other surgical history Right 06/04/2024    RIGHT VENTRICULOPERITONEAL SHUNT INSERTION      Social History:  Social History     Socioeconomic History    Marital status:    Tobacco Use    Smoking status: Former    Smokeless tobacco: Never   Vaping Use    Vaping status: Never Used   Substance and Sexual Activity    Alcohol use: Yes     Comment: occ, wine    Drug use: No    Sexual activity: Not Currently     Partners: Female   Other Topics Concern    Caffeine Concern Yes     Comment: soda, occ    Stress Concern No     Weight Concern No    Exercise No    Seat Belt Yes    Grew up on a farm No    History of tanning Yes    Outdoor occupation No    Pt has a pacemaker No    Pt has a defibrillator No    Reaction to local anesthetic No     Social Drivers of Health     Food Insecurity: No Food Insecurity (6/5/2024)    Food Insecurity     Food Insecurity: Never true   Transportation Needs: No Transportation Needs (6/5/2024)    Transportation Needs     Lack of Transportation: No   Housing Stability: Low Risk  (6/5/2024)    Housing Stability     Housing Instability: No      Family History   Problem Relation Age of Onset    Kidney Disease Father         kidney failure (cause of death)    Other (Other) Mother         natural causes (cause of death)      Allergies[1]     REVIEW OF SYSTEMS:     Review of Systems   Constitutional:  Negative for fever.   HENT:  Negative for congestion, ear pain and rhinorrhea.         Left ear feels clogged   Respiratory:  Negative for cough, shortness of breath and wheezing.    Cardiovascular:  Negative for chest pain.   Gastrointestinal: Negative.    Genitourinary: Negative.    Musculoskeletal: Negative.    Skin: Negative.    Neurological: Negative.    Psychiatric/Behavioral: Negative.        Wt Readings from Last 5 Encounters:   03/04/25 183 lb (83 kg)   07/05/24 180 lb (81.6 kg)   06/17/24 180 lb (81.6 kg)   06/05/24 182 lb 15.7 oz (83 kg)   05/20/24 182 lb (82.6 kg)     Body mass index is 26.26 kg/m².      EXAM:   BP (!) 154/98 (BP Location: Right arm, Patient Position: Sitting, Cuff Size: large)   Pulse 99   Temp 97.2 °F (36.2 °C) (Temporal)   Resp 16   Ht 5' 10\" (1.778 m)   Wt 183 lb (83 kg)   SpO2 95%   BMI 26.26 kg/m²     Physical Exam  Vitals reviewed.   Constitutional:       Appearance: Normal appearance.   HENT:      Head: Normocephalic.      Right Ear: Tympanic membrane normal.      Left Ear: Tympanic membrane normal.      Nose: No congestion.   Cardiovascular:      Rate and Rhythm: Normal  rate and regular rhythm.      Pulses: Normal pulses.   Pulmonary:      Breath sounds: Normal breath sounds. No wheezing.   Musculoskeletal:         General: No swelling. Normal range of motion.   Skin:     General: Skin is warm and dry.   Neurological:      Mental Status: He is alert and oriented to person, place, and time.   Psychiatric:         Mood and Affect: Mood normal.         Behavior: Behavior normal.            ASSESSMENT AND PLAN:   1. Ear pressure, left  - TM visualized   - small amt of cerumen present but no impaction.    - follow up with ENT for further recommendations.   - ENT Referral - [facility])    2. Decreased hearing of both ears  - ENT Referral - [facility])    3. Elevated BP without diagnosis of hypertension  - BP elevated in office.   - patient has not been monitoring his blood pressure at home.   - dw patient to monitor his BP for 2 weeks and contact the office with his home BP readings.       The patient indicates understanding of these issues and agrees to the plan.  Return for make an appointment with Dr Rowe.         [1] No Known Allergies

## 2025-03-04 NOTE — PATIENT INSTRUCTIONS
Monitor your blood pressure at home and record your results.      Contact the office in 2 weeks with your blood pressure readings.

## 2025-03-11 ENCOUNTER — PATIENT MESSAGE (OUTPATIENT)
Dept: INTERNAL MEDICINE CLINIC | Facility: CLINIC | Age: 75
End: 2025-03-11

## 2025-03-11 ENCOUNTER — OFFICE VISIT (OUTPATIENT)
Dept: SURGERY | Facility: CLINIC | Age: 75
End: 2025-03-11
Payer: MEDICARE

## 2025-03-11 VITALS
BODY MASS INDEX: 26.2 KG/M2 | WEIGHT: 183 LBS | DIASTOLIC BLOOD PRESSURE: 90 MMHG | HEART RATE: 114 BPM | HEIGHT: 70 IN | SYSTOLIC BLOOD PRESSURE: 140 MMHG

## 2025-03-11 DIAGNOSIS — G91.2 NPH (NORMAL PRESSURE HYDROCEPHALUS) (HCC): Primary | ICD-10-CM

## 2025-03-11 PROCEDURE — 99212 OFFICE O/P EST SF 10 MIN: CPT | Performed by: NEUROLOGICAL SURGERY

## 2025-03-11 NOTE — PROGRESS NOTES
The following individual(s) verbally consented to be recorded using ambient AI listening technology and understand that they can each withdraw their consent to this listening technology at any point by asking the clinician to turn off or pause the recording:    Patient name: Edis Olsen

## 2025-03-11 NOTE — PATIENT INSTRUCTIONS
Refill policies:    Allow 2-3 business days for refills; controlled substances may take longer.  Contact your pharmacy at least 5 days prior to running out of medication and have them send an electronic request or submit request through the “request refill” option in your [employer] account.  Refills are not addressed on weekends; covering physicians do not authorize routine medications on weekends.  No narcotics or controlled substances are refilled after noon on Fridays or by on call physicians.  By law, narcotics must be electronically prescribed.  A 30 day supply with no refills is the maximum allowed.  If your prescription is due for a refill, you may be due for a follow up appointment.  To best provide you care, patients receiving routine medications need to be seen at least once a year.  Patients receiving narcotic/controlled substance medications need to be seen at least once every 3 months.  In the event that your preferred pharmacy does not have the requested medication in stock (e.g. Backordered), it is your responsibility to find another pharmacy that has the requested medication available.  We will gladly send a new prescription to that pharmacy at your request.    Scheduling Tests:    If your physician has ordered radiology tests such as MRI or CT scans, please contact Central Scheduling at [phone] right away to schedule the test.  Once scheduled, the [facility] Centralized Referral Team will work with your insurance carrier to obtain pre-certification or prior authorization.  Depending on your insurance carrier, approval may take 3-10 days.  It is highly recommended patients assure they have received an authorization before having a test performed.  If test is done without insurance authorization, patient may be responsible for the entire amount billed.      Precertification and Prior Authorizations:  If your physician has recommended that you have a procedure or additional testing performed the [facility]  Centralized Referral Team will contact your insurance carrier to obtain pre-certification or prior authorization.    You are strongly encouraged to contact your insurance carrier to verify that your procedure/test has been approved and is a COVERED benefit.  Although the [facility] Centralized Referral Team does its due diligence, the insurance carrier gives the disclaimer that \"Although the procedure is authorized, this does not guarantee payment.\"    Ultimately the patient is responsible for payment.   Thank you for your understanding in this matter.  Paperwork Completion:  If you require FMLA or disability paperwork for your recovery, please make sure to either drop it off or have it faxed to our office at [phone]. Be sure the form has your name and date of birth on it.  The form will be faxed to our Forms Department and they will complete it for you.  There is a 25$ fee for all forms that need to be filled out.  Please be aware there is a 10-14 day turnaround time.  You will need to sign a release of information (SHARON) form if your paperwork does not come with one.  You may call the Forms Department with any questions at [phone].  Their fax number is [phone].

## 2025-03-11 NOTE — PROGRESS NOTES
[facility]  Neurological Surgery Clinic Note    Edis Olsen  3/14/1950  [id number]  PCP: Patric Hernandez MD    REASON FOR VISIT:  NPH s/p VPS    HISTORY OF PRESENT ILLNESS:  Edis Olsen is a 74 year old male who presents to clinic for routine follow up after VPS on 6/4/2024 for NPH.  He reports significant improvement in his gait and balance since the procedure.  He is now able to do what he would like to do including mowing the lawn independently.  He reports continued stabbing abdominal pains that happen on both sides of the abdomen, most often exacerbated with bending or squatting.  This pain can last throughout the night causing difficulty sleeping.  He has not taken any medications for this pain.  He saw Dr. Abebe a few weeks ago for this issue.    Interval History 3/11/25  Reports a few weeks of headache and feeling his left ear is clogged. Reports no other issue and no recurrence of symptoms from NPH.    PAST MEDICAL HISTORY:  Past Medical History:    High cholesterol    Hyperlipidemia    life style changes    Osteoarthritis    Plantar fasciitis    steroid injection x2    Psoriasis    SCC (squamous cell carcinoma)    Sleep apnea    MILD, NO MACHINE/TREATMENT    Visual impairment    GLASSES       PAST SURGICAL HISTORY:  Past Surgical History:   Procedure Laterality Date    Colonoscopy  1999    Colonoscopy N/A 01/30/2023    Procedure: COLONOSCOPY;  Surgeon: Franki Lucero MD;  Location: [facility]    Other surgical history  2013    Lt thumb 4 sutures    Other surgical history Right 06/04/2024    RIGHT VENTRICULOPERITONEAL SHUNT INSERTION       FAMILY HISTORY:  family history includes Kidney Disease in his father; Other in his mother.    SOCIAL HISTORY:   reports that he has quit smoking. He has never used smokeless tobacco. He reports current alcohol use. He reports that he does not use drugs.    ALLERGIES:  No Known  Allergies    MEDICATIONS:  Current Outpatient Medications on File Prior to Visit   Medication Sig Dispense Refill    clobetasol 0.05 % External Ointment Use bid to psoriasis 60 g 5    atorvastatin 10 MG Oral Tab Take 1 tablet (10 mg total) by mouth nightly. 90 tablet 1    latanoprost 0.005 % Ophthalmic Solution Place 1 drop into both eyes nightly.  1     No current facility-administered medications on file prior to visit.       REVIEW OF SYSTEMS:  A 10-point system was reviewed.  Pertinent positives and negatives are noted in HPI.      PHYSICAL EXAMINATION:  VITAL SIGNS: /90 (BP Location: Left arm, Patient Position: Sitting, Cuff Size: large)   Pulse 114   Ht 70\"   Wt 183 lb (83 kg)   BMI 26.26 kg/m²     A&Ox3, no acute distress  PERRL, EOMi, FS, TM  Full strength x 4, no drift  Sensation intact   Incision well healed    ASSESSMENT:  74-year-old male status post  shunt for NPH    Plan:  - Head CT and shunt series ordered - will follow up thereafter    Gerardo Miller MD  Neurological Surgery  [facility] Time: 15 min including face to face time, chart review, imaging interpretation, and coordination of care

## 2025-03-12 ENCOUNTER — HOSPITAL ENCOUNTER (OUTPATIENT)
Dept: CT IMAGING | Age: 75
Discharge: HOME | End: 2025-03-12
Attending: NEUROLOGICAL SURGERY
Payer: MEDICARE

## 2025-03-12 ENCOUNTER — OFFICE VISIT (OUTPATIENT)
Dept: INTERNAL MEDICINE CLINIC | Facility: CLINIC | Age: 75
End: 2025-03-12
Payer: MEDICARE

## 2025-03-12 ENCOUNTER — HOSPITAL ENCOUNTER (OUTPATIENT)
Dept: GENERAL RADIOLOGY | Age: 75
Discharge: HOME | End: 2025-03-12
Attending: NEUROLOGICAL SURGERY
Payer: MEDICARE

## 2025-03-12 VITALS
SYSTOLIC BLOOD PRESSURE: 164 MMHG | HEIGHT: 70 IN | BODY MASS INDEX: 26.05 KG/M2 | HEART RATE: 94 BPM | RESPIRATION RATE: 18 BRPM | OXYGEN SATURATION: 98 % | WEIGHT: 182 LBS | TEMPERATURE: 98 F | DIASTOLIC BLOOD PRESSURE: 98 MMHG

## 2025-03-12 DIAGNOSIS — G91.2 NPH (NORMAL PRESSURE HYDROCEPHALUS) (HCC): ICD-10-CM

## 2025-03-12 DIAGNOSIS — Z12.5 SCREENING FOR PROSTATE CANCER: ICD-10-CM

## 2025-03-12 DIAGNOSIS — E78.5 HYPERLIPIDEMIA, UNSPECIFIED HYPERLIPIDEMIA TYPE: ICD-10-CM

## 2025-03-12 DIAGNOSIS — I10 ESSENTIAL HYPERTENSION: Primary | ICD-10-CM

## 2025-03-12 DIAGNOSIS — G91.2 NORMAL PRESSURE HYDROCEPHALUS (HCC): ICD-10-CM

## 2025-03-12 PROCEDURE — 70450 CT HEAD/BRAIN W/O DYE: CPT | Performed by: NEUROLOGICAL SURGERY

## 2025-03-12 PROCEDURE — 70360 X-RAY EXAM OF NECK: CPT | Performed by: NEUROLOGICAL SURGERY

## 2025-03-12 PROCEDURE — G2211 COMPLEX E/M VISIT ADD ON: HCPCS | Performed by: INTERNAL MEDICINE

## 2025-03-12 PROCEDURE — 70250 X-RAY EXAM OF SKULL: CPT | Performed by: NEUROLOGICAL SURGERY

## 2025-03-12 PROCEDURE — 71045 X-RAY EXAM CHEST 1 VIEW: CPT | Performed by: NEUROLOGICAL SURGERY

## 2025-03-12 PROCEDURE — 99214 OFFICE O/P EST MOD 30 MIN: CPT | Performed by: INTERNAL MEDICINE

## 2025-03-12 PROCEDURE — 74018 RADEX ABDOMEN 1 VIEW: CPT | Performed by: NEUROLOGICAL SURGERY

## 2025-03-12 RX ORDER — LISINOPRIL AND HYDROCHLOROTHIAZIDE 10; 12.5 MG/1; MG/1
1 TABLET ORAL DAILY
Qty: 90 TABLET | Refills: 3 | Status: SHIPPED | OUTPATIENT
Start: 2025-03-12 | End: 2026-03-07

## 2025-03-12 NOTE — TELEPHONE ENCOUNTER
Patient seen in the office today. Blood pressure concern addressed. No further action needed. Closing encounter.

## 2025-03-12 NOTE — PROGRESS NOTES
HPI:    Patient ID: Edis Olsen is a 74 year old male.    HPI    Patient returns to the office today to discuss chronic medical issues as listed on the active problem list below.  Patient last seen in the office by me on February 28 of last year presented with dizziness and abnormal CT scan.  He was evaluated by neurology.  They were suspicious of normal pressure hydrocephalus.  He saw the neurosurgeon.  They did a large-volume CSF tap.  His symptoms improved.  After that he had a  shunt placed as well.  He has been following with neurosurgery regularly.  Also has seen dermatology and the eye doctor.  Blood pressure at office visits have been borderline elevated.  Nurse practitioner in this office saw him on March 4 and blood pressure 154/98.  Blood pressure yesterday at the neurosurgeon office was 140/90.  No prior diagnosis of hypertension.      Today, the patient offers the following complaints: no major issues. He feels ok. In the last couple weeks, he has been having headaches on and off. Neurosurg ordered and CT and shunt study. No dizziness or ataxia. He has been getting headaches recently. Checked BP at home last high; it was high.   Wife and daughters think that he has episodes where he is not making sense when he talks.   Patient describes diet as ok. He eats too much sweets. Not much salt intake.   For exercise, the patient has not been too active in the last year. Now he is taking walks in the afternoon for about 30-45 minutes.   Tobacco and alcohol use reviewed.   Current medications reviewed. Somewhat compliant with the atorvastatin; better for the last month.   Health maintenance issues reviewed.    Wt Readings from Last 6 Encounters:   03/12/25 182 lb (82.6 kg)   03/11/25 183 lb (83 kg)   03/04/25 183 lb (83 kg)   07/05/24 180 lb (81.6 kg)   06/17/24 180 lb (81.6 kg)   06/05/24 182 lb 15.7 oz (83 kg)       Patient Active Problem List   Diagnosis    Plantar fasciitis    Psoriasis and similar  disorder    Hyperlipidemia    Screen for colon cancer    NPH (normal pressure hydrocephalus) (HCC)    Preop examination    SCC (squamous cell carcinoma)        HISTORY:  Past Medical History:    High cholesterol    Hyperlipidemia    life style changes    Osteoarthritis    Plantar fasciitis    steroid injection x2    Psoriasis    SCC (squamous cell carcinoma)    Sleep apnea    MILD, NO MACHINE/TREATMENT    Visual impairment    GLASSES      Past Surgical History:   Procedure Laterality Date    Colonoscopy  1999    Colonoscopy N/A 01/30/2023    Procedure: COLONOSCOPY;  Surgeon: Franki Lucero MD;  Location: [facility]    Other surgical history  2013    Lt thumb 4 sutures    Other surgical history Right 06/04/2024    RIGHT VENTRICULOPERITONEAL SHUNT INSERTION      Family History   Problem Relation Age of Onset    Kidney Disease Father         kidney failure (cause of death)    Other (Other) Mother         natural causes (cause of death)      Social History     Socioeconomic History    Marital status:    Tobacco Use    Smoking status: Former    Smokeless tobacco: Never   Vaping Use    Vaping status: Never Used   Substance and Sexual Activity    Alcohol use: Yes     Comment: occ, wine    Drug use: No    Sexual activity: Not Currently     Partners: Female   Other Topics Concern    Caffeine Concern Yes     Comment: soda, occ    Stress Concern No    Weight Concern No    Exercise No    Seat Belt Yes    Grew up on a farm No    History of tanning Yes    Outdoor occupation No    Pt has a pacemaker No    Pt has a defibrillator No    Reaction to local anesthetic No     Social Drivers of Health     Food Insecurity: No Food Insecurity (3/12/2025)    NCSS - Food Insecurity     Worried About Running Out of Food in the Last Year: No     Ran Out of Food in the Last Year: No   Transportation Needs: No Transportation Needs (3/12/2025)    NCSS - Transportation     Lack of Transportation: No   Housing Stability: Not At Risk  (3/12/2025)    NCSS - Housing/Utilities     Has Housing: Yes     Worried About Losing Housing: No     Unable to Get Utilities: No          Review of Systems          Current Outpatient Medications   Medication Sig Dispense Refill    clobetasol 0.05 % External Ointment Use bid to psoriasis 60 g 5    atorvastatin 10 MG Oral Tab Take 1 tablet (10 mg total) by mouth nightly. 90 tablet 1    latanoprost 0.005 % Ophthalmic Solution Place 1 drop into both eyes nightly.  1     Allergies:Allergies[1]     PHYSICAL EXAM:   /82 (BP Location: Left arm, Patient Position: Sitting, Cuff Size: large)   Pulse 94   Temp 98.2 °F (36.8 °C) (Other)   Resp 18   Ht 5' 10\" (1.778 m)   Wt 182 lb (82.6 kg)   SpO2 98%   BMI 26.11 kg/m²      Physical Exam  Constitutional:       Appearance: Normal appearance. He is well-developed.   HENT:      Nose: Nose normal.      Mouth/Throat:      Pharynx: No oropharyngeal exudate or posterior oropharyngeal erythema.   Eyes:      Conjunctiva/sclera: Conjunctivae normal.   Neck:      Vascular: No carotid bruit.   Cardiovascular:      Rate and Rhythm: Normal rate and regular rhythm.      Pulses: Normal pulses.      Heart sounds: Normal heart sounds. No murmur heard.  Pulmonary:      Effort: Pulmonary effort is normal.      Breath sounds: Normal breath sounds. No wheezing or rales.   Abdominal:      General: Bowel sounds are normal.      Palpations: Abdomen is soft. There is no mass.      Tenderness: There is no abdominal tenderness.   Musculoskeletal:      Right lower leg: No edema.      Left lower leg: No edema.   Lymphadenopathy:      Cervical: No cervical adenopathy.   Skin:     General: Skin is warm and dry.      Findings: No rash.   Neurological:      General: No focal deficit present.      Mental Status: He is alert.   Psychiatric:         Mood and Affect: Mood normal.         Behavior: Behavior normal.         Thought Content: Thought content normal.                 ASSESSMENT/PLAN:   1.  Essential hypertension new official diagnosis of hypertension.  New official diagnosis of hypertension.  This has been percolating for quite some time.  Various options discussed.  I do think it is time to start something.  Based on the numbers that I got today, we will start him on lisinopril hydrochlorothiazide 10/12.5 once a day.  Discussed possible side effects of blood pressure medication in general and this medication and specific.  Prescription sent to pharmacy.  Call with side effects or issues.  Follow-up in about a month with nurse practitioner.  If still elevated, consider changing dose to 20/12.5 or 20/25 depending on blood pressure.  We will check fasting blood work and contact patient with results.  - CBC With Differential With Platelet; Future  - Comp Metabolic Panel (14); Future  - TSH W Reflex To Free T4; Future  - Urinalysis with Culture Reflex    2. Hyperlipidemia, unspecified hyperlipidemia type  Check lipid profile.  Adjust dose of statin medication if needed.  - Lipid Panel; Future    3. Normal pressure hydrocephalus (HCC)  Patient diagnosed last year with normal pressure hydrocephalus.  Improvement after large-volume tap and improvement after placement of  shunt.  Had some headaches recently.  He is getting imaging done today with the neurologist.  Patient's family has also advised him that sometimes he is talking but not making any sense.  We will wait imaging issues to see about the status of the normal pressure hydrocephalus before deciding whether evaluation for that.  Again, he will be seeing nurse practitioner in a month and he has an appoint with me in 3 months.    4. Screening for prostate cancer  Check PSA.  - PSA Total, Screen; Future         Meds This Visit:  Requested Prescriptions      No prescriptions requested or ordered in this encounter       Imaging & Referrals:  None         Patric Hernandez MD        [1] No Known Allergies

## 2025-03-13 ENCOUNTER — LAB ENCOUNTER (OUTPATIENT)
Dept: LAB | Age: 75
End: 2025-03-13
Attending: INTERNAL MEDICINE
Payer: MEDICARE

## 2025-03-13 ENCOUNTER — OFFICE VISIT (OUTPATIENT)
Dept: DERMATOLOGY CLINIC | Facility: CLINIC | Age: 75
End: 2025-03-13
Payer: MEDICARE

## 2025-03-13 DIAGNOSIS — L40.0 PSORIASIS VULGARIS: ICD-10-CM

## 2025-03-13 DIAGNOSIS — E78.5 HYPERLIPIDEMIA, UNSPECIFIED HYPERLIPIDEMIA TYPE: ICD-10-CM

## 2025-03-13 DIAGNOSIS — L57.0 AK (ACTINIC KERATOSIS): Primary | ICD-10-CM

## 2025-03-13 DIAGNOSIS — D22.9 MULTIPLE NEVI: ICD-10-CM

## 2025-03-13 DIAGNOSIS — Z12.5 SCREENING FOR PROSTATE CANCER: ICD-10-CM

## 2025-03-13 DIAGNOSIS — D23.9 BENIGN NEOPLASM OF SKIN, UNSPECIFIED LOCATION: ICD-10-CM

## 2025-03-13 DIAGNOSIS — I10 ESSENTIAL HYPERTENSION: ICD-10-CM

## 2025-03-13 DIAGNOSIS — Z85.828 ENCOUNTER FOR FOLLOW-UP SURVEILLANCE OF SKIN CANCER: ICD-10-CM

## 2025-03-13 DIAGNOSIS — L81.4 LENTIGO: ICD-10-CM

## 2025-03-13 DIAGNOSIS — Z08 ENCOUNTER FOR FOLLOW-UP SURVEILLANCE OF SKIN CANCER: ICD-10-CM

## 2025-03-13 LAB
ALBUMIN SERPL-MCNC: 4.6 G/DL (ref 3.2–4.8)
ALBUMIN/GLOB SERPL: 1.6 {RATIO} (ref 1–2)
ALP LIVER SERPL-CCNC: 75 U/L
ALT SERPL-CCNC: 25 U/L
ANION GAP SERPL CALC-SCNC: 7 MMOL/L (ref 0–18)
AST SERPL-CCNC: 24 U/L (ref ?–34)
BASOPHILS # BLD AUTO: 0.03 X10(3) UL (ref 0–0.2)
BASOPHILS NFR BLD AUTO: 0.6 %
BILIRUB SERPL-MCNC: 0.8 MG/DL (ref 0.2–1.1)
BILIRUB UR QL: NEGATIVE
BUN BLD-MCNC: 23 MG/DL (ref 9–23)
BUN/CREAT SERPL: 20 (ref 10–20)
CALCIUM BLD-MCNC: 9.1 MG/DL (ref 8.7–10.4)
CHLORIDE SERPL-SCNC: 109 MMOL/L (ref 98–112)
CHOLEST SERPL-MCNC: 172 MG/DL (ref ?–200)
CLARITY UR: CLEAR
CO2 SERPL-SCNC: 27 MMOL/L (ref 21–32)
COLOR UR: YELLOW
COMPLEXED PSA SERPL-MCNC: 1.04 NG/ML (ref ?–4)
CREAT BLD-MCNC: 1.15 MG/DL
DEPRECATED RDW RBC AUTO: 38.3 FL (ref 35.1–46.3)
EGFRCR SERPLBLD CKD-EPI 2021: 66 ML/MIN/1.73M2 (ref 60–?)
EOSINOPHIL # BLD AUTO: 0.11 X10(3) UL (ref 0–0.7)
EOSINOPHIL NFR BLD AUTO: 2 %
ERYTHROCYTE [DISTWIDTH] IN BLOOD BY AUTOMATED COUNT: 11.9 % (ref 11–15)
FASTING PATIENT LIPID ANSWER: YES
FASTING STATUS PATIENT QL REPORTED: YES
GLOBULIN PLAS-MCNC: 2.8 G/DL (ref 2–3.5)
GLUCOSE BLD-MCNC: 92 MG/DL (ref 70–99)
GLUCOSE UR-MCNC: NEGATIVE MG/DL
HCT VFR BLD AUTO: 40.9 %
HDLC SERPL-MCNC: 53 MG/DL (ref 40–59)
HGB BLD-MCNC: 14.1 G/DL
HGB UR QL STRIP.AUTO: NEGATIVE
IMM GRANULOCYTES # BLD AUTO: 0.01 X10(3) UL (ref 0–1)
IMM GRANULOCYTES NFR BLD: 0.2 %
KETONES UR-MCNC: NEGATIVE MG/DL
LDLC SERPL CALC-MCNC: 97 MG/DL (ref ?–100)
LEUKOCYTE ESTERASE UR QL STRIP.AUTO: NEGATIVE
LYMPHOCYTES # BLD AUTO: 1.63 X10(3) UL (ref 1–4)
LYMPHOCYTES NFR BLD AUTO: 30.2 %
MCH RBC QN AUTO: 30.7 PG (ref 26–34)
MCHC RBC AUTO-ENTMCNC: 34.5 G/DL (ref 31–37)
MCV RBC AUTO: 89.1 FL
MONOCYTES # BLD AUTO: 0.53 X10(3) UL (ref 0.1–1)
MONOCYTES NFR BLD AUTO: 9.8 %
NEUTROPHILS # BLD AUTO: 3.08 X10 (3) UL (ref 1.5–7.7)
NEUTROPHILS # BLD AUTO: 3.08 X10(3) UL (ref 1.5–7.7)
NEUTROPHILS NFR BLD AUTO: 57.2 %
NITRITE UR QL STRIP.AUTO: NEGATIVE
NONHDLC SERPL-MCNC: 119 MG/DL (ref ?–130)
OSMOLALITY SERPL CALC.SUM OF ELEC: 299 MOSM/KG (ref 275–295)
PH UR: 5.5 [PH] (ref 5–8)
PLATELET # BLD AUTO: 204 10(3)UL (ref 150–450)
POTASSIUM SERPL-SCNC: 4.2 MMOL/L (ref 3.5–5.1)
PROT SERPL-MCNC: 7.4 G/DL (ref 5.7–8.2)
PROT UR-MCNC: NEGATIVE MG/DL
RBC # BLD AUTO: 4.59 X10(6)UL
SODIUM SERPL-SCNC: 143 MMOL/L (ref 136–145)
SP GR UR STRIP: 1.01 (ref 1–1.03)
TRIGL SERPL-MCNC: 127 MG/DL (ref 30–149)
TSI SER-ACNC: 2.78 UIU/ML (ref 0.55–4.78)
UROBILINOGEN UR STRIP-ACNC: 0.2
VLDLC SERPL CALC-MCNC: 21 MG/DL (ref 0–30)
WBC # BLD AUTO: 5.4 X10(3) UL (ref 4–11)

## 2025-03-13 PROCEDURE — 80053 COMPREHEN METABOLIC PANEL: CPT

## 2025-03-13 PROCEDURE — 99213 OFFICE O/P EST LOW 20 MIN: CPT | Performed by: DERMATOLOGY

## 2025-03-13 PROCEDURE — 81003 URINALYSIS AUTO W/O SCOPE: CPT | Performed by: INTERNAL MEDICINE

## 2025-03-13 PROCEDURE — 17003 DESTRUCT PREMALG LES 2-14: CPT | Performed by: DERMATOLOGY

## 2025-03-13 PROCEDURE — 17000 DESTRUCT PREMALG LESION: CPT | Performed by: DERMATOLOGY

## 2025-03-13 PROCEDURE — 85025 COMPLETE CBC W/AUTO DIFF WBC: CPT

## 2025-03-13 PROCEDURE — 36415 COLL VENOUS BLD VENIPUNCTURE: CPT

## 2025-03-13 PROCEDURE — 84443 ASSAY THYROID STIM HORMONE: CPT

## 2025-03-13 PROCEDURE — 80061 LIPID PANEL: CPT

## 2025-03-13 NOTE — PROGRESS NOTES
The following individual(s) verbally consented to be recorded using ambient AI listening technology and understand that they can each withdraw their consent to this listening technology at any point by asking the clinician to turn off or pause the recording:    Patient name: Edis FUENTES Pretty  Additional names:

## 2025-03-15 ENCOUNTER — PATIENT MESSAGE (OUTPATIENT)
Dept: SURGERY | Facility: CLINIC | Age: 75
End: 2025-03-15

## 2025-03-17 ENCOUNTER — TELEPHONE (OUTPATIENT)
Dept: INTERNAL MEDICINE CLINIC | Facility: CLINIC | Age: 75
End: 2025-03-17

## 2025-03-17 NOTE — TELEPHONE ENCOUNTER
Patient states symptoms were on Thursday. He denies any symptoms at this time. He is waiting to receive a call from neurology today (see riccardo from 3/15/25 to neurology). He has a follow up with Thu Haines next month. Advised to call back if symptoms return.

## 2025-03-17 NOTE — TELEPHONE ENCOUNTER
Left message for patient to call us back    Patient has also been in contact with Dr. Miller's office. See those notes for details    Cristian Manning Rn Triage (supporting Patric Hernandez MD)2 days ago       Thursday, March 13th I had a severe headache and according to my wife and daughter I was not very coherent. Couldn’t put a sentence together sensibly. My wife checked my blood pressure and it was 132/81  I haven’t seen any results from the brain scan yet.   On Friday I didn’t remember anything about Thursday.

## 2025-03-17 NOTE — TELEPHONE ENCOUNTER
See condition update  encounter  3/17/25.     Future Appointments   Date Time Provider Department Center   4/14/2025 10:00 AM [facility]   4/24/2025  9:10 AM Nurys Rowe MD [facility]   7/16/2025 11:20 AM Patric Hernandez MD [facility]   9/11/2025 10:45 AM Brooke Raza MD [facility] [place]

## 2025-03-17 NOTE — TELEPHONE ENCOUNTER
Noted that patient messaged with a condition update stating that:    -he had an incident on 3.13.25 where he was speaking incoherently, had a severe headache, and his memory of that day is non-existent.    -his blood pressure on 3.13.25 was 132/81.   -he would like CT results from imaging completed on 3.12.25.     Patient underwent surgery with Dr. Miller on 6.4.24:   RIGHT VENTRICULOPERITONEAL SHUNT INSERTION     Per Dr. Miller at office visit on 3.11.25:    (Patient presents)\"for routine follow up after VPS on 6/4/2024 for NPH. He reports significant improvement in his gait and balance since the procedure. He is now able to do what he would like to do including mowing the lawn independently. He reports continued stabbing abdominal pains that happen on both sides of the abdomen, most often exacerbated with bending or squatting. This pain can last throughout the night causing difficulty sleeping. He has not taken any medications for this pain. He saw Dr. Abebe a few weeks ago for this issue.     ASSESSMENT:  74-year-old male status post  shunt for NPH     Plan:  - Head CT and shunt series ordered - will follow up thereafter\"    Routed to JANEY Mendoza.

## 2025-03-17 NOTE — PROGRESS NOTES
Edis Olsen is a 75 year old male.  HPI:     CC:    Chief Complaint   Patient presents with    Full Skin Exam     Hx of Aks and SCC.  LOV 9/2024.  Pt presents for FBSE.  Lesion of concern mid abdomen, bilateral arms and hands, right lower leg, and back.  Denies bleeding or pain.          Allergies:  Patient has no known allergies.    HISTORY:    Past Medical History:    High cholesterol    Hyperlipidemia    life style changes    Osteoarthritis    Plantar fasciitis    steroid injection x2    Psoriasis    SCC (squamous cell carcinoma)    Sleep apnea    MILD, NO MACHINE/TREATMENT    Visual impairment    GLASSES      Past Surgical History:   Procedure Laterality Date    Colonoscopy  1999    Colonoscopy N/A 01/30/2023    Procedure: COLONOSCOPY;  Surgeon: Franki Lucero MD;  Location: [facility]    Other surgical history  2013    Lt thumb 4 sutures    Other surgical history Right 06/04/2024    RIGHT VENTRICULOPERITONEAL SHUNT INSERTION      Family History   Problem Relation Age of Onset    Kidney Disease Father         kidney failure (cause of death)    Other (Other) Mother         natural causes (cause of death)      Social History     Socioeconomic History    Marital status:    Tobacco Use    Smoking status: Former    Smokeless tobacco: Never   Vaping Use    Vaping status: Never Used   Substance and Sexual Activity    Alcohol use: Yes     Comment: occ, wine    Drug use: No    Sexual activity: Not Currently     Partners: Female   Other Topics Concern    Caffeine Concern Yes     Comment: soda, occ    Stress Concern No    Weight Concern No    Exercise No    Seat Belt Yes    Grew up on a farm No    History of tanning Yes    Outdoor occupation No    Pt has a pacemaker No    Pt has a defibrillator No    Reaction to local anesthetic No     Social Drivers of Health     Food Insecurity: No Food Insecurity (3/12/2025)    NCSS - Food Insecurity     Worried About Running Out of Food in the Last Year: No     Ran  Out of Food in the Last Year: No   Transportation Needs: No Transportation Needs (3/12/2025)    NCSS - Transportation     Lack of Transportation: No   Housing Stability: Not At Risk (3/12/2025)    NCSS - Housing/Utilities     Has Housing: Yes     Worried About Losing Housing: No     Unable to Get Utilities: No        Current Outpatient Medications   Medication Sig Dispense Refill    lisinopril-hydroCHLOROthiazide 10-12.5 MG Oral Tab Take 1 tablet by mouth daily. 90 tablet 3    atorvastatin 10 MG Oral Tab Take 1 tablet (10 mg total) by mouth nightly. 90 tablet 1    latanoprost 0.005 % Ophthalmic Solution Place 1 drop into both eyes nightly.  1    clobetasol 0.05 % External Ointment Use bid to psoriasis (Patient not taking: Reported on 3/13/2025) 60 g 5     Allergies:   No Known Allergies    Past Medical History:    High cholesterol    Hyperlipidemia    life style changes    Osteoarthritis    Plantar fasciitis    steroid injection x2    Psoriasis    SCC (squamous cell carcinoma)    Sleep apnea    MILD, NO MACHINE/TREATMENT    Visual impairment    GLASSES     Past Surgical History:   Procedure Laterality Date    Colonoscopy  1999    Colonoscopy N/A 01/30/2023    Procedure: COLONOSCOPY;  Surgeon: Franki Lucero MD;  Location: [facility]    Other surgical history  2013    Lt thumb 4 sutures    Other surgical history Right 06/04/2024    RIGHT VENTRICULOPERITONEAL SHUNT INSERTION     Social History     Socioeconomic History    Marital status:      Spouse name: Not on file    Number of children: Not on file    Years of education: Not on file    Highest education level: Not on file   Occupational History    Not on file   Tobacco Use    Smoking status: Former    Smokeless tobacco: Never   Vaping Use    Vaping status: Never Used   Substance and Sexual Activity    Alcohol use: Yes     Comment: occ, wine    Drug use: No    Sexual activity: Not Currently     Partners: Female   Other Topics Concern      Service Not Asked    Blood Transfusions Not Asked    Caffeine Concern Yes     Comment: soda, occ    Occupational Exposure Not Asked    Hobby Hazards Not Asked    Sleep Concern Not Asked    Stress Concern No    Weight Concern No    Special Diet Not Asked    Back Care Not Asked    Exercise No    Bike Helmet Not Asked    Seat Belt Yes    Self-Exams Not Asked    Grew up on a farm No    History of tanning Yes    Outdoor occupation No    Pt has a pacemaker No    Pt has a defibrillator No    Reaction to local anesthetic No   Social History Narrative    Not on file     Social Drivers of Health     Food Insecurity: No Food Insecurity (3/12/2025)    NCSS - Food Insecurity     Worried About Running Out of Food in the Last Year: No     Ran Out of Food in the Last Year: No   Transportation Needs: No Transportation Needs (3/12/2025)    NCSS - Transportation     Lack of Transportation: No   Stress: Not on file   Housing Stability: Not At Risk (3/12/2025)    NCSS - Housing/Utilities     Has Housing: Yes     Worried About Losing Housing: No     Unable to Get Utilities: No     Family History   Problem Relation Age of Onset    Kidney Disease Father         kidney failure (cause of death)    Other (Other) Mother         natural causes (cause of death)       There were no vitals filed for this visit.    HPI:    Chief Complaint   Patient presents with    Full Skin Exam     Hx of Aks and SCC.  LOV 9/2024.  Pt presents for FBSE.  Lesion of concern mid abdomen, bilateral arms and hands, right lower leg, and back.  Denies bleeding or pain.      History BCC AK's.  For follow-up  No particular concerns   uses sunscreen regularly over the summer   Follow-up SCC biopsy 11/22    psoriasis lesions longstanding history of psoriasis elbows knees longstanding.    Nothing else new or different.  No joint pain associated.  No other complaints.  Patient presents with concerns above.  History of Present Illness  The patient presents for a regular  dermatology follow-up.    He has several skin lesions, including benign keratoses and actinic keratoses. Some lesions, particularly those on his back, are itchy and worsen with colder weather. He describes these as 'a little rashy in the middle probably in the winter weather.'    He identifies a thicker spot as a benign keratosis, which has been present for a while. He also has a couple of scaly spots in the middle of his body, which he hopes will resolve on their own. Actinic keratoses, referred to as 'little precancers,' are present on his arm and hand. These lesions are crusty and tend to come and go. He also mentions a spot on the right side of his belly and behind his ear, which he describes as old.    He has a history of psoriasis on his knees, buttocks, elbows, and stomach. The psoriasis on his elbows has improved, stating 'that just like went away.' He also reports pustular psoriasis on his knees and buttocks.    He uses clobetasol for his psoriasis but finds it expensive and mentions that it runs out quickly. He is currently out of clobetasol and is open to using a less potent topical steroid. He uses the cream on his knees, head, arms, and buttocks, noting that it 'goes quick.'      Past notes/ records and appropriate/relevant lab results including pathology and past body maps reviewed. Updated and new information noted in current visit.     Patient has been in their usual state of health.  History, medications, allergies reviewed as noted.      ROS:  Denies any other systemic complaints.  No new or changeing lesions other than noted above. No fevers, chills, night sweats, unusual sun sensitivity.  No other skin complaints.        History, medications, allergies reviewed as noted.       Physical Examination:     Well-developed well-nourished patient alert oriented in no acute distress.  Exam total-body performed, including scalp, head, neck, face,nails, hair, external eyes, including conjunctival mucosa,  eyelids, lips external ears, back, chest,/ breasts, axillae,  abdomen, arms, legs, palms.     Multiple light to medium brown, well marginated, uniformly pigmented, macules and papules 6 mm and less scattered on exam. pigmented lesions examined with dermoscopy benign-appearing patterns.     Waxy tannish keratotic papules scattered, cherry-red vascular papules scattered.    See map today's date for lesions noted .      Otherwise remarkable for lesions as noted on map.  See details of examination  See Assessment /Plan for additional history and physical exam also:      Early AK's anterior scalp temples.  Left cheek    Psoriasiform patches right postauricular neck, knees    Assessment / plan:    No orders of the defined types were placed in this encounter.      Meds & Refills for this Visit:  Requested Prescriptions      No prescriptions requested or ordered in this encounter         Encounter Diagnoses   Name Primary?    AK (actinic keratosis) Yes    Lentigo     Benign neoplasm of skin, unspecified location     Multiple nevi     Encounter for follow-up surveillance of skin cancer     Psoriasis vulgaris        See details on map.      Remarkable for:    Physical Exam  SKIN: Rash in the middle of the back. Darker sun freckle on the back of the right neck. Darker keratosis on the left back. Spot on the front of the ear. Crusty lesion on the skin. Spot on the right forearm. Spot on the scalp. Psoriasis on the knee, elbows, and stomach. Scalp condition is good. Face condition is good with psoriasis.    Results        Assessment & Plan  Psoriasis vulgaris  Psoriasis on knees, buttocks, elbows, and stomach, with pustular areas. Symptoms improving. Discussed cost and potency of Clobetasol, opting for a less potent, cost-effective alternative.  - Prescribe Triamcinolone cream as a less potent and more cost-effective alternative to Clobetasol.    Actinic keratosis  Actinic keratoses on right belly, arm, hand, and front of ear.  Precancerous lesions require monitoring, especially the spot on the right forearm.  - Monitor the right forearm spot for changes.    [place]'s disease  Mild rash on mid-back, likely exacerbated by cold weather. Benign and not requiring significant intervention.    Benign keratosis  Multiple benign keratoses, including a thicker spot on the back and a darker keratosis on the left back. Described as age spots and not concerning.    Lentigo  Sun spots and age spots, including a darker sun freckle on the back of the right neck. Benign but require monitoring. Advised on sun protection to prevent further damage.  - Advise use of sunscreen on exposed areas, including hands, ears, and neck.    Follow-up  Routine follow-up to monitor skin conditions and response to treatment.  - Schedule follow-up appointment in 6 months.    Recording duration: 10 minutes      New health issue  shunt for hydrocephalus normal pressure.  Doing better.    right dorsal hand, shave biopsy:  -Squamous cell carcinoma, at least in situ 11/22  Areas just healed.  Overall is looking as though base is clear.  Overall doing better.  Discussed likely Efudex to base of lesion.      History of AK's anterior scalp temples, forehead preauricular cheeks.  Repeat course of fluorouracil twice weekly for 6 weeks particularly to areas treated over the temples cryo.  Discussed with patient and wife continue careful monitoring    Erythematous scaling keratotic papules noted at sites noted on map  Actinic Keratoses.  Precancerous nature discussed. Sun protection, sunscreen/ blocks encouraged Lesions treated with cryo- .  Biopsy if not resolved.    Arms hands face scalp X11      Vertex okay history of AK's in this area.    Ears significantly improved continue careful sun protection consider repeat ephelides efudex maintenance therapy    Darker lentiginous lesion at posterior neck observe.  Slightly eccentric pigmentation benign patterns on dermoscopy consistent with  lentigo continue     Verrucous papule right medial brow previously treated with cryo stable.    Multiple benign keratoses of the shoulders back.  Reassurance.    Patient with history of skin cancer.  No evidence of recurrence.    No new skin cancer.    Psoriasis scalp elbows knees  hydrocortisone lotion, clobetasol.  Skin care reviewed.  Psoriasis lesions scalp and temples also areas on knees elbows hip clear continue topicals as needed stable  Psoriasis diffuse over the scalp with prominent erythema scaling keratotic papules, prominent plaques elbows knees some with more pustular areas and scaling.  Right hip minimal more diffuse lesions over the buttocks intergluteal cleft sacrum  Psoriasis.  Plaque-type.  10% body surface involvement.  Will treat with medications and grid.  Overall skincare, liberal use of emollients discussed.  Consider more aggressive therapy if worsening.Patient will let us know how they are doing over the next several weeks.  Await clinical response to above therapy.  Recheck in 2-3 months if no improvement.  No obvious joint swelling.  No genital lesions.  No oral lesions.  Palms and soles clear.  Nails are normal.  Scalp psoriasis diffuse.  We will manage aggressively and recheck AK's this fall.    For buttocks area will use clotrimazole along with betamethasone ointment.  Will use betamethasone on the scalp.  Clobetasol ointment to elbows and knees.  Clobetasol worked well    No other susupicious lesions on todays  exam.    Please refer to map for specific lesions.  See additional diagnoses.  Pros cons of various therapies, risks benefits discussed.Pathophysiology discussed with patient.  Therapeutic options reviewed.  See  Medications in grid.  Instructions reviewed at length.    Benign nevi, seborrheic  keratoses, cherry angiomas:  Reassurance regarding other benign skin lesions.    Monitor for new or changing lesions. Signs and symptoms of skin cancer, ABCDE's of melanoma ( additional  information available at AAD.org, skincancer.org) Encourage Sunscreen (broad-spectrum, ideally mineral-based-UVA/UVB -SPF 30 or higher) use encouraged, sun protection/sun protective clothing, self exams reviewed Followup as noted RTC ---routine checkup 6 mos -one year or p.r.n.    Encounter Times   Including precharting, reviewing chart, prior notes obtaining history: 10 minutes, medical exam :10 minutes, notes on body map, plan, counseling 10minutes My total time spent caring for the patient on the day of the encounter: 30 minutes     The patient indicates understanding of these issues and agrees to the plan.  The patient is asked to return as noted in follow-up/ above.    This note was generated using Dragon voice recognition software.  Please contact me regarding any confusion resulting from errors in recognition..  Note to patient and family: The 21st Century Cures Act makes medical notes like these available to patients. However, be advised this is a medical document. It is intended as [id number] communication and monitoring of a patient's care needs. It is written in medical language and may contain abbreviations or verbiage that are unfamiliar. It may appear blunt or direct. Medical documents are intended to carry relevant information, facts as evident and the clinical opinion of the practitioner.

## 2025-03-18 NOTE — TELEPHONE ENCOUNTER
I returned the patient's call. CT head and shunt xray look ok. I reviewed with Dr Miller. Symptoms seem concerning for seizures. Will get him a Neuro appointment. Patient has had episodes of difficulty with speech and blanking out spells. Patient transferred to schedulers.

## 2025-03-20 ENCOUNTER — OFFICE VISIT (OUTPATIENT)
Dept: NEUROLOGY | Facility: CLINIC | Age: 75
End: 2025-03-20
Payer: MEDICARE

## 2025-03-20 VITALS
OXYGEN SATURATION: 92 % | SYSTOLIC BLOOD PRESSURE: 140 MMHG | HEART RATE: 94 BPM | HEIGHT: 70 IN | DIASTOLIC BLOOD PRESSURE: 80 MMHG | WEIGHT: 181 LBS | BODY MASS INDEX: 25.91 KG/M2 | RESPIRATION RATE: 16 BRPM

## 2025-03-20 DIAGNOSIS — R41.0 CONFUSION: Primary | ICD-10-CM

## 2025-03-20 DIAGNOSIS — G45.9 TRANSIENT CEREBRAL ISCHEMIA, UNSPECIFIED TYPE: ICD-10-CM

## 2025-03-20 PROCEDURE — G2211 COMPLEX E/M VISIT ADD ON: HCPCS | Performed by: OTHER

## 2025-03-20 PROCEDURE — 99215 OFFICE O/P EST HI 40 MIN: CPT | Performed by: OTHER

## 2025-03-20 NOTE — PATIENT INSTRUCTIONS
Refill policies:    Allow 2-3 business days for refills; controlled substances may take longer.  Contact your pharmacy at least 5 days prior to running out of medication and have them send an electronic request or submit request through the “request refill” option in your [employer] account.  Refills are not addressed on weekends; covering physicians do not authorize routine medications on weekends.  No narcotics or controlled substances are refilled after noon on Fridays or by on call physicians.  By law, narcotics must be electronically prescribed.  A 30 day supply with no refills is the maximum allowed.  If your prescription is due for a refill, you may be due for a follow up appointment.  To best provide you care, patients receiving routine medications need to be seen at least once a year.  Patients receiving narcotic/controlled substance medications need to be seen at least once every 3 months.  In the event that your preferred pharmacy does not have the requested medication in stock (e.g. Backordered), it is your responsibility to find another pharmacy that has the requested medication available.  We will gladly send a new prescription to that pharmacy at your request.    Scheduling Tests:    If your physician has ordered radiology tests such as MRI or CT scans, please contact Central Scheduling at [phone] right away to schedule the test.  Once scheduled, the [facility] Centralized Referral Team will work with your insurance carrier to obtain pre-certification or prior authorization.  Depending on your insurance carrier, approval may take 3-10 days.  It is highly recommended patients assure they have received an authorization before having a test performed.  If test is done without insurance authorization, patient may be responsible for the entire amount billed.      Precertification and Prior Authorizations:  If your physician has recommended that you have a procedure or additional testing performed the [facility]  Centralized Referral Team will contact your insurance carrier to obtain pre-certification or prior authorization.    You are strongly encouraged to contact your insurance carrier to verify that your procedure/test has been approved and is a COVERED benefit.  Although the [facility] Centralized Referral Team does its due diligence, the insurance carrier gives the disclaimer that \"Although the procedure is authorized, this does not guarantee payment.\"    Ultimately the patient is responsible for payment.   Thank you for your understanding in this matter.  Paperwork Completion:  If you require FMLA or disability paperwork for your recovery, please make sure to either drop it off or have it faxed to our office at [phone]. Be sure the form has your name and date of birth on it.  The form will be faxed to our Forms Department and they will complete it for you.  There is a 25$ fee for all forms that need to be filled out.  Please be aware there is a 10-14 day turnaround time.  You will need to sign a release of information (SHARON) form if your paperwork does not come with one.  You may call the Forms Department with any questions at [phone].  Their fax number is [phone].

## 2025-03-20 NOTE — PROGRESS NOTES
Neurology History & Physical     ASSESSMENT & PLAN:      ICD-10-CM    1. Confusion  R41.0 EEG      2. Transient cerebral ischemia, unspecified type  G45.9 MRA NECK (W+WO) (CPT=70549)     MRI BRAIN (W+WO) (CPT=70553)     MRA BRAIN (CPT=70544)        Episodes of confusion and aphasia, possible seizure vs TIA.  Obtain MRI brain, MRA head, MRA neck, 2 hr EEG.  This poses threat to bodily function.    Discussed warning signs - advised to go to ER if recurrent.  No driving until follow up with me.  They verbalized understanding and agreement.    I intend to be providing an ongoing, continuous, and active collaborative plan of care for the problem(s) above (the management of which require my specialized clinical knowledge, skill, and expertise).      Return in about 2 months (around 5/20/2025).       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  Saw NSGY in follow up March 2025, shunt eval unremarkable.     Wife noticed a strange event on Thursday (3/13/25).  He was having headache that day (and a few days prior as well).  He seemed less responsive on the phone, he did complain of headache and took tylenol.  Wife drove home ASAP and she noticed he wasn't answering questions appropriately.  He could make eye contact but seemed unfocused and seemed to have poor comprehension.  No weakness, no dysarthria, no facial droop.  /78 (had just started new HTN meds the day  before).  The next day the had forgotten much of the day.    May have had a similar event Martín 3/9/25.  No further headache, confusion, or impaired communication.  However, they have noticed WFD over the past few weeks.  Generally not a headache person.    Epilepsy risk factors:  Normal birth and development   No febrile or childhood seizures   No meningitis/encephalitis  No head trauma with prolonged LOC/amnesia   No known structural brain lesions  No FH of seizures    DATA REVIEWED:  As documented in the history    March 2025  LDL 92  Fasting glucose 92  CBC, CMP unremarkable   Shunt series unremarkable   Head CT   \"1. Postoperative changes from right parietal carlie hole for placement of a right ventriculoperitoneal shunt catheter.    2. Since 05/20/2024, there has been interval decrease in size of the supratentorial ventricular system, which is described in detail above. \"  NSGY note  PCP note    June 2024  Neuro notes - seen in NICU after VPS placement    March 2024  Neuro note - Kitty    PHYSICAL EXAMINATION:  /80   Pulse 94   Resp 16   Ht 70\"   Wt 181 lb (82.1 kg)   SpO2 92%   BMI 25.97 kg/m²     Gen: in NAD  MSE: AAOx3, nl language, nl attn/conc, nl fund of knowledge  CN: PERRL, VFF; EOMI, no nystagmus; nl facial mvmt bilaterally; nl hearing bilaterally; nl palate elevation bilaterally; nl voice; nl shoulder shrug b/I; nl tongue movement  Motor: 5/5 x4; no drift; normal tone; no abnormal movements  Sensory: nl vibration x4  Coord: nl FTN b/I  Reflex: 2+ BUE and BLE  Gait: normal    Allergies[1]    Current medications:   lisinopril-hydroCHLOROthiazide 10-12.5 MG Oral Tab Take 1 tablet by mouth daily. 90 tablet 3    atorvastatin 10 MG Oral Tab Take 1 tablet (10 mg total) by mouth nightly. 90 tablet 1    latanoprost 0.005 % Ophthalmic Solution Place 1 drop into both eyes nightly.  1       Past Medical History:    High cholesterol    Hyperlipidemia    life style changes    Osteoarthritis     Plantar fasciitis    steroid injection x2    Psoriasis    SCC (squamous cell carcinoma)    Sleep apnea    MILD, NO MACHINE/TREATMENT    Visual impairment    GLASSES       Past Surgical History:   Procedure Laterality Date    Colonoscopy  1999    Colonoscopy N/A 01/30/2023    Procedure: COLONOSCOPY;  Surgeon: Franki Lucero MD;  Location: [facility]    Other surgical history  2013    Lt thumb 4 sutures    Other surgical history Right 06/04/2024    RIGHT VENTRICULOPERITONEAL SHUNT INSERTION       Social History     Socioeconomic History    Marital status:    Tobacco Use    Smoking status: Former    Smokeless tobacco: Never   Vaping Use    Vaping status: Never Used   Substance and Sexual Activity    Alcohol use: Yes     Comment: occ, wine    Drug use: No    Sexual activity: Not Currently     Partners: Female   Other Topics Concern    Caffeine Concern Yes     Comment: Coffee    Stress Concern No    Weight Concern No    Exercise No    Seat Belt Yes    Grew up on a farm No    History of tanning Yes    Outdoor occupation No    Pt has a pacemaker No    Pt has a defibrillator No    Reaction to local anesthetic No       Family History   Problem Relation Age of Onset    Kidney Disease Father         kidney failure (cause of death)    Other (Other) Mother         natural causes (cause of death)       Yosvany Macdonald MD, FAES, FAAN  Board-Certified in Neurology, Epilepsy, and Clinical Neurophysiology  [facility]         [1] No Known Allergies

## 2025-03-24 RX ORDER — ATORVASTATIN CALCIUM 10 MG/1
10 TABLET, FILM COATED ORAL NIGHTLY
Qty: 90 TABLET | Refills: 3 | Status: SHIPPED | OUTPATIENT
Start: 2025-03-24

## 2025-03-27 ENCOUNTER — HOSPITAL ENCOUNTER (OUTPATIENT)
Dept: MRI IMAGING | Facility: HOSPITAL | Age: 75
Discharge: HOME | End: 2025-03-27
Attending: Other
Payer: MEDICARE

## 2025-03-27 ENCOUNTER — TELEPHONE (OUTPATIENT)
Dept: SURGERY | Facility: CLINIC | Age: 75
End: 2025-03-27

## 2025-03-27 DIAGNOSIS — G45.9 TRANSIENT CEREBRAL ISCHEMIA, UNSPECIFIED TYPE: ICD-10-CM

## 2025-03-27 PROCEDURE — A9575 INJ GADOTERATE MEGLUMI 0.1ML: HCPCS | Performed by: OTHER

## 2025-03-27 PROCEDURE — 70544 MR ANGIOGRAPHY HEAD W/O DYE: CPT | Performed by: OTHER

## 2025-03-27 PROCEDURE — 70549 MR ANGIOGRAPH NECK W/O&W/DYE: CPT | Performed by: OTHER

## 2025-03-27 PROCEDURE — 70553 MRI BRAIN STEM W/O & W/DYE: CPT | Performed by: OTHER

## 2025-03-27 RX ORDER — GADOTERATE MEGLUMINE 376.9 MG/ML
20 INJECTION INTRAVENOUS
Status: COMPLETED | OUTPATIENT
Start: 2025-03-27 | End: 2025-03-27

## 2025-03-27 RX ADMIN — GADOTERATE MEGLUMINE 17 ML: 376.9 INJECTION INTRAVENOUS at 11:15:00

## 2025-03-27 NOTE — TELEPHONE ENCOUNTER
MRIs reviewed. Case discussed with Dr. Macdonald.      I reminder patient to get his shunt re-adjusted and he'll be coming to clinic tomorrow.

## 2025-03-28 ENCOUNTER — OFFICE VISIT (OUTPATIENT)
Dept: SURGERY | Facility: CLINIC | Age: 75
End: 2025-03-28
Payer: MEDICARE

## 2025-03-28 VITALS — SYSTOLIC BLOOD PRESSURE: 116 MMHG | HEART RATE: 96 BPM | DIASTOLIC BLOOD PRESSURE: 74 MMHG

## 2025-03-28 DIAGNOSIS — Z98.2 S/P VENTRICULOPERITONEAL SHUNT: Primary | ICD-10-CM

## 2025-03-28 PROCEDURE — 99212 OFFICE O/P EST SF 10 MIN: CPT | Performed by: NURSE PRACTITIONER

## 2025-03-28 NOTE — PATIENT INSTRUCTIONS
Refill policies:    Allow 2-3 business days for refills; controlled substances may take longer.  Contact your pharmacy at least 5 days prior to running out of medication and have them send an electronic request or submit request through the “request refill” option in your [employer] account.  Refills are not addressed on weekends; covering physicians do not authorize routine medications on weekends.  No narcotics or controlled substances are refilled after noon on Fridays or by on call physicians.  By law, narcotics must be electronically prescribed.  A 30 day supply with no refills is the maximum allowed.  If your prescription is due for a refill, you may be due for a follow up appointment.  To best provide you care, patients receiving routine medications need to be seen at least once a year.  Patients receiving narcotic/controlled substance medications need to be seen at least once every 3 months.  In the event that your preferred pharmacy does not have the requested medication in stock (e.g. Backordered), it is your responsibility to find another pharmacy that has the requested medication available.  We will gladly send a new prescription to that pharmacy at your request.    Scheduling Tests:    If your physician has ordered radiology tests such as MRI or CT scans, please contact Central Scheduling at [phone] right away to schedule the test.  Once scheduled, the [facility] Centralized Referral Team will work with your insurance carrier to obtain pre-certification or prior authorization.  Depending on your insurance carrier, approval may take 3-10 days.  It is highly recommended patients assure they have received an authorization before having a test performed.  If test is done without insurance authorization, patient may be responsible for the entire amount billed.      Precertification and Prior Authorizations:  If your physician has recommended that you have a procedure or additional testing performed the [facility]  Centralized Referral Team will contact your insurance carrier to obtain pre-certification or prior authorization.    You are strongly encouraged to contact your insurance carrier to verify that your procedure/test has been approved and is a COVERED benefit.  Although the [facility] Centralized Referral Team does its due diligence, the insurance carrier gives the disclaimer that \"Although the procedure is authorized, this does not guarantee payment.\"    Ultimately the patient is responsible for payment.   Thank you for your understanding in this matter.  Paperwork Completion:  If you require FMLA or disability paperwork for your recovery, please make sure to either drop it off or have it faxed to our office at [phone]. Be sure the form has your name and date of birth on it.  The form will be faxed to our Forms Department and they will complete it for you.  There is a 25$ fee for all forms that need to be filled out.  Please be aware there is a 10-14 day turnaround time.  You will need to sign a release of information (SHARON) form if your paperwork does not come with one.  You may call the Forms Department with any questions at [phone].  Their fax number is [phone].

## 2025-03-28 NOTE — PROGRESS NOTES
[facility]  Neurological Surgery Clinic Note    Edis Olsen  3/14/1950  [id number]  PCP: Patric Hernandez MD    REASON FOR VISIT:  Shunt check    HISTORY OF PRESENT ILLNESS:  Edis Olsen is a very pleasant 75 year old male who presents to clinic for routine follow up after VPS on 6/4/2024 for NPH.  He reports significant improvement in his gait and balance since the procedure.  He is now able to do what he would like to do including mowing the lawn independently.  He reports continued stabbing abdominal pains that happen on both sides of the abdomen, most often exacerbated with bending or squatting.  This pain can last throughout the night causing difficulty sleeping.  He has not taken any medications for this pain.  He saw Dr. Abebe a few weeks ago for this issue.     Interval History 3/11/25  Reports a few weeks of headache and feeling his left ear is clogged. Reports no other issue and no recurrence of symptoms from NPH.    Interval History 3/28/25  Patient had reported some episodes of confusion and impaired communication. He was referred to Neuro. He was instructed to get MRI, MRA brain and MRA carotids. He is here for shunt check.     PAST MEDICAL HISTORY:  Past Medical History:    High cholesterol    Hyperlipidemia    life style changes    Osteoarthritis    Plantar fasciitis    steroid injection x2    Psoriasis    SCC (squamous cell carcinoma)    Sleep apnea    MILD, NO MACHINE/TREATMENT    Visual impairment    GLASSES       PAST SURGICAL HISTORY:  Past Surgical History:   Procedure Laterality Date    Colonoscopy  1999    Colonoscopy N/A 01/30/2023    Procedure: COLONOSCOPY;  Surgeon: Franki Lucero MD;  [facility]    Other surgical history  2013    Lt thumb 4 sutures    Other surgical history Right 06/04/2024    RIGHT VENTRICULOPERITONEAL SHUNT INSERTION       FAMILY HISTORY:  family history includes Kidney Disease in his father; Other in his mother.    SOCIAL  HISTORY:   reports that he has quit smoking. He has never used smokeless tobacco. He reports current alcohol use. He reports that he does not use drugs.    ALLERGIES:  Allergies[1]    MEDICATIONS:  Medications Ordered Prior to Encounter[2]    REVIEW OF SYSTEMS:  A 10-point system was reviewed.  Pertinent positives and negatives are noted in HPI.      PHYSICAL EXAMINATION:  VITAL SIGNS: There were no vitals taken for this visit.    A&Ox3, no acute distress  PERRL, EOMi, FS, TM  Full strength x 4, no drift  Sensation intact     Imaging Review:  MRI BRAIN (W+WO) (CPT=70553)    Result Date: 3/27/2025  CONCLUSION:   Mild diffuse pachymeningeal enhancement, which in the setting of a shunt likely relates to mild CSF hypotension.  Otherwise, no acute intracranial abnormality.  No mass.  No evidence of acute or subacute ischemia or infarct.  Note evaluation for infarct is mildly limited due to artifact from the shunt device.  The ventricular system is stable in appearance compared to the prior CT of 03/12/2025.  Right parietal approach shunt is not significantly changed in position from the prior CT.    Dictated by (CST): Wang Hoffman MD on 3/27/2025 at 12:16 PM     Finalized by (CST): Wang oHffman MD on 3/27/2025 at 12:24 PM          MRA NECK (W+WO) (CPT=70549)    Result Date: 3/27/2025  CONCLUSION:  Severe focal atherosclerotic narrowing at the origin of the left external carotid artery.  Bilaterally the common carotid and internal carotid arteries are without high-grade stenosis, occlusion, or dissection.  Vertebral arteries are without high-grade stenosis, occlusion, or dissection.     Dictated by (CST): Nicolas Barajas MD on 3/27/2025 at 12:15 PM     Finalized by (CST): Nicolas Barajas MD on 3/27/2025 at 12:21 PM          MRA BRAIN (FXR=50518)    Result Date: 3/27/2025  CONCLUSION: Normal MR angiogram of the head.  No flow limiting stenosis, aneurysms, or malformations.    Dictated by (CST): Wang Hoffman MD on 3/27/2025 at  12:14 PM     Finalized by (CST): Wang Hoffman MD on 3/27/2025 at 12:16 PM            Shunt Information:  [employer]tr[employer] strata II  shunt placement on 6/4/2024  Initial setting is 1.5  Current setting is 1.0  Adjusted to 1.5    ASSESSMENT:  74 yo male status post  shunt for NPH     Plan:  Shunt checked and adjusted    JANEY Haji, CNP  Neurological Surgery  formerly [facility]    Care Time: 15 min including face to face time, chart review, imaging interpretation, and coordination of care         [1] No Known Allergies  [2]   Current Outpatient Medications on File Prior to Visit   Medication Sig Dispense Refill    atorvastatin 10 MG Oral Tab Take 1 tablet (10 mg total) by mouth nightly. 90 tablet 3    lisinopril-hydroCHLOROthiazide 10-12.5 MG Oral Tab Take 1 tablet by mouth daily. 90 tablet 3    clobetasol 0.05 % External Ointment Use bid to psoriasis (Patient not taking: Reported on 3/13/2025) 60 g 5    latanoprost 0.005 % Ophthalmic Solution Place 1 drop into both eyes nightly.  1     No current facility-administered medications on file prior to visit.

## 2025-04-03 ENCOUNTER — NURSE ONLY (OUTPATIENT)
Dept: ELECTROPHYSIOLOGY | Facility: HOSPITAL | Age: 75
End: 2025-04-03
Attending: Other
Payer: MEDICARE

## 2025-04-03 DIAGNOSIS — R41.0 CONFUSION: ICD-10-CM

## 2025-04-03 PROCEDURE — 95813 EEG EXTND MNTR 61-119 MIN: CPT

## 2025-04-14 ENCOUNTER — OFFICE VISIT (OUTPATIENT)
Dept: INTERNAL MEDICINE CLINIC | Facility: CLINIC | Age: 75
End: 2025-04-14

## 2025-04-14 ENCOUNTER — LAB ENCOUNTER (OUTPATIENT)
Dept: LAB | Age: 75
End: 2025-04-14
Attending: NURSE PRACTITIONER
Payer: MEDICARE

## 2025-04-14 VITALS
TEMPERATURE: 98 F | HEIGHT: 70 IN | BODY MASS INDEX: 26.17 KG/M2 | WEIGHT: 182.81 LBS | DIASTOLIC BLOOD PRESSURE: 68 MMHG | OXYGEN SATURATION: 100 % | SYSTOLIC BLOOD PRESSURE: 123 MMHG | RESPIRATION RATE: 18 BRPM | HEART RATE: 95 BPM

## 2025-04-14 DIAGNOSIS — I10 ESSENTIAL HYPERTENSION: Primary | ICD-10-CM

## 2025-04-14 DIAGNOSIS — E78.5 HYPERLIPIDEMIA, UNSPECIFIED HYPERLIPIDEMIA TYPE: ICD-10-CM

## 2025-04-14 DIAGNOSIS — I10 ESSENTIAL HYPERTENSION: ICD-10-CM

## 2025-04-14 DIAGNOSIS — G91.2 NPH (NORMAL PRESSURE HYDROCEPHALUS) (HCC): ICD-10-CM

## 2025-04-14 LAB
ANION GAP SERPL CALC-SCNC: 8 MMOL/L (ref 0–18)
BUN BLD-MCNC: 29 MG/DL (ref 9–23)
BUN/CREAT SERPL: 20.9 (ref 10–20)
CALCIUM BLD-MCNC: 9.1 MG/DL (ref 8.7–10.4)
CHLORIDE SERPL-SCNC: 106 MMOL/L (ref 98–112)
CO2 SERPL-SCNC: 29 MMOL/L (ref 21–32)
CREAT BLD-MCNC: 1.39 MG/DL (ref 0.7–1.3)
EGFRCR SERPLBLD CKD-EPI 2021: 53 ML/MIN/1.73M2 (ref 60–?)
FASTING STATUS PATIENT QL REPORTED: NO
GLUCOSE BLD-MCNC: 123 MG/DL (ref 70–99)
OSMOLALITY SERPL CALC.SUM OF ELEC: 303 MOSM/KG (ref 275–295)
POTASSIUM SERPL-SCNC: 4.4 MMOL/L (ref 3.5–5.1)
SODIUM SERPL-SCNC: 143 MMOL/L (ref 136–145)

## 2025-04-14 PROCEDURE — 36415 COLL VENOUS BLD VENIPUNCTURE: CPT

## 2025-04-14 PROCEDURE — 80048 BASIC METABOLIC PNL TOTAL CA: CPT

## 2025-04-14 PROCEDURE — 99214 OFFICE O/P EST MOD 30 MIN: CPT | Performed by: NURSE PRACTITIONER

## 2025-04-14 NOTE — PROGRESS NOTES
Edis Olsen is a 75 year old male.  Chief Complaint   Patient presents with    Follow - Up     1 month     Blood Pressure     HPI:   He presents for follow up. He has a history of HTN. He was seen by Dr Hernandez on 3/12/2025. At that time he was started on lisinopril-hydrochlorothiazide 10/12.5mg daily. His blood pressure is normal in office today.     He denies any side effects to the medication.     He BP has been checking his BP at home and it has been ranging -129 DBP 70-80.     Current Medications[1]   Past Medical History[2]   Past Surgical History[3]   Social History:  Short Social Hx on File[4]   Family History[5]   Allergies[6]     REVIEW OF SYSTEMS:     Review of Systems   Constitutional:  Negative for fever.   HENT: Negative.     Respiratory:  Negative for cough, shortness of breath and wheezing.    Cardiovascular:  Negative for chest pain.   Gastrointestinal: Negative.    Genitourinary: Negative.    Musculoskeletal: Negative.    Skin: Negative.    Neurological:  Negative for dizziness and headaches.   Psychiatric/Behavioral: Negative.        Wt Readings from Last 5 Encounters:   04/14/25 182 lb 12.8 oz (82.9 kg)   03/20/25 181 lb (82.1 kg)   03/12/25 182 lb (82.6 kg)   03/11/25 183 lb (83 kg)   03/04/25 183 lb (83 kg)     Body mass index is 26.23 kg/m².      EXAM:   /68 (BP Location: Right arm, Patient Position: Sitting, Cuff Size: adult)   Pulse 95   Temp 97.5 °F (36.4 °C) (Temporal)   Resp 18   Ht 5' 10\" (1.778 m)   Wt 182 lb 12.8 oz (82.9 kg)   SpO2 100%   BMI 26.23 kg/m²     Physical Exam  Vitals reviewed.   Constitutional:       Appearance: Normal appearance.   HENT:      Head: Normocephalic.   Cardiovascular:      Rate and Rhythm: Normal rate and regular rhythm.      Pulses: Normal pulses.   Pulmonary:      Breath sounds: Normal breath sounds. No wheezing.   Musculoskeletal:         General: No swelling. Normal range of motion.   Skin:     General: Skin is warm and dry.    Neurological:      Mental Status: He is alert and oriented to person, place, and time.   Psychiatric:         Mood and Affect: Mood normal.         Behavior: Behavior normal.            ASSESSMENT AND PLAN:   1. Essential hypertension  - CPM  - BP stable in office   - Basic Metabolic Panel (8) [E]; Future    2. Hyperlipidemia, unspecified hyperlipidemia type  - CPM  - Lipid panel WNL 3/12/2025    3. NPH (normal pressure hydrocephalus) (HCC)  - S/p  shunt placed 6/4/2024  - following with neurosurgery LOV 3/28/2025        The patient indicates understanding of these issues and agrees to the plan.  Return for follow up with Dr Hernandez as scheduled for your annual physical.         [1]   Current Outpatient Medications   Medication Sig Dispense Refill    atorvastatin 10 MG Oral Tab Take 1 tablet (10 mg total) by mouth nightly. 90 tablet 3    lisinopril-hydroCHLOROthiazide 10-12.5 MG Oral Tab Take 1 tablet by mouth daily. 90 tablet 3    latanoprost 0.005 % Ophthalmic Solution Place 1 drop into both eyes nightly.  1   [2]   Past Medical History:   High cholesterol    Hyperlipidemia    life style changes    Osteoarthritis    Plantar fasciitis    steroid injection x2    Psoriasis    SCC (squamous cell carcinoma)    Sleep apnea    MILD, NO MACHINE/TREATMENT    Visual impairment    GLASSES   [3]   Past Surgical History:  Procedure Laterality Date    Colonoscopy  1999    Colonoscopy N/A 01/30/2023    Procedure: COLONOSCOPY;  Surgeon: Franki Lucero MD;  [facility]    Other surgical history  2013    Lt thumb 4 sutures    Other surgical history Right 06/04/2024    RIGHT VENTRICULOPERITONEAL SHUNT INSERTION   [4]   Social History  Socioeconomic History    Marital status:    Tobacco Use    Smoking status: Former    Smokeless tobacco: Never   Vaping Use    Vaping status: Never Used   Substance and Sexual Activity    Alcohol use: Yes     Comment: occ, wine    Drug use: No    Sexual activity: Not Currently      Partners: Female   Other Topics Concern    Caffeine Concern Yes     Comment: Coffee    Stress Concern No    Weight Concern No    Exercise No    Seat Belt Yes    Grew up on a farm No    History of tanning Yes    Outdoor occupation No    Pt has a pacemaker No    Pt has a defibrillator No    Reaction to local anesthetic No     Social Drivers of Health     Food Insecurity: No Food Insecurity (3/12/2025)    NCSS - Food Insecurity     Worried About Running Out of Food in the Last Year: No     Ran Out of Food in the Last Year: No   Transportation Needs: No Transportation Needs (3/12/2025)    NCSS - Transportation     Lack of Transportation: No   Housing Stability: Not At Risk (3/12/2025)    NCSS - Housing/Utilities     Has Housing: Yes     Worried About Losing Housing: No     Unable to Get Utilities: No   [5]   Family History  Problem Relation Age of Onset    Kidney Disease Father         kidney failure (cause of death)    Other (Other) Mother         natural causes (cause of death)   [6] No Known Allergies

## 2025-04-17 ENCOUNTER — PATIENT MESSAGE (OUTPATIENT)
Dept: INTERNAL MEDICINE CLINIC | Facility: CLINIC | Age: 75
End: 2025-04-17

## 2025-04-24 ENCOUNTER — OFFICE VISIT (OUTPATIENT)
Dept: OTOLARYNGOLOGY | Facility: CLINIC | Age: 75
End: 2025-04-24
Payer: MEDICARE

## 2025-04-24 ENCOUNTER — OFFICE VISIT (OUTPATIENT)
Dept: AUDIOLOGY | Facility: CLINIC | Age: 75
End: 2025-04-24

## 2025-04-24 DIAGNOSIS — H90.3 SENSORINEURAL HEARING LOSS, BILATERAL: ICD-10-CM

## 2025-04-24 DIAGNOSIS — H91.13 PRESBYCUSIS, BILATERAL: Primary | ICD-10-CM

## 2025-04-24 DIAGNOSIS — H91.93 BILATERAL HEARING LOSS, UNSPECIFIED HEARING LOSS TYPE: Primary | ICD-10-CM

## 2025-04-24 PROCEDURE — 99203 OFFICE O/P NEW LOW 30 MIN: CPT | Performed by: SPECIALIST

## 2025-04-24 PROCEDURE — 92567 TYMPANOMETRY: CPT | Performed by: AUDIOLOGIST

## 2025-04-24 PROCEDURE — 92557 COMPREHENSIVE HEARING TEST: CPT | Performed by: AUDIOLOGIST

## 2025-04-25 NOTE — PATIENT INSTRUCTIONS
We reviewed your audiogram which showed a bilateral presbycusis.  Your word discrimination score was at 100% at slightly elevated levels at 60 dB.  Please contact your secondary insurance to see if you have coverage for this benefit.  If you do, and our office is a provider, you can call [phone] and request a hearing aid evaluation.  Repeat audiogram in 1 years time, sooner if problems.

## 2025-04-25 NOTE — PROGRESS NOTES
Edis Olsen is a 75 year old male.   Chief Complaint   Patient presents with    Hearing Loss     Hearing loss of both ears     HPI:   Patient here with decreased hearing.    Current Medications[1]   Past Medical History[2]   Social History:  Short Social Hx on File[3]     REVIEW OF SYSTEMS:   GENERAL HEALTH: feels well otherwise  GENERAL : denies fever, chills, sweats, weight loss, weight gain  SKIN: denies any unusual skin lesions or rashes  RESPIRATORY: denies shortness of breath with exertion  NEURO: denies headaches    EXAM:   There were no vitals taken for this visit.  System Details   Skin Inspection - Normal.   Constitutional Overall appearance - Normal.   Head/Face Facial features - Normal. Eyebrows - Normal. Skull - Normal.   Eyes Conjunctiva - Right: Normal, Left: Normal. Pupil - Right: Normal, Left: Normal.    Ears Inspection - Right: Normal, Left: Normal.   Canal - Right: Normal, Left: Normal.   TM - Right: Normal, Left: Normal.   Nasal External nose - Normal.   Nasal septum - Normal.  Turbinates - Normal.   Oral/Oropharynx Lips - Normal, Tonsils - Normal, Tongue - Normal    Neck Exam Inspection - Normal. Palpation - Normal. Parotid gland - Normal. Thyroid gland - Normal.  No carotid bruits by auscultation   Lymph Detail Submental. Submandibular. Anterior cervical. Posterior cervical. Supraclavicular all without enlargement   Psychiatric Orientation - Oriented to time, place, person & situation. Appropriate mood and affect.   Neurological Memory - Normal. Cranial nerves - Cranial nerves II through XII grossly intact.     ASSESSMENT AND PLAN:   1. Presbycusis, bilateral  Audiogram reviewed with the patient and this shows a bilateral moderate high-frequency presbycusis.  Word discrimination score is perfect at 100% bilaterally with words presented at 60 dB.  He was given a copy at the time of his visit.  Patient to consider binaural hearing aids.  He should call his secondary insurance to check for  benefits.  If we are a covered provider, he can call our office and ask for hearing aid evaluation.  Patient to repeat the audiogram in 1 years time, sooner if problems.      The patient indicates understanding of these issues and agrees to the plan.      Nurys Rowe MD  4/24/2025  7:40 PM       [1]   Current Outpatient Medications   Medication Sig Dispense Refill    atorvastatin 10 MG Oral Tab Take 1 tablet (10 mg total) by mouth nightly. 90 tablet 3    lisinopril-hydroCHLOROthiazide 10-12.5 MG Oral Tab Take 1 tablet by mouth daily. 90 tablet 3    latanoprost 0.005 % Ophthalmic Solution Place 1 drop into both eyes nightly.  1   [2]   Past Medical History:   High cholesterol    Hyperlipidemia    life style changes    Osteoarthritis    Plantar fasciitis    steroid injection x2    Psoriasis    SCC (squamous cell carcinoma)    Sleep apnea    MILD, NO MACHINE/TREATMENT    Visual impairment    GLASSES   [3]   Social History  Socioeconomic History    Marital status:    Tobacco Use    Smoking status: Former    Smokeless tobacco: Never   Vaping Use    Vaping status: Never Used   Substance and Sexual Activity    Alcohol use: Yes     Comment: occ, wine    Drug use: No    Sexual activity: Not Currently     Partners: Female   Other Topics Concern    Caffeine Concern Yes     Comment: Coffee    Stress Concern No    Weight Concern No    Exercise No    Seat Belt Yes    Grew up on a farm No    History of tanning Yes    Outdoor occupation No    Pt has a pacemaker No    Pt has a defibrillator No    Reaction to local anesthetic No     Social Drivers of Health     Food Insecurity: No Food Insecurity (3/12/2025)    NCSS - Food Insecurity     Worried About Running Out of Food in the Last Year: No     Ran Out of Food in the Last Year: No   Transportation Needs: No Transportation Needs (3/12/2025)    NCSS - Transportation     Lack of Transportation: No   Housing Stability: Not At Risk (3/12/2025)    NCSS - Housing/Utilities      Has Housing: Yes     Worried About Losing Housing: No     Unable to Get Utilities: No

## 2025-05-21 ENCOUNTER — OFFICE VISIT (OUTPATIENT)
Facility: CLINIC | Age: 75
End: 2025-05-21
Payer: MEDICARE

## 2025-05-21 VITALS — SYSTOLIC BLOOD PRESSURE: 120 MMHG | RESPIRATION RATE: 16 BRPM | DIASTOLIC BLOOD PRESSURE: 74 MMHG | HEART RATE: 70 BPM

## 2025-05-21 DIAGNOSIS — G91.2 NPH (NORMAL PRESSURE HYDROCEPHALUS) (HCC): ICD-10-CM

## 2025-05-21 DIAGNOSIS — R41.0 CONFUSION: Primary | ICD-10-CM

## 2025-05-21 PROCEDURE — G2211 COMPLEX E/M VISIT ADD ON: HCPCS | Performed by: OTHER

## 2025-05-21 PROCEDURE — 99214 OFFICE O/P EST MOD 30 MIN: CPT | Performed by: OTHER

## 2025-05-21 NOTE — PROGRESS NOTES
Neurology History & Physical     ASSESSMENT & PLAN:      ICD-10-CM    1. Confusion  R41.0       2. NPH (normal pressure hydrocephalus) ([facility])  G91.2         Episodes of confusion and aphasia, possible seizure vs TIA.  Imaging does not explain symptoms and is not likely clinically relevant, NPH appears relatively well controlled (MRI brain with mild intracranial hypotension but asymptomatic, no positional headache, shunt was adjusted after this MRI; MRA neck with ECA stenosis which would not increase stroke risk).  EEG unremarkable.    Doing psychotherapy (just started) for depression and anxiety.      I do not have enough evidence (meaning, no clear diagnosis of seizures) to warrant anti-seizure medication or activity/driving restriction.  CTM.    I intend to be providing an ongoing, continuous, and active collaborative plan of care for the problem(s) above (the management of which require my specialized clinical knowledge, skill, and expertise).      Return in about 6 months (around 11/21/2025).       ~~~~~~~~~~~~~~~~~~~~~~~~~~~    CHIEF COMPLAINT / REASON FOR VISIT:    Chief Complaint   Patient presents with    Neurologic Problem     Follow up on episodes of confusion. No episodes since last visit.  With wife for visit today.      HISTORY OBTAINED FROM:  Patient and others as above  Chart review    HISTORY:  Edis Olsen is a 75 year old male with presumed NPH s/p VPS June 2024, had ataxia and some cognitive change, marked improvement with high volume tap, so VPS was done and had major improvement in gait/balance.  Saw NSGY in follow up March 2025, shunt eval unremarkable.     Wife noticed a strange event on Thursday (3/13/25).  He was having headache that day (and a few days prior as well).  He seemed less responsive on the phone, he did complain of headache and took tylenol.  Wife drove home ASAP and she noticed he wasn't answering questions appropriately.  He could make eye contact but seemed unfocused and  seemed to have poor comprehension.  No weakness, no dysarthria, no facial droop.  /78 (had just started new HTN meds the day before).  The next day the had forgotten much of the day.    May have had a similar event Martín 3/9/25.  No further headache, confusion, or impaired communication.  However, they have noticed WFD over the past few weeks.  Generally not a headache person.    Epilepsy risk factors:  Normal birth and development   No febrile or childhood seizures   No meningitis/encephalitis  No head trauma with prolonged LOC/amnesia   No known structural brain lesions  No FH of seizures    INTERIM HISTORY:  No further events.  They are working on recognizing and managing anxiety.    DATA REVIEWED:  As documented in the history    April 2025  EEG unremarkable  ENT note - seen for bilat hearing loss    March 2025  NSGY note  MRI brain \"Mild diffuse pachymeningeal enhancement, which in the setting of a shunt likely relates to mild CSF hypotension. \"  MRA head unremarkable  MRA neck \"Severe focal atherosclerotic narrowing at the origin of the left external carotid artery \"  LDL 92  Fasting glucose 92  CBC, CMP unremarkable   Shunt series unremarkable   Head CT   \"1. Postoperative changes from right parietal carlie hole for placement of a right ventriculoperitoneal shunt catheter.    2. Since 05/20/2024, there has been interval decrease in size of the supratentorial ventricular system, which is described in detail above. \"  NSGY note  PCP note    June 2024  Neuro notes - seen in NICU after VPS placement    March 2024  Neuro note - Kitty    PHYSICAL EXAMINATION:  /74   Pulse 70   Resp 16     Gen: in NAD  MSE: AAOx3, nl language, nl attn/conc, nl fund of knowledge  CN: PERRL, VFF; EOMI, no nystagmus; nl facial mvmt bilaterally; nl hearing bilaterally; nl palate elevation bilaterally; nl voice; nl shoulder shrug b/I; nl tongue movement  Motor: 5/5 x4; no drift; normal tone; no abnormal  movements  Sensory: nl vibration x4  Coord: nl FTN b/I  Reflex: 2+ BUE and BLE  Gait: normal    Allergies[1]    Current medications:  Current Outpatient Medications on File Prior to Visit   Medication Sig Dispense Refill    atorvastatin 10 MG Oral Tab Take 1 tablet (10 mg total) by mouth nightly. 90 tablet 3    lisinopril-hydroCHLOROthiazide 10-12.5 MG Oral Tab Take 1 tablet by mouth daily. 90 tablet 3    latanoprost 0.005 % Ophthalmic Solution Place 1 drop into both eyes nightly.  1     No current facility-administered medications on file prior to visit.          Past Medical History:    Anxiety    Arthritis    Depression    Essential hypertension    Glaucoma    High cholesterol    Hyperlipidemia    life style changes    Osteoarthritis    Plantar fasciitis    steroid injection x2    Psoriasis    SCC (squamous cell carcinoma)    Scoliosis    Sleep apnea    MILD, NO MACHINE/TREATMENT    Visual impairment    GLASSES       Past Surgical History:   Procedure Laterality Date    Colonoscopy      Colonoscopy N/A 2023    Procedure: COLONOSCOPY;  Surgeon: Franki Lucero MD;  Location: [facility]    Other surgical history      Lt thumb 4 sutures    Other surgical history Right 2024    RIGHT VENTRICULOPERITONEAL SHUNT INSERTION    Skin surgery      Vasectomy         Social History     Socioeconomic History    Marital status:    Tobacco Use    Smoking status: Former     Current packs/day: 0.00     Average packs/day: 1 pack/day for 52.0 years (52.0 ttl pk-yrs)     Types: Cigarettes     Quit date: 1970     Years since quittin.3    Smokeless tobacco: Never   Vaping Use    Vaping status: Never Used   Substance and Sexual Activity    Alcohol use: Not Currently     Comment: occ, wine    Drug use: No    Sexual activity: Not Currently     Partners: Female   Other Topics Concern    Caffeine Concern No     Comment: 1 cup of coffee per day in AM, soda rare    Stress Concern No    Weight  Concern No    Exercise Yes     Comment: walking when weather permits    Seat Belt Yes    Grew up on a farm No    History of tanning Yes    Outdoor occupation No    Pt has a pacemaker No    Pt has a defibrillator No    Reaction to local anesthetic No       Family History   Problem Relation Age of Onset    Kidney Disease Father         kidney failure (cause of death)    Other (Other) Mother         natural causes (cause of death)    Hypertension Mother        Yosvany Macdonald MD, FAES, FAAN  Board-Certified in Neurology, Epilepsy, and Clinical Neurophysiology  [facility]         [1] No Known Allergies

## 2025-05-21 NOTE — PATIENT INSTRUCTIONS
After your visit at the [place] office  today,  please direct any follow up questions or medication needs to the staff in our  [place] office so that your concerns may be promptly addressed.  We are available through [employer] or at the numbers below:    The phone number is:   [phone] option #1    The fax number is:  [phone]    Your pharmacy should also send any requests electronically to the [place] office.     Refill policies:    Allow 2-3 business days for refills; controlled substances may take longer.  Contact your pharmacy at least 5 days prior to running out of medication and have them send an electronic request or submit request through the “request refill” option in your [employer] account.  Refills are not addressed on weekends; covering physicians do not authorize routine medications on weekends.  No narcotics or controlled substances are refilled after noon on Fridays or by on call physicians.  By law, narcotics must be electronically prescribed.  A 30 day supply with no refills is the maximum allowed.  If your prescription is due for a refill, you may be due for a follow up appointment.  To best provide you care, patients receiving routine medications need to be seen at least once a year.  Patients receiving narcotic/controlled substance medications need to be seen at least once every 3 months.  In the event that your preferred pharmacy does not have the requested medication in stock (e.g. Backordered), it is your responsibility to find another pharmacy that has the requested medication available.  We will gladly send a new prescription to that pharmacy at your request.    Scheduling Tests:    If your physician has ordered radiology tests such as MRI or CT scans, please contact Central Scheduling at [phone] right away to schedule the test.  Once scheduled, the [facility] Centralized Referral Team will work with your insurance carrier to obtain pre-certification or prior  authorization.  Depending on your insurance carrier, approval may take 3-10 days.  It is highly recommended patients assure they have received an authorization before having a test performed.  If test is done without insurance authorization, patient may be responsible for the entire amount billed.      Precertification and Prior Authorizations:  If your physician has recommended that you have a procedure or additional testing performed the [facility] Centralized Referral Team will contact your insurance carrier to obtain pre-certification or prior authorization.    You are strongly encouraged to contact your insurance carrier to verify that your procedure/test has been approved and is a COVERED benefit.  Although the [facility] Centralized Referral Team does its due diligence, the insurance carrier gives the disclaimer that \"Although the procedure is authorized, this does not guarantee payment.\"    Ultimately the patient is responsible for payment.   Thank you for your understanding in this matter.  Paperwork Completion:  If you require FMLA or disability paperwork for your recovery, please make sure to either drop it off or have it faxed to our office at [phone]. Be sure the form has your name and date of birth on it.  The form will be faxed to our Forms Department and they will complete it for you.  There is a 25$ fee for all forms that need to be filled out.  Please be aware there is a 10-14 day turnaround time.  You will need to sign a release of information (SHARON) form if your paperwork does not come with one.  You may call the Forms Department with any questions at [phone].  Their fax number is [phone].

## 2025-07-16 ENCOUNTER — OFFICE VISIT (OUTPATIENT)
Dept: INTERNAL MEDICINE CLINIC | Facility: CLINIC | Age: 75
End: 2025-07-16

## 2025-07-16 VITALS
BODY MASS INDEX: 25.2 KG/M2 | RESPIRATION RATE: 20 BRPM | WEIGHT: 176 LBS | OXYGEN SATURATION: 98 % | SYSTOLIC BLOOD PRESSURE: 116 MMHG | DIASTOLIC BLOOD PRESSURE: 72 MMHG | HEART RATE: 80 BPM | TEMPERATURE: 98 F | HEIGHT: 70 IN

## 2025-07-16 DIAGNOSIS — N52.9 ERECTILE DYSFUNCTION, UNSPECIFIED ERECTILE DYSFUNCTION TYPE: ICD-10-CM

## 2025-07-16 DIAGNOSIS — R41.0 CONFUSION: ICD-10-CM

## 2025-07-16 DIAGNOSIS — F41.9 ANXIETY: ICD-10-CM

## 2025-07-16 DIAGNOSIS — E78.5 HYPERLIPIDEMIA, UNSPECIFIED HYPERLIPIDEMIA TYPE: ICD-10-CM

## 2025-07-16 DIAGNOSIS — Z23 NEED FOR VACCINATION: ICD-10-CM

## 2025-07-16 DIAGNOSIS — Z00.00 ENCOUNTER FOR ANNUAL HEALTH EXAMINATION: Primary | ICD-10-CM

## 2025-07-16 DIAGNOSIS — G91.2 NPH (NORMAL PRESSURE HYDROCEPHALUS) (HCC): ICD-10-CM

## 2025-07-16 DIAGNOSIS — I10 ESSENTIAL HYPERTENSION: ICD-10-CM

## 2025-07-16 PROCEDURE — G0009 ADMIN PNEUMOCOCCAL VACCINE: HCPCS | Performed by: INTERNAL MEDICINE

## 2025-07-16 PROCEDURE — 90677 PCV20 VACCINE IM: CPT | Performed by: INTERNAL MEDICINE

## 2025-07-16 PROCEDURE — 99213 OFFICE O/P EST LOW 20 MIN: CPT | Performed by: INTERNAL MEDICINE

## 2025-07-16 PROCEDURE — G0439 PPPS, SUBSEQ VISIT: HCPCS | Performed by: INTERNAL MEDICINE

## 2025-07-16 RX ORDER — SILDENAFIL 100 MG/1
100 TABLET, FILM COATED ORAL
Qty: 6 TABLET | Refills: 5 | Status: SHIPPED | OUTPATIENT
Start: 2025-07-16

## 2025-07-16 NOTE — PROGRESS NOTES
Subjective:   Edis Olsen is a 75 year old male who presents for a Medicare Subsequent Annual Wellness visit (Pt already had Initial Annual Wellness) and scheduled follow up of multiple significant but stable problems.   History of Present Illness    Patient is here requesting Medicare annual wellness visit and follow-up on chronic medical issues.  I last saw him March 12.  At that time we started him on lisinopril hydrochlorothiazide 10/12.5 for new diagnosis of persisting hypertension.  Since that time he has seen numerous different doctors and providers including dermatology, neurology, neurosurgery, ENT, audiology.  Saw nurse practitioner here for follow-up on blood pressure and it was normal.  Full blood work done at the time of the last visit all looked unremarkable.  History of normal pressure hydrocephalus with placement of the  shunt.  Recently had episode of confusion.  Last saw neurologist on May 21.  EEG was normal.  MRI and MRA study of brain and neck were essentially unrevealing. He had a one day episode of confusion; he does not remember it; no definite diagnosis; may have been a med mix up. Current medications reviewed.  Health maintenance and vaccination status reviewed.  Advanced directives reviewed.    Things are ok. He is seeing a therapist for anxiety and depression that comes on out of nowhere. Things are better; he is not interested in meds.  ALso with erectile dysfunction; had used Viagra in the past with success. Not checking BP at home. Does not use the CPAP at night; his sleep is ok. No tobacco or EtOH or drug use.       History/Other:   Fall Risk Assessment:   He has been screened for Falls and is low risk.      Cognitive Assessment:   He had a completely normal cognitive assessment - see flowsheet entries     Functional Ability/Status:   Edis Olsen has some abnormal functions as listed below:  He has Hearing problems based on screening of functional status.He has problems with  Memory based on screening of functional status.       Depression Screening (PHQ):  PHQ-2 SCORE: 0  , done 7/16/2025   If you checked off any problems, how difficult have these problems made it for you to do your work, take care of things at home, or get along with other people?: Not difficult at all    Last [place] Suicide Screening on 7/16/2025 was No Risk.          Advanced Directives:   He has a Living Will on file in [facility]; reviewed and discussed documents with patient (and family/surrogate if present).  He has a Power of  for Health Care on file in [facility].  Discussed Advance Care Planning with patient (and family/surrogate if present). Standard forms made available to patient in After Visit Summary.      Patient Active Problem List   Diagnosis    Plantar fasciitis    Psoriasis and similar disorder    Hyperlipidemia    Screen for colon cancer    NPH (normal pressure hydrocephalus) (HCC)    Preop examination    SCC (squamous cell carcinoma)    Confusion     Allergies:  He has no known allergies.    Current Medications:  Outpatient Medications Marked as Taking for the 7/16/25 encounter (Office Visit) with Patric Hernandez MD   Medication Sig    atorvastatin 10 MG Oral Tab Take 1 tablet (10 mg total) by mouth nightly.    lisinopril-hydroCHLOROthiazide 10-12.5 MG Oral Tab Take 1 tablet by mouth daily.    latanoprost 0.005 % Ophthalmic Solution Place 1 drop into both eyes nightly.       Medical History:  He  has a past medical history of Anxiety, Arthritis, Depression, Essential hypertension, Glaucoma, High cholesterol, Hyperlipidemia (01/01/2005), Osteoarthritis, Plantar fasciitis (01/01/2012), Psoriasis, SCC (squamous cell carcinoma) (2022), Scoliosis, Sleep apnea, and Visual impairment.  Surgical History:  He  has a past surgical history that includes colonoscopy (1999); other surgical history (2013); colonoscopy (N/A, 01/30/2023); other surgical history (Right, 06/04/2024); skin surgery; and vasectomy  ().   Family History:  His family history includes Hypertension in his mother; Kidney Disease in his father; Other in his mother.  Social History:  He  reports that he quit smoking about 55 years ago. His smoking use included cigarettes. He has a 52 pack-year smoking history. He has never used smokeless tobacco. He reports that he does not currently use alcohol. He reports that he does not use drugs.    Tobacco:  He smoked tobacco in the past but quit greater than 12 months ago.  Social History     Tobacco Use   Smoking Status Former    Current packs/day: 0.00    Average packs/day: 1 pack/day for 52.0 years (52.0 ttl pk-yrs)    Types: Cigarettes    Quit date: 1970    Years since quittin.4   Smokeless Tobacco Never        CAGE Alcohol Screen:   CAGE screening score of 0 on 2025, showing low risk of alcohol abuse.      Patient Care Team:  Patric Hernandez MD as PCP - General (Internal Medicine)  Iza Rao DPM (PODIATRIST)  Graciela Alegre APRN (Nurse Practitioner)  Thu Haines APRN as Registered Nurse (Nurse Practitioner)  Davida Rothman APRN as Nurse Practitioner (Nurse Practitioner)  Gerardo Miller MD (NEUROSURGERY)  Nurys Rowe MD (OTOLARYNGOLOGY)  Yosvany Macdonald MD as Consulting Physician (NEUROLOGY)    Review of Systems       Objective:   Physical Exam  Constitutional:       Appearance: Normal appearance. He is well-developed.   HENT:      Right Ear: Tympanic membrane and ear canal normal.      Left Ear: Tympanic membrane and ear canal normal.      Nose: Nose normal.      Mouth/Throat:      Pharynx: No oropharyngeal exudate or posterior oropharyngeal erythema.   Eyes:      Conjunctiva/sclera: Conjunctivae normal.      Pupils: Pupils are equal, round, and reactive to light.   Neck:      Thyroid: No thyromegaly.      Vascular: No carotid bruit.   Cardiovascular:      Rate and Rhythm: Normal rate and regular rhythm.      Pulses: Normal pulses.      Heart sounds: Normal  heart sounds. No murmur heard.  Pulmonary:      Effort: Pulmonary effort is normal.      Breath sounds: Normal breath sounds. No wheezing or rales.   Abdominal:      General: Bowel sounds are normal.      Palpations: Abdomen is soft. There is no mass.      Tenderness: There is no abdominal tenderness.      Hernia: There is no hernia in the left inguinal area.   Genitourinary:     Penis: Normal and circumcised.       Testes: Normal.      Comments: Subcutaneous cyst in left inguinal area- chronic, unchanged  Musculoskeletal:      Right lower leg: No edema.      Left lower leg: No edema.   Lymphadenopathy:      Cervical: No cervical adenopathy.   Skin:     General: Skin is warm and dry.      Findings: No rash.   Neurological:      General: No focal deficit present.      Mental Status: He is alert.      Cranial Nerves: No cranial nerve deficit.      Coordination: Coordination normal.   Psychiatric:         Mood and Affect: Mood normal.         Behavior: Behavior normal.         Thought Content: Thought content normal.         Judgment: Judgment normal.          /72 (BP Location: Left arm, Patient Position: Sitting, Cuff Size: large)   Pulse 80   Temp 97.8 °F (36.6 °C) (Other)   Resp 20   Ht 5' 10\" (1.778 m)   Wt 176 lb (79.8 kg)   SpO2 98%   BMI 25.25 kg/m²  Estimated body mass index is 25.25 kg/m² as calculated from the following:    Height as of this encounter: 5' 10\" (1.778 m).    Weight as of this encounter: 176 lb (79.8 kg).    Medicare Hearing Assessment:   Hearing Screening    Screening Method: Questionnaire  I have a problem hearing over the telephone: No I have trouble following the conversations when two or more people are talking at the same time: No   I have trouble understanding things on the TV: No I have to strain to understand conversations: No   I have to worry about missing the telephone ring or doorbell: No I have trouble hearing conversations in a noisy background such as a crowded room  or restaurant: No   I get confused about where sounds come from: No I misunderstand some words in a sentence and need to ask people to repeat themselves: No   I especially have trouble understanding the speech of women and children: No I have trouble understanding the speaker in a large room such as at a meeting or place of Roman [employer]: No   Many people I talk to seem to mumble (or don't speak clearly): No People get annoyed because I misunderstand what they say: No   I misunderstand what others are saying and make inappropriate responses: No I avoid social activities because I cannot hear well and fear I will reply improperly: No   Family members and friends have told me they think I may have hearing loss: No             Visual Acuity:   Right Eye Visual Acuity: Corrected Right Eye Chart Acuity: 20/30   Left Eye Visual Acuity: Corrected Left Eye Chart Acuity: 20/30   Both Eyes Visual Acuity: Corrected Both Eyes Chart Acuity: 20/30   Able To Tolerate Visual Acuity: Yes        Assessment & Plan:   Edis Olsen is a 75 year old male who presents for a Medicare Assessment.     1. Encounter for annual health examination physical exam is unremarkable.  Active issues as below.  Health maintenance issues reviewed.  Blood work done 6 months ago.  Everything looked good.  No need to repeat a right now.       2. Essential hypertension  Well-controlled.  Continue current medications including the lisinopril hydrochlorothiazide.  Follow-up in 6 months.    3. Hyperlipidemia, unspecified hyperlipidemia type  Well-controlled when last checked.  Continue atorvastatin 10 mg a day.  Work on diet and exercise.    4. Anxiety  Patient with some chronic anxiety and stress.  He is doing counseling.  Discussed the possibility of initiating medications.  Hold off for right now.  He will contact us if he is not doing well.    5. NPH (normal pressure hydrocephalus) (HCC)  History of placement of ventriculoperitoneal shunt.  Follow-up with  neurosurgery.    6. Confusion  Patient had a transient 1 day episode of confusion.  Does not remember very well.  Extensive evaluation by neurology has not shown specific etiology.  Continue current treatment.    7. Need for vaccination  Vaccination status reviewed.  Given pneumonia shot today.  - PCV20 (Prevnar 20)    8. Erectile dysfunction, unspecified erectile dysfunction type  Patient complains of erectile dysfunction.  Has been on Viagra 100 mg in the past.  Given a prescription for that now.  He wonders about testosterone level.  I advised him that we should start the Viagra first and consider testosterone level checking at a later date if things are not working.    Assessment & Plan      The patient indicates understanding of these issues and agrees to the plan.  Reinforced healthy diet, lifestyle, and exercise.      No follow-ups on file.     Patric Hernandez MD, 7/16/2025     Supplementary Documentation:   General Health:  In the past six months, have you lost more than 10 pounds without trying?: (Patient-Rptd) 1 - Yes  Has your appetite been poor?: (Patient-Rptd) No  Type of Diet: (Patient-Rptd) Balanced  How does the patient maintain a good energy level?: (Patient-Rptd) Daily Walks  How would you describe your daily physical activity?: (Patient-Rptd) Light  How would you describe your current health state?: (Patient-Rptd) Good  How do you maintain positive mental well-being?: (Patient-Rptd) Puzzles  On a scale of 0 to 10, with 0 being no pain and 10 being severe pain, what is your pain level?: (Patient-Rptd) 0 - (None)  In the past six months, have you experienced urine leakage?: (Patient-Rptd) 0-No  At any time do you feel concerned for the safety/well-being of yourself and/or your children, in your home or elsewhere?: No  Have you had any immunizations at another office such as Influenza, Hepatitis B, Tetanus, or Pneumococcal?: (Patient-Rptd) No    Health Maintenance   Topic Date Due    Annual Physical   Never done    Pneumococcal Vaccine: 50+ Years (1 of 1 - PCV) Never done    Zoster Vaccines (1 of 2) Never done    COVID-19 Vaccine (10 - 2024-25 season) 04/02/2025    Influenza Vaccine (1) 10/01/2025    Annual Depression Screening  Completed    Fall Risk Screening (Annual)  Completed    Meningococcal B Vaccine  Aged Out    Colorectal Cancer Screening  Discontinued

## (undated) DEVICE — TRAY CATH 16FR F INCL BARDX IC COMPLT CARE

## (undated) DEVICE — LAMINECTOMY CDS: Brand: MEDLINE INDUSTRIES, INC.

## (undated) DEVICE — 35 ML SYRINGE REGULAR TIP: Brand: MONOJECT

## (undated) DEVICE — DRAPE,TOWEL,LARGE,INVISISHIELD: Brand: MEDLINE

## (undated) DEVICE — STYLET 9735428 2 COIL SINGLE PACKAGE

## (undated) DEVICE — COVER LT HNDL RIG FOR SUR CAM DISP

## (undated) DEVICE — GENERAL LAPAROS CDS-LF: Brand: MEDLINE INDUSTRIES, INC.

## (undated) DEVICE — ENDOPATH ULTRA VERESS INSUFFLATION NEEDLES WITH LUER LOCK CONNECTORS: Brand: ENDOPATH

## (undated) DEVICE — SUT COAT VCRL+ 2-0 18IN CP-2 ABSRB UD ANTIBAC

## (undated) DEVICE — SOLUTION IRRIG 1000ML 0.9% NACL USP BTL

## (undated) DEVICE — TROCAR: Brand: KII SHIELDED BLADED ACCESS SYSTEM

## (undated) DEVICE — Device: Brand: INTELLICART™

## (undated) DEVICE — DISPOSABLE DUAL IRRIGATING BIPOLAR FORCEPS, NON-STICK,: Brand: SPETZLER-MALIS

## (undated) DEVICE — MEDI-VAC NON-CONDUCTIVE SUCTION TUBING 6MM X 1.8M (6FT.) L: Brand: CARDINAL HEALTH

## (undated) DEVICE — PAD,NON-ADHERENT,3X8,STERILE,LF,1/PK: Brand: MEDLINE

## (undated) DEVICE — SUT MCRYL 4-0 18IN PS-2 ABSRB UD 19MM 3/8 CIR

## (undated) DEVICE — TROCAR: Brand: KII® SLEEVE

## (undated) DEVICE — ADHESIVE SKIN TOP FOR WND CLSR DERMBND ADV

## (undated) DEVICE — TROCAR: Brand: KII FIOS FIRST ENTRY

## (undated) DEVICE — SUT PERMA- 0 18IN NABSRB BLK TIE SILK

## (undated) DEVICE — ABSORBABLE HEMOSTAT (OXIDIZED REGENERATED CELLULOSE): Brand: SURGICEL NU-KNIT

## (undated) DEVICE — SUT ETHLN 3-0 30IN PSL NABSRB BLK 30MM 3/8 CI

## (undated) DEVICE — SPONGE GZ 4X4IN COT 12 PLY TYP VII WVN C

## (undated) DEVICE — LAPAROVUE VISIBILITY SYSTEM LAPAROSCOPIC SOLUTIONS: Brand: LAPAROVUE

## (undated) DEVICE — ELECTRODE ES L10.2CM BLDE L4IN EXT MPLR OPN

## (undated) DEVICE — KIT ENDO ORCAPOD 160/180/190

## (undated) DEVICE — NEEDLE ANCHOR 1824-5D

## (undated) DEVICE — MINOP DISPOSABLE INTRODUCER 19FR: Brand: AESCULAP

## (undated) DEVICE — APPLICATOR PREP 26ML CHG 2% ISO ALC 70%

## (undated) DEVICE — SPHERE NAVI 4 MRK PASS STLTH STN

## (undated) DEVICE — SHEET, DRAPE, SPLIT, STERILE: Brand: MEDLINE

## (undated) DEVICE — SNARE ENDOSCOPIC 10MM ROUND

## (undated) DEVICE — LINE MNTR ADLT SET O2 INTMD

## (undated) DEVICE — GLOVE SUR 7.5 SENSICARE PI PIP CRM PWD F

## (undated) DEVICE — PROXIMATE SKIN STAPLERS (35 WIDE) CONTAINS 35 STAINLESS STEEL STAPLES (FIXED HEAD): Brand: PROXIMATE

## (undated) DEVICE — GLOVE SUR 7 SENSICARE PI PIP CRM PWD F

## (undated) DEVICE — MARKER SKIN PREP RESIST STRL

## (undated) DEVICE — KIT HEMSTAT MTRX 8ML PORCINE GEL HUM THROM

## (undated) DEVICE — 3M™ IOBAN™ 2 ANTIMICROBIAL INCISE DRAPE 6650EZ: Brand: IOBAN™ 2

## (undated) DEVICE — KIT CLEAN ENDOKIT 1.1OZ GOWNX2

## (undated) DEVICE — SLEEVE COMPR MD KNEE LEN SGL USE KENDALL SCD

## (undated) DEVICE — PATIENT TRACKER 9734887 NON-INVASIVE

## (undated) DEVICE — SNARE OPTMZ PLPCTM TRP

## (undated) DEVICE — FORCEP RADIAL JAW 4

## (undated) DEVICE — ZZ- DISC- NOSUB-SOLUTION RUBBING 4OZ 70% ISO ALC CLR

## (undated) DEVICE — TIDISHIELD SURGICAL PATIENT DRAPE WITH INVASIVE APERTURES BLUE STERILE UNIVERSAL NAVIGATIONAL CRANIOTOMY 8 PER CASE: Brand: TIDISHIELD

## (undated) DEVICE — SUT COAT VCRL 0 27IN CT-1 ABSRB UD 36MM 1/2

## (undated) DEVICE — SOLUTION PREP 26ML 0.7% POVACRYLEX 74% ISO

## (undated) DEVICE — HUNTER GASPER TIP, DISPOSABLE: Brand: RENEW

## (undated) DEVICE — GLOVE SUR 7.5 SENSICARE PI PIP GRN PWD F

## (undated) NOTE — LETTER
Dr. Wilton Hashimoto D.PRAJIV.   [facility] - ORTHOPEDICS  [address]  [place] [id number]  [phone]    8/13/2021    Orthotic Instructions    Nevaeh Olsen your custom-made orthotics have [facility] during the [facility]-in                 period. Pain in your hips, knees and lower back is not uncommon. Simply cut back        on your wear time each day until the pain subsides. If the pain persists, contact the         office.     9.  It is n

## (undated) NOTE — LETTER
January 24, 2017     [facility] [place] [address]      Dear Kate Ricardo:    Below are the results from your recent visit:    Resulted Orders   XR CHEST PA + LAT CHEST (CPT=71020)    Narrative    FINDINGS:   CARDIAC/VASC: Normal.  No car

## (undated) NOTE — Clinical Note
January 24, 2017     [address] [place] [address]      Dear [place] [facility]:    Below are the results from your recent visit:    Resulted Orders   EKG 12-LEAD    Narrative    ECG Report      Interpretation      --------------------------  S

## (undated) NOTE — LETTER
11/4/2022              [address] [facility]        [address] [facility]         Dear Gayle Zamora,    This letter is to inform you that our office has made several attempts to reach you by phone without success. We were attempting to contact you by phone regarding scheduling your Colonoscopy. Please contact our office at the number listed below as soon as you receive this letter to discuss this issue and to make the necessary changes in our system to your contact information. Thank you for your cooperation. Sincerely,    Harlan Templeton.  Jd John, [address] [place] [address]  [phone]

## (undated) NOTE — MR AVS SNAPSHOT
[address] [address]  [phone]               Thank you for choosing us for your health care visit with Tobias Delgado MD.  We are glad to serve you and happy to provide you with this summar prediabetic patients        Cardiovascular Disease Screening     Cholesterol, covered every 5 yrs including Total, LDL and Trigs CALCULATED LDL (mg/dL)   Date Value   09/11/2014 166*     CHOLESTEROL (P) (mg/dL)   Date Value   09/11/2014 256*     TRIGLYCERI Covered Annually No orders found for this or any previous visit. Please get every year    Pneumococcal 13 (Prevnar)  Covered Once after 65 No orders found for this or any previous visit.  Please get once after your 65th birthday    Pneumococcal 23 (Pneumova Allergies as of Jan 23, 2017     No Known Allergies                Today's Vital Signs     BP Pulse Temp Height Weight BMI    150/90 mmHg 98 98 °F (36.7 °C) (Oral) 5' 10\" (1.778 m) 181 lb 3.2 oz (82.192 kg) 26.00 kg/m2         Current Medications Facility, call Central Scheduling at [phone], Monday through Friday between 7:30am to 6pm and on Saturday between 8am and 1pm. Evening and weekend appointments for your exam are available. Walk-in patients are welcome for most exams.      Foley H Please consider the following Lifestyle Modifications. Also, please return for a follow-up Blood Pressure Check in 1 month.      Lifestyle Modification Recommendations:    Modification Recommendation   Weight Reduction Maintain normal body weight (body mas